# Patient Record
Sex: FEMALE | Race: ASIAN | NOT HISPANIC OR LATINO
[De-identification: names, ages, dates, MRNs, and addresses within clinical notes are randomized per-mention and may not be internally consistent; named-entity substitution may affect disease eponyms.]

---

## 2017-04-04 PROBLEM — Z00.00 ENCOUNTER FOR PREVENTIVE HEALTH EXAMINATION: Status: ACTIVE | Noted: 2017-04-04

## 2017-04-05 ENCOUNTER — APPOINTMENT (OUTPATIENT)
Dept: SURGICAL ONCOLOGY | Facility: CLINIC | Age: 62
End: 2017-04-05

## 2017-04-05 VITALS
OXYGEN SATURATION: 97 % | BODY MASS INDEX: 19.32 KG/M2 | WEIGHT: 105 LBS | HEIGHT: 62 IN | RESPIRATION RATE: 14 BRPM | HEART RATE: 74 BPM | SYSTOLIC BLOOD PRESSURE: 127 MMHG | DIASTOLIC BLOOD PRESSURE: 75 MMHG

## 2017-04-05 DIAGNOSIS — Z84.89 FAMILY HISTORY OF OTHER SPECIFIED CONDITIONS: ICD-10-CM

## 2017-04-05 DIAGNOSIS — Z78.9 OTHER SPECIFIED HEALTH STATUS: ICD-10-CM

## 2017-04-05 DIAGNOSIS — I10 ESSENTIAL (PRIMARY) HYPERTENSION: ICD-10-CM

## 2017-04-05 DIAGNOSIS — Z83.3 FAMILY HISTORY OF DIABETES MELLITUS: ICD-10-CM

## 2017-04-10 ENCOUNTER — RESULT REVIEW (OUTPATIENT)
Age: 62
End: 2017-04-10

## 2017-04-11 ENCOUNTER — RESULT REVIEW (OUTPATIENT)
Age: 62
End: 2017-04-11

## 2017-04-11 ENCOUNTER — OUTPATIENT (OUTPATIENT)
Dept: OUTPATIENT SERVICES | Facility: HOSPITAL | Age: 62
LOS: 1 days | Discharge: HOME | End: 2017-04-11

## 2017-04-11 ENCOUNTER — APPOINTMENT (OUTPATIENT)
Dept: HEMATOLOGY ONCOLOGY | Facility: CLINIC | Age: 62
End: 2017-04-11

## 2017-04-11 ENCOUNTER — APPOINTMENT (OUTPATIENT)
Dept: RADIATION ONCOLOGY | Facility: CLINIC | Age: 62
End: 2017-04-11

## 2017-04-11 ENCOUNTER — OUTPATIENT (OUTPATIENT)
Dept: OUTPATIENT SERVICES | Facility: HOSPITAL | Age: 62
LOS: 1 days | Discharge: HOME | End: 2017-04-11
Payer: MEDICAID

## 2017-04-11 VITALS
BODY MASS INDEX: 19.31 KG/M2 | WEIGHT: 103.62 LBS | OXYGEN SATURATION: 96 % | TEMPERATURE: 98 F | HEIGHT: 61.42 IN | HEART RATE: 71 BPM | SYSTOLIC BLOOD PRESSURE: 129 MMHG | DIASTOLIC BLOOD PRESSURE: 76 MMHG | RESPIRATION RATE: 16 BRPM

## 2017-04-11 VITALS
BODY MASS INDEX: 18.78 KG/M2 | DIASTOLIC BLOOD PRESSURE: 70 MMHG | TEMPERATURE: 96.9 F | WEIGHT: 102.06 LBS | OXYGEN SATURATION: 97 % | HEART RATE: 70 BPM | RESPIRATION RATE: 16 BRPM | HEIGHT: 62 IN | SYSTOLIC BLOOD PRESSURE: 109 MMHG

## 2017-04-11 DIAGNOSIS — Z78.9 OTHER SPECIFIED HEALTH STATUS: ICD-10-CM

## 2017-04-11 DIAGNOSIS — C20 MALIGNANT NEOPLASM OF RECTUM: ICD-10-CM

## 2017-04-11 LAB
BASOPHILS # BLD AUTO: 0.1 K/UL — SIGNIFICANT CHANGE UP (ref 0–0.2)
BASOPHILS NFR BLD AUTO: 0.9 % — SIGNIFICANT CHANGE UP (ref 0–2)
EOSINOPHIL # BLD AUTO: 0.1 K/UL — SIGNIFICANT CHANGE UP (ref 0–0.5)
EOSINOPHIL NFR BLD AUTO: 0.9 % — SIGNIFICANT CHANGE UP (ref 0–6)
HCT VFR BLD CALC: 38.3 % — SIGNIFICANT CHANGE UP (ref 34.5–45)
HGB BLD-MCNC: 13.2 G/DL — SIGNIFICANT CHANGE UP (ref 11.5–15.5)
LYMPHOCYTES # BLD AUTO: 1.8 K/UL — SIGNIFICANT CHANGE UP (ref 1–3.3)
LYMPHOCYTES # BLD AUTO: 31.2 % — SIGNIFICANT CHANGE UP (ref 13–44)
MCHC RBC-ENTMCNC: 32 PG — SIGNIFICANT CHANGE UP (ref 27–34)
MCHC RBC-ENTMCNC: 34.3 G/DL — SIGNIFICANT CHANGE UP (ref 32–36)
MCV RBC AUTO: 93.2 FL — SIGNIFICANT CHANGE UP (ref 80–100)
MONOCYTES # BLD AUTO: 0.4 K/UL — SIGNIFICANT CHANGE UP (ref 0–0.9)
MONOCYTES NFR BLD AUTO: 6.2 % — SIGNIFICANT CHANGE UP (ref 2–14)
NEUTROPHILS # BLD AUTO: 3.6 K/UL — SIGNIFICANT CHANGE UP (ref 1.8–7.4)
NEUTROPHILS NFR BLD AUTO: 60.8 % — SIGNIFICANT CHANGE UP (ref 43–77)
PLATELET # BLD AUTO: 210 K/UL — SIGNIFICANT CHANGE UP (ref 150–400)
RBC # BLD: 4.11 M/UL — SIGNIFICANT CHANGE UP (ref 3.8–5.2)
RBC # FLD: 11.7 % — SIGNIFICANT CHANGE UP (ref 10.3–14.5)
WBC # BLD: 5.9 K/UL — SIGNIFICANT CHANGE UP (ref 3.8–10.5)
WBC # FLD AUTO: 5.9 K/UL — SIGNIFICANT CHANGE UP (ref 3.8–10.5)

## 2017-04-11 PROCEDURE — 93010 ELECTROCARDIOGRAM REPORT: CPT

## 2017-04-12 ENCOUNTER — OUTPATIENT (OUTPATIENT)
Dept: OUTPATIENT SERVICES | Facility: HOSPITAL | Age: 62
LOS: 1 days | End: 2017-04-12

## 2017-04-12 ENCOUNTER — OUTPATIENT (OUTPATIENT)
Dept: OUTPATIENT SERVICES | Facility: HOSPITAL | Age: 62
LOS: 1 days | End: 2017-04-12
Payer: SELF-PAY

## 2017-04-12 DIAGNOSIS — K63.5 POLYP OF COLON: ICD-10-CM

## 2017-04-12 DIAGNOSIS — Z00.8 ENCOUNTER FOR OTHER GENERAL EXAMINATION: ICD-10-CM

## 2017-04-12 LAB
SURGICAL PATHOLOGY STUDY: SIGNIFICANT CHANGE UP
SURGICAL PATHOLOGY STUDY: SIGNIFICANT CHANGE UP

## 2017-04-12 PROCEDURE — 88321 CONSLTJ&REPRT SLD PREP ELSWR: CPT

## 2017-04-13 LAB
25(OH)D3 SERPL-MCNC: 23.1 NG/ML
ALBUMIN SERPL ELPH-MCNC: 4.7 G/DL
ALP BLD-CCNC: 39 U/L
ALT SERPL-CCNC: 16 U/L
ANION GAP SERPL CALC-SCNC: 22 MMOL/L
AST SERPL-CCNC: 24 U/L
BILIRUB SERPL-MCNC: 0.7 MG/DL
BUN SERPL-MCNC: 17 MG/DL
CALCIUM SERPL-MCNC: 9.6 MG/DL
CEA SERPL-MCNC: 3.8 NG/ML
CHLORIDE SERPL-SCNC: 94 MMOL/L
CO2 SERPL-SCNC: 26 MMOL/L
CREAT SERPL-MCNC: 0.69 MG/DL
GLUCOSE SERPL-MCNC: 99 MG/DL
HAV IGM SER QL: NONREACTIVE
HBV CORE IGM SER QL: NONREACTIVE
HBV SURFACE AG SER QL: NONREACTIVE
HCV AB SER QL: NONREACTIVE
HCV S/CO RATIO: 0.14 S/CO
POTASSIUM SERPL-SCNC: 3.4 MMOL/L
PROT SERPL-MCNC: 7.6 G/DL
SODIUM SERPL-SCNC: 142 MMOL/L

## 2017-04-20 ENCOUNTER — APPOINTMENT (OUTPATIENT)
Dept: INFUSION THERAPY | Facility: HOSPITAL | Age: 62
End: 2017-04-20

## 2017-04-23 ENCOUNTER — FORM ENCOUNTER (OUTPATIENT)
Age: 62
End: 2017-04-23

## 2017-04-24 ENCOUNTER — APPOINTMENT (OUTPATIENT)
Dept: NUCLEAR MEDICINE | Facility: IMAGING CENTER | Age: 62
End: 2017-04-24

## 2017-04-24 ENCOUNTER — OUTPATIENT (OUTPATIENT)
Dept: OUTPATIENT SERVICES | Facility: HOSPITAL | Age: 62
LOS: 1 days | End: 2017-04-24
Payer: MEDICAID

## 2017-04-24 DIAGNOSIS — C20 MALIGNANT NEOPLASM OF RECTUM: ICD-10-CM

## 2017-04-24 PROCEDURE — A9552: CPT

## 2017-04-24 PROCEDURE — 78815 PET IMAGE W/CT SKULL-THIGH: CPT

## 2017-04-27 ENCOUNTER — OTHER (OUTPATIENT)
Age: 62
End: 2017-04-27

## 2017-05-17 VITALS
WEIGHT: 103.16 LBS | RESPIRATION RATE: 16 BRPM | BODY MASS INDEX: 18.98 KG/M2 | TEMPERATURE: 97.5 F | DIASTOLIC BLOOD PRESSURE: 76 MMHG | SYSTOLIC BLOOD PRESSURE: 120 MMHG | OXYGEN SATURATION: 98 % | HEIGHT: 62 IN | HEART RATE: 67 BPM

## 2017-05-18 ENCOUNTER — OUTPATIENT (OUTPATIENT)
Dept: OUTPATIENT SERVICES | Facility: HOSPITAL | Age: 62
LOS: 1 days | Discharge: HOME | End: 2017-05-18

## 2017-05-18 ENCOUNTER — OTHER (OUTPATIENT)
Age: 62
End: 2017-05-18

## 2017-05-18 DIAGNOSIS — C20 MALIGNANT NEOPLASM OF RECTUM: ICD-10-CM

## 2017-05-19 ENCOUNTER — RESULT REVIEW (OUTPATIENT)
Age: 62
End: 2017-05-19

## 2017-05-19 ENCOUNTER — APPOINTMENT (OUTPATIENT)
Dept: HEMATOLOGY ONCOLOGY | Facility: CLINIC | Age: 62
End: 2017-05-19

## 2017-05-19 VITALS
HEART RATE: 72 BPM | BODY MASS INDEX: 19.03 KG/M2 | RESPIRATION RATE: 16 BRPM | TEMPERATURE: 98.3 F | DIASTOLIC BLOOD PRESSURE: 71 MMHG | WEIGHT: 104.06 LBS | SYSTOLIC BLOOD PRESSURE: 117 MMHG | OXYGEN SATURATION: 98 %

## 2017-05-19 LAB
BASOPHILS # BLD AUTO: 0 K/UL — SIGNIFICANT CHANGE UP (ref 0–0.2)
BASOPHILS NFR BLD AUTO: 0 % — SIGNIFICANT CHANGE UP (ref 0–2)
EOSINOPHIL # BLD AUTO: 0 K/UL — SIGNIFICANT CHANGE UP (ref 0–0.5)
EOSINOPHIL NFR BLD AUTO: 0.9 % — SIGNIFICANT CHANGE UP (ref 0–6)
HCT VFR BLD CALC: 36.8 % — SIGNIFICANT CHANGE UP (ref 34.5–45)
HGB BLD-MCNC: 12.6 G/DL — SIGNIFICANT CHANGE UP (ref 11.5–15.5)
LYMPHOCYTES # BLD AUTO: 0.8 K/UL — LOW (ref 1–3.3)
LYMPHOCYTES # BLD AUTO: 21 % — SIGNIFICANT CHANGE UP (ref 13–44)
MCHC RBC-ENTMCNC: 32.6 PG — SIGNIFICANT CHANGE UP (ref 27–34)
MCHC RBC-ENTMCNC: 34.2 G/DL — SIGNIFICANT CHANGE UP (ref 32–36)
MCV RBC AUTO: 95.5 FL — SIGNIFICANT CHANGE UP (ref 80–100)
MONOCYTES # BLD AUTO: 0.2 K/UL — SIGNIFICANT CHANGE UP (ref 0–0.9)
MONOCYTES NFR BLD AUTO: 6 % — SIGNIFICANT CHANGE UP (ref 2–14)
NEUTROPHILS # BLD AUTO: 2.8 K/UL — SIGNIFICANT CHANGE UP (ref 1.8–7.4)
NEUTROPHILS NFR BLD AUTO: 72.1 % — SIGNIFICANT CHANGE UP (ref 43–77)
PLATELET # BLD AUTO: 212 K/UL — SIGNIFICANT CHANGE UP (ref 150–400)
RBC # BLD: 3.85 M/UL — SIGNIFICANT CHANGE UP (ref 3.8–5.2)
RBC # FLD: 11.9 % — SIGNIFICANT CHANGE UP (ref 10.3–14.5)
WBC # BLD: 3.9 K/UL — SIGNIFICANT CHANGE UP (ref 3.8–10.5)
WBC # FLD AUTO: 3.9 K/UL — SIGNIFICANT CHANGE UP (ref 3.8–10.5)

## 2017-05-19 RX ORDER — CHLORTHALIDONE 25 MG/1
25 TABLET ORAL DAILY
Refills: 0 | Status: DISCONTINUED | COMMUNITY
Start: 2017-04-25 | End: 2017-05-19

## 2017-05-22 VITALS
DIASTOLIC BLOOD PRESSURE: 66 MMHG | RESPIRATION RATE: 16 BRPM | SYSTOLIC BLOOD PRESSURE: 110 MMHG | HEART RATE: 70 BPM | TEMPERATURE: 97.2 F

## 2017-05-22 LAB
ALBUMIN SERPL ELPH-MCNC: 4.2 G/DL
ALP BLD-CCNC: 33 U/L
ALT SERPL-CCNC: 30 U/L
ANION GAP SERPL CALC-SCNC: 12 MMOL/L
AST SERPL-CCNC: 26 U/L
BILIRUB SERPL-MCNC: 0.5 MG/DL
BUN SERPL-MCNC: 18 MG/DL
CALCIUM SERPL-MCNC: 9.5 MG/DL
CEA SERPL-MCNC: 3.5 NG/ML
CHLORIDE SERPL-SCNC: 99 MMOL/L
CO2 SERPL-SCNC: 27 MMOL/L
CREAT SERPL-MCNC: 0.74 MG/DL
GLUCOSE SERPL-MCNC: 95 MG/DL
POTASSIUM SERPL-SCNC: 4.7 MMOL/L
PROT SERPL-MCNC: 6.8 G/DL
SODIUM SERPL-SCNC: 138 MMOL/L

## 2017-05-25 ENCOUNTER — OTHER (OUTPATIENT)
Age: 62
End: 2017-05-25

## 2017-05-30 VITALS
DIASTOLIC BLOOD PRESSURE: 74 MMHG | SYSTOLIC BLOOD PRESSURE: 123 MMHG | HEART RATE: 65 BPM | RESPIRATION RATE: 18 BRPM | OXYGEN SATURATION: 99 % | WEIGHT: 105.27 LBS | BODY MASS INDEX: 19.25 KG/M2

## 2017-06-05 VITALS
SYSTOLIC BLOOD PRESSURE: 129 MMHG | WEIGHT: 104.28 LBS | HEART RATE: 62 BPM | BODY MASS INDEX: 19.07 KG/M2 | DIASTOLIC BLOOD PRESSURE: 76 MMHG | OXYGEN SATURATION: 100 % | RESPIRATION RATE: 16 BRPM

## 2017-06-06 ENCOUNTER — OTHER (OUTPATIENT)
Age: 62
End: 2017-06-06

## 2017-06-07 ENCOUNTER — RESULT REVIEW (OUTPATIENT)
Age: 62
End: 2017-06-07

## 2017-06-07 ENCOUNTER — APPOINTMENT (OUTPATIENT)
Dept: HEMATOLOGY ONCOLOGY | Facility: CLINIC | Age: 62
End: 2017-06-07

## 2017-06-07 ENCOUNTER — OTHER (OUTPATIENT)
Age: 62
End: 2017-06-07

## 2017-06-07 VITALS
SYSTOLIC BLOOD PRESSURE: 120 MMHG | BODY MASS INDEX: 18.95 KG/M2 | TEMPERATURE: 98 F | HEART RATE: 66 BPM | RESPIRATION RATE: 16 BRPM | DIASTOLIC BLOOD PRESSURE: 74 MMHG | WEIGHT: 103.62 LBS

## 2017-06-07 LAB
ALBUMIN SERPL ELPH-MCNC: 4.2 G/DL
ALP BLD-CCNC: 32 U/L
ALT SERPL-CCNC: 59 U/L
ANION GAP SERPL CALC-SCNC: 19 MMOL/L
AST SERPL-CCNC: 37 U/L
BASOPHILS # BLD AUTO: 0 K/UL — SIGNIFICANT CHANGE UP (ref 0–0.2)
BILIRUB SERPL-MCNC: 0.4 MG/DL
BUN SERPL-MCNC: 19 MG/DL
CALCIUM SERPL-MCNC: 9.6 MG/DL
CHLORIDE SERPL-SCNC: 99 MMOL/L
CO2 SERPL-SCNC: 24 MMOL/L
CREAT SERPL-MCNC: 0.63 MG/DL
EOSINOPHIL # BLD AUTO: 0.1 K/UL — SIGNIFICANT CHANGE UP (ref 0–0.5)
EOSINOPHIL NFR BLD AUTO: 3 % — SIGNIFICANT CHANGE UP (ref 0–6)
GLUCOSE SERPL-MCNC: 99 MG/DL
HCT VFR BLD CALC: 36.2 % — SIGNIFICANT CHANGE UP (ref 34.5–45)
HGB BLD-MCNC: 12.5 G/DL — SIGNIFICANT CHANGE UP (ref 11.5–15.5)
LYMPHOCYTES # BLD AUTO: 0.4 K/UL — LOW (ref 1–3.3)
LYMPHOCYTES # BLD AUTO: 14 % — SIGNIFICANT CHANGE UP (ref 13–44)
MCHC RBC-ENTMCNC: 33.4 PG — SIGNIFICANT CHANGE UP (ref 27–34)
MCHC RBC-ENTMCNC: 34.5 G/DL — SIGNIFICANT CHANGE UP (ref 32–36)
MCV RBC AUTO: 96.7 FL — SIGNIFICANT CHANGE UP (ref 80–100)
MONOCYTES # BLD AUTO: 0.4 K/UL — SIGNIFICANT CHANGE UP (ref 0–0.9)
MONOCYTES NFR BLD AUTO: 11 % — SIGNIFICANT CHANGE UP (ref 2–14)
NEUTROPHILS # BLD AUTO: 2 K/UL — SIGNIFICANT CHANGE UP (ref 1.8–7.4)
NEUTROPHILS NFR BLD AUTO: 72 % — SIGNIFICANT CHANGE UP (ref 43–77)
PLAT MORPH BLD: NORMAL — SIGNIFICANT CHANGE UP
PLATELET # BLD AUTO: 146 K/UL — LOW (ref 150–400)
POTASSIUM SERPL-SCNC: 4.6 MMOL/L
PROT SERPL-MCNC: 6.8 G/DL
RBC # BLD: 3.74 M/UL — LOW (ref 3.8–5.2)
RBC # FLD: 13.6 % — SIGNIFICANT CHANGE UP (ref 10.3–14.5)
RBC BLD AUTO: SIGNIFICANT CHANGE UP
SODIUM SERPL-SCNC: 142 MMOL/L
WBC # BLD: 2.9 K/UL — LOW (ref 3.8–10.5)
WBC # FLD AUTO: 2.9 K/UL — LOW (ref 3.8–10.5)

## 2017-06-08 LAB — 25(OH)D3 SERPL-MCNC: 21.8 NG/ML

## 2017-06-14 ENCOUNTER — OTHER (OUTPATIENT)
Age: 62
End: 2017-06-14

## 2017-06-14 VITALS
RESPIRATION RATE: 16 BRPM | WEIGHT: 104.72 LBS | SYSTOLIC BLOOD PRESSURE: 124 MMHG | DIASTOLIC BLOOD PRESSURE: 73 MMHG | HEART RATE: 60 BPM | TEMPERATURE: 36.5 F | BODY MASS INDEX: 19.15 KG/M2

## 2017-06-15 ENCOUNTER — OUTPATIENT (OUTPATIENT)
Dept: OUTPATIENT SERVICES | Facility: HOSPITAL | Age: 62
LOS: 1 days | Discharge: HOME | End: 2017-06-15

## 2017-06-15 DIAGNOSIS — C20 MALIGNANT NEOPLASM OF RECTUM: ICD-10-CM

## 2017-06-19 VITALS
SYSTOLIC BLOOD PRESSURE: 112 MMHG | OXYGEN SATURATION: 96 % | DIASTOLIC BLOOD PRESSURE: 69 MMHG | BODY MASS INDEX: 19.21 KG/M2 | HEART RATE: 66 BPM | RESPIRATION RATE: 16 BRPM | WEIGHT: 105.05 LBS

## 2017-06-20 ENCOUNTER — APPOINTMENT (OUTPATIENT)
Dept: HEMATOLOGY ONCOLOGY | Facility: CLINIC | Age: 62
End: 2017-06-20

## 2017-06-20 ENCOUNTER — RESULT REVIEW (OUTPATIENT)
Age: 62
End: 2017-06-20

## 2017-06-20 VITALS
SYSTOLIC BLOOD PRESSURE: 133 MMHG | DIASTOLIC BLOOD PRESSURE: 66 MMHG | HEART RATE: 68 BPM | BODY MASS INDEX: 19.23 KG/M2 | TEMPERATURE: 98.2 F | OXYGEN SATURATION: 96 % | RESPIRATION RATE: 16 BRPM | WEIGHT: 105.16 LBS

## 2017-06-20 LAB
ALBUMIN SERPL ELPH-MCNC: 4.3 G/DL
ALP BLD-CCNC: 34 U/L
ALT SERPL-CCNC: 54 U/L
ANION GAP SERPL CALC-SCNC: 15 MMOL/L
AST SERPL-CCNC: 33 U/L
BASOPHILS # BLD AUTO: 0 K/UL — SIGNIFICANT CHANGE UP (ref 0–0.2)
BASOPHILS NFR BLD AUTO: 0.3 % — SIGNIFICANT CHANGE UP (ref 0–2)
BILIRUB SERPL-MCNC: 0.8 MG/DL
BUN SERPL-MCNC: 12 MG/DL
CALCIUM SERPL-MCNC: 9.6 MG/DL
CHLORIDE SERPL-SCNC: 100 MMOL/L
CO2 SERPL-SCNC: 29 MMOL/L
CREAT SERPL-MCNC: 0.66 MG/DL
EOSINOPHIL # BLD AUTO: 0.1 K/UL — SIGNIFICANT CHANGE UP (ref 0–0.5)
EOSINOPHIL NFR BLD AUTO: 3.7 % — SIGNIFICANT CHANGE UP (ref 0–6)
GLUCOSE SERPL-MCNC: 91 MG/DL
HCT VFR BLD CALC: 35.9 % — SIGNIFICANT CHANGE UP (ref 34.5–45)
HGB BLD-MCNC: 12.4 G/DL — SIGNIFICANT CHANGE UP (ref 11.5–15.5)
LYMPHOCYTES # BLD AUTO: 0.3 K/UL — LOW (ref 1–3.3)
LYMPHOCYTES # BLD AUTO: 8.2 % — LOW (ref 13–44)
MCHC RBC-ENTMCNC: 33.7 PG — SIGNIFICANT CHANGE UP (ref 27–34)
MCHC RBC-ENTMCNC: 34.4 G/DL — SIGNIFICANT CHANGE UP (ref 32–36)
MCV RBC AUTO: 97.7 FL — SIGNIFICANT CHANGE UP (ref 80–100)
MONOCYTES # BLD AUTO: 0.4 K/UL — SIGNIFICANT CHANGE UP (ref 0–0.9)
MONOCYTES NFR BLD AUTO: 12.1 % — SIGNIFICANT CHANGE UP (ref 2–14)
NEUTROPHILS # BLD AUTO: 2.6 K/UL — SIGNIFICANT CHANGE UP (ref 1.8–7.4)
NEUTROPHILS NFR BLD AUTO: 75.8 % — SIGNIFICANT CHANGE UP (ref 43–77)
PLATELET # BLD AUTO: 184 K/UL — SIGNIFICANT CHANGE UP (ref 150–400)
POTASSIUM SERPL-SCNC: 4 MMOL/L
PROT SERPL-MCNC: 6.8 G/DL
RBC # BLD: 3.67 M/UL — LOW (ref 3.8–5.2)
RBC # FLD: 14.3 % — SIGNIFICANT CHANGE UP (ref 10.3–14.5)
SODIUM SERPL-SCNC: 144 MMOL/L
WBC # BLD: 3.5 K/UL — LOW (ref 3.8–10.5)
WBC # FLD AUTO: 3.5 K/UL — LOW (ref 3.8–10.5)

## 2017-07-03 ENCOUNTER — APPOINTMENT (OUTPATIENT)
Dept: SURGICAL ONCOLOGY | Facility: CLINIC | Age: 62
End: 2017-07-03

## 2017-07-03 VITALS
DIASTOLIC BLOOD PRESSURE: 70 MMHG | HEIGHT: 62 IN | BODY MASS INDEX: 19.14 KG/M2 | WEIGHT: 104 LBS | RESPIRATION RATE: 15 BRPM | HEART RATE: 80 BPM | SYSTOLIC BLOOD PRESSURE: 118 MMHG

## 2017-07-10 ENCOUNTER — APPOINTMENT (OUTPATIENT)
Dept: HEMATOLOGY ONCOLOGY | Facility: CLINIC | Age: 62
End: 2017-07-10

## 2017-07-11 ENCOUNTER — APPOINTMENT (OUTPATIENT)
Dept: CT IMAGING | Facility: IMAGING CENTER | Age: 62
End: 2017-07-11

## 2017-07-11 ENCOUNTER — OUTPATIENT (OUTPATIENT)
Dept: OUTPATIENT SERVICES | Facility: HOSPITAL | Age: 62
LOS: 1 days | End: 2017-07-11
Payer: MEDICAID

## 2017-07-11 DIAGNOSIS — C20 MALIGNANT NEOPLASM OF RECTUM: ICD-10-CM

## 2017-07-11 DIAGNOSIS — Z00.8 ENCOUNTER FOR OTHER GENERAL EXAMINATION: ICD-10-CM

## 2017-07-11 PROCEDURE — 82565 ASSAY OF CREATININE: CPT

## 2017-07-11 PROCEDURE — 74177 CT ABD & PELVIS W/CONTRAST: CPT

## 2017-07-20 ENCOUNTER — OUTPATIENT (OUTPATIENT)
Dept: OUTPATIENT SERVICES | Facility: HOSPITAL | Age: 62
LOS: 1 days | End: 2017-07-20
Payer: MEDICAID

## 2017-07-20 VITALS
TEMPERATURE: 98 F | RESPIRATION RATE: 14 BRPM | HEIGHT: 61.5 IN | DIASTOLIC BLOOD PRESSURE: 78 MMHG | SYSTOLIC BLOOD PRESSURE: 126 MMHG | OXYGEN SATURATION: 97 % | WEIGHT: 100.09 LBS | HEART RATE: 73 BPM

## 2017-07-20 DIAGNOSIS — C20 MALIGNANT NEOPLASM OF RECTUM: ICD-10-CM

## 2017-07-20 LAB
BLD GP AB SCN SERPL QL: NEGATIVE — SIGNIFICANT CHANGE UP
BUN SERPL-MCNC: 17 MG/DL — SIGNIFICANT CHANGE UP (ref 7–23)
CALCIUM SERPL-MCNC: 9.8 MG/DL — SIGNIFICANT CHANGE UP (ref 8.4–10.5)
CHLORIDE SERPL-SCNC: 99 MMOL/L — SIGNIFICANT CHANGE UP (ref 98–107)
CO2 SERPL-SCNC: 30 MMOL/L — SIGNIFICANT CHANGE UP (ref 22–31)
CREAT SERPL-MCNC: 0.62 MG/DL — SIGNIFICANT CHANGE UP (ref 0.5–1.3)
GLUCOSE SERPL-MCNC: 71 MG/DL — SIGNIFICANT CHANGE UP (ref 70–99)
HBA1C BLD-MCNC: 5.8 % — HIGH (ref 4–5.6)
HCT VFR BLD CALC: 40.7 % — SIGNIFICANT CHANGE UP (ref 34.5–45)
HGB BLD-MCNC: 13.2 G/DL — SIGNIFICANT CHANGE UP (ref 11.5–15.5)
MCHC RBC-ENTMCNC: 32.2 PG — SIGNIFICANT CHANGE UP (ref 27–34)
MCHC RBC-ENTMCNC: 32.4 % — SIGNIFICANT CHANGE UP (ref 32–36)
MCV RBC AUTO: 99.3 FL — SIGNIFICANT CHANGE UP (ref 80–100)
NRBC # FLD: 0 — SIGNIFICANT CHANGE UP
PLATELET # BLD AUTO: 172 K/UL — SIGNIFICANT CHANGE UP (ref 150–400)
PMV BLD: 9.1 FL — SIGNIFICANT CHANGE UP (ref 7–13)
POTASSIUM SERPL-MCNC: 4.5 MMOL/L — SIGNIFICANT CHANGE UP (ref 3.5–5.3)
POTASSIUM SERPL-SCNC: 4.5 MMOL/L — SIGNIFICANT CHANGE UP (ref 3.5–5.3)
RBC # BLD: 4.1 M/UL — SIGNIFICANT CHANGE UP (ref 3.8–5.2)
RBC # FLD: 14.7 % — HIGH (ref 10.3–14.5)
RH IG SCN BLD-IMP: POSITIVE — SIGNIFICANT CHANGE UP
SODIUM SERPL-SCNC: 143 MMOL/L — SIGNIFICANT CHANGE UP (ref 135–145)
TSH SERPL-MCNC: 2.83 UIU/ML — SIGNIFICANT CHANGE UP (ref 0.27–4.2)
WBC # BLD: 2.54 K/UL — LOW (ref 3.8–10.5)
WBC # FLD AUTO: 2.54 K/UL — LOW (ref 3.8–10.5)

## 2017-07-20 PROCEDURE — 93010 ELECTROCARDIOGRAM REPORT: CPT

## 2017-07-20 RX ORDER — MELOXICAM 15 MG/1
1 TABLET ORAL
Qty: 0 | Refills: 0 | COMMUNITY

## 2017-07-20 RX ORDER — SODIUM CHLORIDE 9 MG/ML
1000 INJECTION, SOLUTION INTRAVENOUS
Qty: 0 | Refills: 0 | Status: DISCONTINUED | OUTPATIENT
Start: 2017-08-04 | End: 2017-08-04

## 2017-07-20 NOTE — H&P PST ADULT - LYMPHATIC
supraclavicular R/posterior cervical L/anterior cervical L/posterior cervical R/anterior cervical R/supraclavicular L

## 2017-07-20 NOTE — H&P PST ADULT - ENDOCRINE COMMENTS
h/o thyroid disorder many years ago, stated "not taking medication for many years checking blood every year "

## 2017-07-20 NOTE — H&P PST ADULT - NSANTHOSAYNRD_GEN_A_CORE
No. BRISA screening performed.  STOP BANG Legend: 0-2 = LOW Risk; 3-4 = INTERMEDIATE Risk; 5-8 = HIGH Risk

## 2017-07-20 NOTE — H&P PST ADULT - NEGATIVE BREAST SYMPTOMS
no breast tenderness R/no breast lump R/no breast lump L/no nipple discharge L/no breast tenderness L

## 2017-07-25 ENCOUNTER — APPOINTMENT (OUTPATIENT)
Dept: RADIATION ONCOLOGY | Facility: CLINIC | Age: 62
End: 2017-07-25

## 2017-07-25 VITALS
BODY MASS INDEX: 19.03 KG/M2 | RESPIRATION RATE: 15 BRPM | HEART RATE: 63 BPM | OXYGEN SATURATION: 93 % | WEIGHT: 104.06 LBS | DIASTOLIC BLOOD PRESSURE: 77 MMHG | SYSTOLIC BLOOD PRESSURE: 136 MMHG

## 2017-08-04 ENCOUNTER — INPATIENT (INPATIENT)
Facility: HOSPITAL | Age: 62
LOS: 6 days | Discharge: HOME HEALTH | End: 2017-08-11
Attending: SPECIALIST | Admitting: SPECIALIST
Payer: MEDICAID

## 2017-08-04 ENCOUNTER — TRANSCRIPTION ENCOUNTER (OUTPATIENT)
Age: 62
End: 2017-08-04

## 2017-08-04 ENCOUNTER — RESULT REVIEW (OUTPATIENT)
Age: 62
End: 2017-08-04

## 2017-08-04 ENCOUNTER — APPOINTMENT (OUTPATIENT)
Dept: SURGICAL ONCOLOGY | Facility: HOSPITAL | Age: 62
End: 2017-08-04
Payer: MEDICAID

## 2017-08-04 VITALS
DIASTOLIC BLOOD PRESSURE: 84 MMHG | RESPIRATION RATE: 16 BRPM | TEMPERATURE: 98 F | HEART RATE: 68 BPM | WEIGHT: 100.09 LBS | OXYGEN SATURATION: 98 % | HEIGHT: 61.5 IN | SYSTOLIC BLOOD PRESSURE: 137 MMHG

## 2017-08-04 DIAGNOSIS — C20 MALIGNANT NEOPLASM OF RECTUM: ICD-10-CM

## 2017-08-04 LAB — RH IG SCN BLD-IMP: POSITIVE — SIGNIFICANT CHANGE UP

## 2017-08-04 PROCEDURE — 50715 RELEASE OF URETER: CPT | Mod: 59

## 2017-08-04 PROCEDURE — 88341 IMHCHEM/IMCYTCHM EA ADD ANTB: CPT | Mod: 26

## 2017-08-04 PROCEDURE — 48146 PANCREATECTOMY: CPT

## 2017-08-04 PROCEDURE — 88342 IMHCHEM/IMCYTCHM 1ST ANTB: CPT | Mod: 26

## 2017-08-04 PROCEDURE — 88305 TISSUE EXAM BY PATHOLOGIST: CPT | Mod: 26

## 2017-08-04 PROCEDURE — 88309 TISSUE EXAM BY PATHOLOGIST: CPT | Mod: 26

## 2017-08-04 PROCEDURE — 38747 REMOVE ABDOMINAL LYMPH NODES: CPT | Mod: 59

## 2017-08-04 RX ORDER — SODIUM CHLORIDE 9 MG/ML
1000 INJECTION, SOLUTION INTRAVENOUS
Qty: 0 | Refills: 0 | Status: DISCONTINUED | OUTPATIENT
Start: 2017-08-04 | End: 2017-08-05

## 2017-08-04 RX ORDER — CEFOTETAN DISODIUM 1 G
2 VIAL (EA) INJECTION EVERY 12 HOURS
Qty: 0 | Refills: 0 | Status: DISCONTINUED | OUTPATIENT
Start: 2017-08-04 | End: 2017-08-04

## 2017-08-04 RX ORDER — ONDANSETRON 8 MG/1
4 TABLET, FILM COATED ORAL ONCE
Qty: 0 | Refills: 0 | Status: DISCONTINUED | OUTPATIENT
Start: 2017-08-04 | End: 2017-08-04

## 2017-08-04 RX ORDER — ENOXAPARIN SODIUM 100 MG/ML
30 INJECTION SUBCUTANEOUS DAILY
Qty: 0 | Refills: 0 | Status: DISCONTINUED | OUTPATIENT
Start: 2017-08-05 | End: 2017-08-11

## 2017-08-04 RX ORDER — CEFOTETAN DISODIUM 1 G
2 VIAL (EA) INJECTION EVERY 12 HOURS
Qty: 0 | Refills: 0 | Status: COMPLETED | OUTPATIENT
Start: 2017-08-04 | End: 2017-08-05

## 2017-08-04 RX ORDER — FENTANYL CITRATE 50 UG/ML
25 INJECTION INTRAVENOUS
Qty: 0 | Refills: 0 | Status: DISCONTINUED | OUTPATIENT
Start: 2017-08-04 | End: 2017-08-04

## 2017-08-04 RX ORDER — HYDROMORPHONE HYDROCHLORIDE 2 MG/ML
30 INJECTION INTRAMUSCULAR; INTRAVENOUS; SUBCUTANEOUS
Qty: 0 | Refills: 0 | Status: DISCONTINUED | OUTPATIENT
Start: 2017-08-04 | End: 2017-08-10

## 2017-08-04 RX ORDER — HYDROMORPHONE HYDROCHLORIDE 2 MG/ML
0.25 INJECTION INTRAMUSCULAR; INTRAVENOUS; SUBCUTANEOUS ONCE
Qty: 0 | Refills: 0 | Status: DISCONTINUED | OUTPATIENT
Start: 2017-08-04 | End: 2017-08-04

## 2017-08-04 RX ORDER — ONDANSETRON 8 MG/1
4 TABLET, FILM COATED ORAL EVERY 6 HOURS
Qty: 0 | Refills: 0 | Status: DISCONTINUED | OUTPATIENT
Start: 2017-08-04 | End: 2017-08-10

## 2017-08-04 RX ORDER — NALOXONE HYDROCHLORIDE 4 MG/.1ML
0.1 SPRAY NASAL
Qty: 0 | Refills: 0 | Status: DISCONTINUED | OUTPATIENT
Start: 2017-08-04 | End: 2017-08-10

## 2017-08-04 RX ORDER — HEPARIN SODIUM 5000 [USP'U]/ML
5000 INJECTION INTRAVENOUS; SUBCUTANEOUS
Qty: 0 | Refills: 0 | Status: DISCONTINUED | OUTPATIENT
Start: 2017-08-04 | End: 2017-08-04

## 2017-08-04 RX ORDER — HYDROMORPHONE HYDROCHLORIDE 2 MG/ML
0.25 INJECTION INTRAMUSCULAR; INTRAVENOUS; SUBCUTANEOUS
Qty: 0 | Refills: 0 | Status: DISCONTINUED | OUTPATIENT
Start: 2017-08-04 | End: 2017-08-10

## 2017-08-04 RX ORDER — HEPARIN SODIUM 5000 [USP'U]/ML
5000 INJECTION INTRAVENOUS; SUBCUTANEOUS
Qty: 0 | Refills: 0 | Status: COMPLETED | OUTPATIENT
Start: 2017-08-04 | End: 2017-08-04

## 2017-08-04 RX ADMIN — SODIUM CHLORIDE 100 MILLILITER(S): 9 INJECTION, SOLUTION INTRAVENOUS at 21:32

## 2017-08-04 RX ADMIN — HEPARIN SODIUM 5000 UNIT(S): 5000 INJECTION INTRAVENOUS; SUBCUTANEOUS at 22:17

## 2017-08-04 RX ADMIN — SODIUM CHLORIDE 100 MILLILITER(S): 9 INJECTION, SOLUTION INTRAVENOUS at 13:00

## 2017-08-04 RX ADMIN — Medication 100 GRAM(S): at 22:14

## 2017-08-04 RX ADMIN — FENTANYL CITRATE 25 MICROGRAM(S): 50 INJECTION INTRAVENOUS at 12:45

## 2017-08-04 RX ADMIN — HYDROMORPHONE HYDROCHLORIDE 30 MILLILITER(S): 2 INJECTION INTRAMUSCULAR; INTRAVENOUS; SUBCUTANEOUS at 20:10

## 2017-08-04 RX ADMIN — HYDROMORPHONE HYDROCHLORIDE 0.25 MILLIGRAM(S): 2 INJECTION INTRAMUSCULAR; INTRAVENOUS; SUBCUTANEOUS at 12:45

## 2017-08-04 RX ADMIN — HYDROMORPHONE HYDROCHLORIDE 30 MILLILITER(S): 2 INJECTION INTRAMUSCULAR; INTRAVENOUS; SUBCUTANEOUS at 13:22

## 2017-08-04 RX ADMIN — HYDROMORPHONE HYDROCHLORIDE 0.25 MILLIGRAM(S): 2 INJECTION INTRAMUSCULAR; INTRAVENOUS; SUBCUTANEOUS at 12:30

## 2017-08-04 RX ADMIN — FENTANYL CITRATE 25 MICROGRAM(S): 50 INJECTION INTRAVENOUS at 13:00

## 2017-08-04 NOTE — PROGRESS NOTE ADULT - SUBJECTIVE AND OBJECTIVE BOX
D Team Surgery     Subjective:   JOCE EMMANUEL is a 62y Female with history of rectal cancer who is POD0 from a LAR with a hand-sewn anastomosis and diverting loop ileostomy. In the post-operative period, the patient denied any nausea or SOB, although the RN staff noted the patient's O2 saturation in the low 90%'s while the patient was sleeping. This returned to normal on RA when the patient woke up. Pain is well controlled.     Objective:  Allergies:  - No Known Allergies    PMH:  - Malignant neoplasm of rectum    PSH:   - Low anterior resection  - Diverting Loop Ileostomy     Meds:   - heparin  Injectable 5000 Unit(s) SubCutaneous <User Schedule>  -cefoTEtan  IVPB 2 Gram(s) IV Intermittent every 12 hours    ICU Vital Signs Last 24 Hrs  - T(C): 36.9 (04 Aug 2017 17:00), Max: 36.9 (04 Aug 2017 06:27)  - HR: 73 (04 Aug 2017 18:00) (57 - 73)  - BP: 141/67 (04 Aug 2017 18:00) (131/71 - 182/93)  - ABP: 148/68 (04 Aug 2017 17:00) (132/62 - 183/92)  - RR: 14 (04 Aug 2017 18:00) (10 - 21)  - SpO2: 100% (04 Aug 2017 18:00) (97% - 111%)    I/O:   08-04-17 @ 07:01  -  08-04-17 @ 19:14  --------------------------------------------------------  IN: 650 mL / OUT: 845 mL / NET: -195 mL    Physical Exam:   - General: sleeping prior to exam, wakes up and is interactive  - Pulm: breathing comfortably   - Abd: soft, appropriately tender, LLQ drain serosanguinous     Assessment:   JOCE EMMANUEL is a 62y Female POD0 from LAR with hand-sewn anastomosis and loop ileostomy.     Plan:   - NPO  - Baker  - Monitor GI function  - Monitor O2 saturation   - 24 hours of antibiotics, cefotetan

## 2017-08-04 NOTE — BRIEF OPERATIVE NOTE - PROCEDURE
Low anterior resection  08/04/2017  Hand-sewn Baker's anastomosis, diverting loop ileostomy  Active  [id number]

## 2017-08-04 NOTE — BRIEF OPERATIVE NOTE - OPERATION/FINDINGS
Her anal sphincter was so tight that when the 28F EEA went past it, the staple line blew. Hand-sewn anastomosis created and also a diverting loop ileostomy to protect it.

## 2017-08-05 LAB
BUN SERPL-MCNC: 11 MG/DL — SIGNIFICANT CHANGE UP (ref 7–23)
CALCIUM SERPL-MCNC: 8.3 MG/DL — LOW (ref 8.4–10.5)
CHLORIDE SERPL-SCNC: 103 MMOL/L — SIGNIFICANT CHANGE UP (ref 98–107)
CO2 SERPL-SCNC: 27 MMOL/L — SIGNIFICANT CHANGE UP (ref 22–31)
CREAT SERPL-MCNC: 0.61 MG/DL — SIGNIFICANT CHANGE UP (ref 0.5–1.3)
GLUCOSE SERPL-MCNC: 112 MG/DL — HIGH (ref 70–99)
HCT VFR BLD CALC: 33.3 % — LOW (ref 34.5–45)
HGB BLD-MCNC: 11.2 G/DL — LOW (ref 11.5–15.5)
MAGNESIUM SERPL-MCNC: 1.5 MG/DL — LOW (ref 1.6–2.6)
MCHC RBC-ENTMCNC: 32.4 PG — SIGNIFICANT CHANGE UP (ref 27–34)
MCHC RBC-ENTMCNC: 33.6 % — SIGNIFICANT CHANGE UP (ref 32–36)
MCV RBC AUTO: 96.2 FL — SIGNIFICANT CHANGE UP (ref 80–100)
NRBC # FLD: 0 — SIGNIFICANT CHANGE UP
PHOSPHATE SERPL-MCNC: 3.6 MG/DL — SIGNIFICANT CHANGE UP (ref 2.5–4.5)
PLATELET # BLD AUTO: 158 K/UL — SIGNIFICANT CHANGE UP (ref 150–400)
PMV BLD: 9.4 FL — SIGNIFICANT CHANGE UP (ref 7–13)
POTASSIUM SERPL-MCNC: 3.9 MMOL/L — SIGNIFICANT CHANGE UP (ref 3.5–5.3)
POTASSIUM SERPL-SCNC: 3.9 MMOL/L — SIGNIFICANT CHANGE UP (ref 3.5–5.3)
RBC # BLD: 3.46 M/UL — LOW (ref 3.8–5.2)
RBC # FLD: 13.5 % — SIGNIFICANT CHANGE UP (ref 10.3–14.5)
SODIUM SERPL-SCNC: 140 MMOL/L — SIGNIFICANT CHANGE UP (ref 135–145)
WBC # BLD: 6.82 K/UL — SIGNIFICANT CHANGE UP (ref 3.8–10.5)
WBC # FLD AUTO: 6.82 K/UL — SIGNIFICANT CHANGE UP (ref 3.8–10.5)

## 2017-08-05 RX ORDER — MAGNESIUM SULFATE 500 MG/ML
2 VIAL (ML) INJECTION ONCE
Qty: 0 | Refills: 0 | Status: COMPLETED | OUTPATIENT
Start: 2017-08-05 | End: 2017-08-05

## 2017-08-05 RX ORDER — SODIUM CHLORIDE 9 MG/ML
1000 INJECTION, SOLUTION INTRAVENOUS
Qty: 0 | Refills: 0 | Status: DISCONTINUED | OUTPATIENT
Start: 2017-08-05 | End: 2017-08-10

## 2017-08-05 RX ORDER — SODIUM CHLORIDE 9 MG/ML
500 INJECTION INTRAMUSCULAR; INTRAVENOUS; SUBCUTANEOUS ONCE
Qty: 0 | Refills: 0 | Status: COMPLETED | OUTPATIENT
Start: 2017-08-05 | End: 2017-08-05

## 2017-08-05 RX ORDER — DEXTROSE MONOHYDRATE, SODIUM CHLORIDE, AND POTASSIUM CHLORIDE 50; .745; 4.5 G/1000ML; G/1000ML; G/1000ML
1000 INJECTION, SOLUTION INTRAVENOUS
Qty: 0 | Refills: 0 | Status: DISCONTINUED | OUTPATIENT
Start: 2017-08-05 | End: 2017-08-05

## 2017-08-05 RX ADMIN — HYDROMORPHONE HYDROCHLORIDE 30 MILLILITER(S): 2 INJECTION INTRAMUSCULAR; INTRAVENOUS; SUBCUTANEOUS at 07:32

## 2017-08-05 RX ADMIN — SODIUM CHLORIDE 1000 MILLILITER(S): 9 INJECTION INTRAMUSCULAR; INTRAVENOUS; SUBCUTANEOUS at 20:16

## 2017-08-05 RX ADMIN — Medication 100 GRAM(S): at 08:43

## 2017-08-05 RX ADMIN — ENOXAPARIN SODIUM 30 MILLIGRAM(S): 100 INJECTION SUBCUTANEOUS at 12:16

## 2017-08-05 RX ADMIN — SODIUM CHLORIDE 100 MILLILITER(S): 9 INJECTION, SOLUTION INTRAVENOUS at 20:16

## 2017-08-05 RX ADMIN — Medication 50 GRAM(S): at 12:15

## 2017-08-05 RX ADMIN — HYDROMORPHONE HYDROCHLORIDE 30 MILLILITER(S): 2 INJECTION INTRAMUSCULAR; INTRAVENOUS; SUBCUTANEOUS at 19:21

## 2017-08-05 NOTE — PHYSICAL THERAPY INITIAL EVALUATION ADULT - PERTINENT HX OF CURRENT PROBLEM, REHAB EVAL
62 y.o. female w/ h/o rectal cancer, now s/p LAR with a hand-sewn anastomosis and diverting loop ileostomy.

## 2017-08-05 NOTE — PROGRESS NOTE ADULT - SUBJECTIVE AND OBJECTIVE BOX
Day __1_ of Anesthesia Pain Management Service    SUBJECTIVE:  Pain is ok    Pain Scale Score	At rest: ___ 	With Activity: ___ 	[x ] Refer to charted pain scores    THERAPY:    [ ] IV PCA Morphine		[ ] 5 mg/mL	[ ] 1 mg/mL  [ x] IV PCA Hydromorphone	[ ] 5 mg/mL	[ x] 1 mg/mL  [ ] IV PCA Fentanyl		[ ] 50 micrograms/mL    Demand dose __0.2_ lockout _6__ (minutes) Continuous Rate _0__ Total: __2_  Daily      MEDICATIONS  (STANDING):  HYDROmorphone PCA (1 mG/mL) 30 milliLiter(s) PCA Continuous PCA Continuous  enoxaparin Injectable 30 milliGRAM(s) SubCutaneous daily  dextrose 5% + sodium chloride 0.45% 1000 milliLiter(s) (100 mL/Hr) IV Continuous <Continuous>  magnesium sulfate  IVPB 2 Gram(s) IV Intermittent once    MEDICATIONS  (PRN):  HYDROmorphone PCA (1 mG/mL) Rescue Clinician Bolus 0.25 milliGRAM(s) IV Push every 15 minutes PRN for Pain Scale GREATER THAN 6  naloxone Injectable 0.1 milliGRAM(s) IV Push every 3 minutes PRN For ANY of the following changes in patient status:  A. RR LESS THAN 10 breaths per minute, B. Oxygen saturation LESS THAN 90%, C. Sedation score of 6  ondansetron Injectable 4 milliGRAM(s) IV Push every 6 hours PRN Nausea      OBJECTIVE:    Sedation Score:	[x ] Alert	[ ] Drowsy 	[ ] Arousable	[ ] Asleep	[ ] Unresponsive    Side Effects:	[x ] None	[ ] Nausea	[ ] Vomiting	[ ] Pruritus  		[ ] Other:    Vital Signs Last 24 Hrs  T(C): 37.1 (05 Aug 2017 08:30), Max: 37.1 (05 Aug 2017 08:30)  T(F): 98.8 (05 Aug 2017 08:30), Max: 98.8 (05 Aug 2017 08:30)  HR: 60 (05 Aug 2017 08:30) (57 - 73)  BP: 124/52 (05 Aug 2017 08:30) (124/52 - 182/93)  BP(mean): --  RR: 16 (05 Aug 2017 08:30) (10 - 21)  SpO2: 100% (05 Aug 2017 08:30) (97% - 111%)    ASSESSMENT/ PLAN    Therapy to  be:	[ x] Continue   [ ] Discontinued   [ ] Change to prn Analgesics    Documentation and Verification of current medications:   [X] Done	[ ] Not done, not elligible    Comments: continue IVPCA

## 2017-08-05 NOTE — PROGRESS NOTE ADULT - SUBJECTIVE AND OBJECTIVE BOX
D Team Surgery     Subjective:   JOCE EMMANUEL is a 62y Female with history of rectal cancer POD 1 from laparotomy, low anterior resection with hand-sewn anastomosis, and loop ileostomy. Pain well controlled. No nausea or vomiting. No flatus or BM. Left-sided SARA drain with serosanguinous output.     Objective:  Allergies:  - No Known Allergies    PMH:  - Malignant neoplasm of rectum    PSH:   - Low anterior resection  - Laparotomy  - Loop Ileostomy     Meds:   - HYDROmorphone PCA (1 mG/mL) 30 milliLiter(s) PCA Continuous PCA Continuous  - HYDROmorphone PCA (1 mG/mL) Rescue Clinician Bolus 0.25 milliGRAM(s) IV Push every 15 minutes PRN  - enoxaparin Injectable 30 milliGRAM(s) SubCutaneous daily    I&O's Detail:  04 Aug 2017 07:01  -  05 Aug 2017 07:00  --------------------------------------------------------  IN:    IV PiggyBack: 100 mL    lactated ringers.: 1750 mL  Total IN: 1850 mL  OUT:    Drain: 240 mL    Indwelling Catheter - Urethral: 980 mL  Total OUT: 1220 mL  Total NET: 630 mL  05 Aug 2017 07:01  -  05 Aug 2017 14:10  --------------------------------------------------------  IN:  Total IN: 0 mL  OUT:    Drain: 25 mL    Indwelling Catheter - Urethral: 275 mL  Total OUT: 300 mL  Total NET: -300 mL          I/O:     08-04-17 @ 07:01 - 08-05-17 @ 07:00  --------------------------------------------------------  IN: 1850 mL / OUT: 1220 mL / NET: 630 mL    08-05-17 @ 07:01 - 08-05-17 @ 14:07  --------------------------------------------------------  IN: 0 mL / OUT: 300 mL / NET: -300 mL        Physical Exam:   - General:   - HEENT:  - Neck:  - Chest:  - CV:  - Pulm:  - Abd:  - Msk:   - Skin:  - Neuro:  - Lines:     Labs:         Rads:     Assessment:   JOCE EMMANUEL is a 62y Female with history of   Plan: D Team Surgery     Subjective:   JOCE EMMANUEL is a 62y Female with history of rectal cancer POD 1 from laparotomy, low anterior resection with hand-sewn anastomosis, and loop ileostomy. Pain well controlled. No nausea or vomiting. No flatus or BM. Left-sided SARA drain with serosanguinous output.     Objective:  Allergies:  - No Known Allergies    PMH:  - Malignant neoplasm of rectum    PSH:   - Low anterior resection  - Laparotomy  - Loop Ileostomy     Meds:   - HYDROmorphone PCA (1 mG/mL) 30 milliLiter(s) PCA Continuous PCA Continuous  - HYDROmorphone PCA (1 mG/mL) Rescue Clinician Bolus 0.25 milliGRAM(s) IV Push every 15 minutes PRN  - enoxaparin Injectable 30 milliGRAM(s) SubCutaneous daily    ICU Vital Signs Last 24 Hrs:  - T(C): 37.2 (05 Aug 2017 12:09), Max: 37.2 (05 Aug 2017 12:09)  - HR: 67 (05 Aug 2017 12:09) (59 - 73)  - BP: 120/58 (05 Aug 2017 12:09) (120/58 - 158/69)  - RR: 18 (05 Aug 2017 12:09) (10 - 21)  - SpO2: 700% (05 Aug 2017 12:09) (97% - 700%)    I&O's Detail:  04 Aug 2017 07:01  -  05 Aug 2017 07:00  --------------------------------------------------------  IN:    IV PiggyBack: 100 mL    lactated ringers.: 1750 mL  Total IN: 1850 mL  OUT:    Drain: 240 mL    Indwelling Catheter - Urethral: 980 mL  Total OUT: 1220 mL  Total NET: 630 mL  05 Aug 2017 07:01  -  05 Aug 2017 14:10  --------------------------------------------------------  IN:  Total IN: 0 mL  OUT:    Drain: 25 mL    Indwelling Catheter - Urethral: 275 mL  Total OUT: 300 mL  Total NET: -300 mL    Physical Exam:   - General: alert, interactive, NAD  - Pulm: breathing comfortably   - Abd: RLQ ostomy, LLQ SARA with serosanguinous output, soft, NTD     Labs:                         11.2   6.82  )-----------( 158      ( 05 Aug 2017 05:27 )             33.3     140  |  103  |  11  ----------------------------<  112<H>  3.9   |  27  |  0.61    Ca    8.3<L>      05 Aug 2017 05:27  Phos  3.6     08-05  Mg     1.5     08-05    Assessment:   JOCE EMMANUEL is a 62y Female  POD 1 from laparotomy, low anterior resection with hand-sewn anastomosis, and loop ileostomy. Pain well controlled. Still no GI function. Will keep barakat in while PCA is in place.     Plan:   - Await GI function  - PCA for pain control  - Monitor drain output  - Ambulate

## 2017-08-05 NOTE — PROGRESS NOTE ADULT - SUBJECTIVE AND OBJECTIVE BOX
ANESTHESIA POSTOP CHECK    62y Female POSTOP DAY 1 S/P exploratory laparotomy    Vital Signs Last 24 Hrs  T(C): 37.1 (05 Aug 2017 08:30), Max: 37.1 (05 Aug 2017 08:30)  T(F): 98.8 (05 Aug 2017 08:30), Max: 98.8 (05 Aug 2017 08:30)  HR: 60 (05 Aug 2017 08:30) (57 - 73)  BP: 124/52 (05 Aug 2017 08:30) (124/52 - 182/93)  BP(mean): --  RR: 16 (05 Aug 2017 08:30) (10 - 21)  SpO2: 100% (05 Aug 2017 08:30) (97% - 111%)  I&O's Summary    04 Aug 2017 07:01  -  05 Aug 2017 07:00  --------------------------------------------------------  IN: 1850 mL / OUT: 1220 mL / NET: 630 mL    05 Aug 2017 07:01  -  05 Aug 2017 11:30  --------------------------------------------------------  IN: 0 mL / OUT: 170 mL / NET: -170 mL        [x ] NO APPARENT ANESTHESIA COMPLICATIONS      Comments: on IVPCA

## 2017-08-06 LAB
BUN SERPL-MCNC: 8 MG/DL — SIGNIFICANT CHANGE UP (ref 7–23)
CALCIUM SERPL-MCNC: 8.1 MG/DL — LOW (ref 8.4–10.5)
CHLORIDE SERPL-SCNC: 105 MMOL/L — SIGNIFICANT CHANGE UP (ref 98–107)
CO2 SERPL-SCNC: 30 MMOL/L — SIGNIFICANT CHANGE UP (ref 22–31)
CREAT SERPL-MCNC: 0.52 MG/DL — SIGNIFICANT CHANGE UP (ref 0.5–1.3)
GLUCOSE SERPL-MCNC: 119 MG/DL — HIGH (ref 70–99)
HCT VFR BLD CALC: 30 % — LOW (ref 34.5–45)
HGB BLD-MCNC: 10.1 G/DL — LOW (ref 11.5–15.5)
MAGNESIUM SERPL-MCNC: 1.9 MG/DL — SIGNIFICANT CHANGE UP (ref 1.6–2.6)
MCHC RBC-ENTMCNC: 32.9 PG — SIGNIFICANT CHANGE UP (ref 27–34)
MCHC RBC-ENTMCNC: 33.7 % — SIGNIFICANT CHANGE UP (ref 32–36)
MCV RBC AUTO: 97.7 FL — SIGNIFICANT CHANGE UP (ref 80–100)
NRBC # FLD: 0 — SIGNIFICANT CHANGE UP
PHOSPHATE SERPL-MCNC: 1.9 MG/DL — LOW (ref 2.5–4.5)
PLATELET # BLD AUTO: 139 K/UL — LOW (ref 150–400)
PMV BLD: 9.6 FL — SIGNIFICANT CHANGE UP (ref 7–13)
POTASSIUM SERPL-MCNC: 3.9 MMOL/L — SIGNIFICANT CHANGE UP (ref 3.5–5.3)
POTASSIUM SERPL-SCNC: 3.9 MMOL/L — SIGNIFICANT CHANGE UP (ref 3.5–5.3)
RBC # BLD: 3.07 M/UL — LOW (ref 3.8–5.2)
RBC # FLD: 13.7 % — SIGNIFICANT CHANGE UP (ref 10.3–14.5)
SODIUM SERPL-SCNC: 142 MMOL/L — SIGNIFICANT CHANGE UP (ref 135–145)
WBC # BLD: 4.96 K/UL — SIGNIFICANT CHANGE UP (ref 3.8–10.5)
WBC # FLD AUTO: 4.96 K/UL — SIGNIFICANT CHANGE UP (ref 3.8–10.5)

## 2017-08-06 RX ORDER — MAGNESIUM SULFATE 500 MG/ML
2 VIAL (ML) INJECTION ONCE
Qty: 0 | Refills: 0 | Status: COMPLETED | OUTPATIENT
Start: 2017-08-06 | End: 2017-08-06

## 2017-08-06 RX ORDER — POTASSIUM PHOSPHATE, MONOBASIC POTASSIUM PHOSPHATE, DIBASIC 236; 224 MG/ML; MG/ML
15 INJECTION, SOLUTION INTRAVENOUS ONCE
Qty: 0 | Refills: 0 | Status: COMPLETED | OUTPATIENT
Start: 2017-08-06 | End: 2017-08-06

## 2017-08-06 RX ADMIN — POTASSIUM PHOSPHATE, MONOBASIC POTASSIUM PHOSPHATE, DIBASIC 62.5 MILLIMOLE(S): 236; 224 INJECTION, SOLUTION INTRAVENOUS at 11:54

## 2017-08-06 RX ADMIN — Medication 50 GRAM(S): at 10:42

## 2017-08-06 RX ADMIN — SODIUM CHLORIDE 100 MILLILITER(S): 9 INJECTION, SOLUTION INTRAVENOUS at 07:56

## 2017-08-06 RX ADMIN — HYDROMORPHONE HYDROCHLORIDE 30 MILLILITER(S): 2 INJECTION INTRAMUSCULAR; INTRAVENOUS; SUBCUTANEOUS at 19:30

## 2017-08-06 RX ADMIN — ENOXAPARIN SODIUM 30 MILLIGRAM(S): 100 INJECTION SUBCUTANEOUS at 12:00

## 2017-08-06 RX ADMIN — HYDROMORPHONE HYDROCHLORIDE 30 MILLILITER(S): 2 INJECTION INTRAMUSCULAR; INTRAVENOUS; SUBCUTANEOUS at 07:55

## 2017-08-06 NOTE — PROGRESS NOTE ADULT - SUBJECTIVE AND OBJECTIVE BOX
D TEAM SURGERY DAILY PROGRESS NOTE:       Subjective: Patient examined at bedside. No issues overnight. Pain well controlled. Voiding and ambulating without issue. No ostomy function noted by patient. Denied n/v, fever, chills or uncontrolled pain.       Objective:  Gen: NAD, AAOx3  Abd: soft, appropriately tender near ostomy. RLQ ostomy with no gas or output noted. LLQ SARA in place suctioing  serosanguinous output,      MEDICATIONS  (STANDING):  HYDROmorphone PCA (1 mG/mL) 30 milliLiter(s) PCA Continuous PCA Continuous  enoxaparin Injectable 30 milliGRAM(s) SubCutaneous daily  dextrose 5% + sodium chloride 0.45% 1000 milliLiter(s) (100 mL/Hr) IV Continuous <Continuous>    MEDICATIONS  (PRN):  HYDROmorphone PCA (1 mG/mL) Rescue Clinician Bolus 0.25 milliGRAM(s) IV Push every 15 minutes PRN for Pain Scale GREATER THAN 6  naloxone Injectable 0.1 milliGRAM(s) IV Push every 3 minutes PRN For ANY of the following changes in patient status:  A. RR LESS THAN 10 breaths per minute, B. Oxygen saturation LESS THAN 90%, C. Sedation score of 6  ondansetron Injectable 4 milliGRAM(s) IV Push every 6 hours PRN Nausea      Vital Signs Last 24 Hrs  T(C): 36.5 (06 Aug 2017 12:00), Max: 37.2 (06 Aug 2017 05:20)  T(F): 97.7 (06 Aug 2017 12:00), Max: 98.9 (06 Aug 2017 05:20)  HR: 61 (06 Aug 2017 12:00) (53 - 66)  BP: 133/61 (06 Aug 2017 12:00) (110/60 - 133/61)  BP(mean): --  RR: 17 (06 Aug 2017 12:00) (16 - 18)  SpO2: 100% (06 Aug 2017 12:00) (99% - 100%)    I&O's Detail    05 Aug 2017 07:01  -  06 Aug 2017 07:00  --------------------------------------------------------  IN:    0.9% NaCl: 500 mL    dextrose 5% + sodium chloride 0.45%: 900 mL  Total IN: 1400 mL    OUT:    Drain: 193 mL    Indwelling Catheter - Urethral: 1375 mL  Total OUT: 1568 mL    Total NET: -168 mL      06 Aug 2017 07:01  -  06 Aug 2017 13:41  --------------------------------------------------------  IN:  Total IN: 0 mL    OUT:    Drain: 65 mL    Indwelling Catheter - Urethral: 575 mL  Total OUT: 640 mL    Total NET: -640 mL          Daily     Daily     LABS:                        10.1   4.96  )-----------( 139      ( 06 Aug 2017 05:30 )             30.0     08-06    142  |  105  |  8   ----------------------------<  119<H>  3.9   |  30  |  0.52    Ca    8.1<L>      06 Aug 2017 05:30  Phos  1.9     08-06  Mg     1.9     08-06            RADIOLOGY & ADDITIONAL STUDIES:

## 2017-08-06 NOTE — PROGRESS NOTE ADULT - SUBJECTIVE AND OBJECTIVE BOX
Day ___2 of Anesthesia Pain Management Service    SUBJECTIVE: i'm a little nauseated    Pain Scale Score	At rest: ___ 	With Activity: ___ 	[x ] Refer to charted pain scores    THERAPY:    [ ] IV PCA Morphine		[ ] 5 mg/mL	[ ] 1 mg/mL  [x ] IV PCA Hydromorphone	[ ] 5 mg/mL	[x ] 1 mg/mL  [ ] IV PCA Fentanyl		[ ] 50 micrograms/mL    Demand dose __0.2_ lockout __6_ (minutes) Continuous Rate _0__ Total: ___3  Daily      MEDICATIONS  (STANDING):  HYDROmorphone PCA (1 mG/mL) 30 milliLiter(s) PCA Continuous PCA Continuous  enoxaparin Injectable 30 milliGRAM(s) SubCutaneous daily  dextrose 5% + sodium chloride 0.45% 1000 milliLiter(s) (100 mL/Hr) IV Continuous <Continuous>  potassium phosphate IVPB 15 milliMole(s) IV Intermittent once    MEDICATIONS  (PRN):  HYDROmorphone PCA (1 mG/mL) Rescue Clinician Bolus 0.25 milliGRAM(s) IV Push every 15 minutes PRN for Pain Scale GREATER THAN 6  naloxone Injectable 0.1 milliGRAM(s) IV Push every 3 minutes PRN For ANY of the following changes in patient status:  A. RR LESS THAN 10 breaths per minute, B. Oxygen saturation LESS THAN 90%, C. Sedation score of 6  ondansetron Injectable 4 milliGRAM(s) IV Push every 6 hours PRN Nausea      OBJECTIVE: sitting in chair comfortably    Sedation Score:	[x ] Alert	[ ] Drowsy 	[ ] Arousable	[ ] Asleep	[ ] Unresponsive    Side Effects:	[ ] None	[x ] Nausea	[ ] Vomiting	[ ] Pruritus  		[ ] Other:    Vital Signs Last 24 Hrs  T(C): 37.2 (06 Aug 2017 08:20), Max: 37.2 (05 Aug 2017 12:09)  T(F): 98.9 (06 Aug 2017 08:20), Max: 98.9 (05 Aug 2017 12:09)  HR: 57 (06 Aug 2017 08:20) (53 - 67)  BP: 128/61 (06 Aug 2017 08:20) (110/60 - 128/61)  BP(mean): --  RR: 16 (06 Aug 2017 08:20) (16 - 18)  SpO2: 100% (06 Aug 2017 08:20) (99% - 700%)    ASSESSMENT/ PLAN    Therapy to  be:	[x ] Continue   [ ] Discontinued   [ ] Change to prn Analgesics    Documentation and Verification of current medications:   [X] Done	[ ] Not done, not elligible    Comments: Patient instructed to request for antiemetics for nausea.  Consider decreasing demand dose if nausea continues. Remains NPO

## 2017-08-06 NOTE — PROGRESS NOTE ADULT - ASSESSMENT
62y Female  POD 2 from laparotomy, low anterior resection with hand-sewn anastomosis, and loop ileostom    Plan:   - f/u ostomy function  - PCA for pain control  - Monitor drain output  - Ambulate  - f/u am labs, replete as necessary

## 2017-08-07 LAB
BUN SERPL-MCNC: 3 MG/DL — LOW (ref 7–23)
CALCIUM SERPL-MCNC: 8.4 MG/DL — SIGNIFICANT CHANGE UP (ref 8.4–10.5)
CHLORIDE SERPL-SCNC: 105 MMOL/L — SIGNIFICANT CHANGE UP (ref 98–107)
CO2 SERPL-SCNC: 28 MMOL/L — SIGNIFICANT CHANGE UP (ref 22–31)
CREAT SERPL-MCNC: 0.45 MG/DL — LOW (ref 0.5–1.3)
GLUCOSE SERPL-MCNC: 122 MG/DL — HIGH (ref 70–99)
HCT VFR BLD CALC: 30.7 % — LOW (ref 34.5–45)
HGB BLD-MCNC: 10.3 G/DL — LOW (ref 11.5–15.5)
MAGNESIUM SERPL-MCNC: 1.9 MG/DL — SIGNIFICANT CHANGE UP (ref 1.6–2.6)
MCHC RBC-ENTMCNC: 32.9 PG — SIGNIFICANT CHANGE UP (ref 27–34)
MCHC RBC-ENTMCNC: 33.6 % — SIGNIFICANT CHANGE UP (ref 32–36)
MCV RBC AUTO: 98.1 FL — SIGNIFICANT CHANGE UP (ref 80–100)
NRBC # FLD: 0 — SIGNIFICANT CHANGE UP
PHOSPHATE SERPL-MCNC: 2.2 MG/DL — LOW (ref 2.5–4.5)
PLATELET # BLD AUTO: 143 K/UL — LOW (ref 150–400)
PMV BLD: 9.4 FL — SIGNIFICANT CHANGE UP (ref 7–13)
POTASSIUM SERPL-MCNC: 3.9 MMOL/L — SIGNIFICANT CHANGE UP (ref 3.5–5.3)
POTASSIUM SERPL-SCNC: 3.9 MMOL/L — SIGNIFICANT CHANGE UP (ref 3.5–5.3)
RBC # BLD: 3.13 M/UL — LOW (ref 3.8–5.2)
RBC # FLD: 13.3 % — SIGNIFICANT CHANGE UP (ref 10.3–14.5)
SODIUM SERPL-SCNC: 140 MMOL/L — SIGNIFICANT CHANGE UP (ref 135–145)
WBC # BLD: 3.95 K/UL — SIGNIFICANT CHANGE UP (ref 3.8–10.5)
WBC # FLD AUTO: 3.95 K/UL — SIGNIFICANT CHANGE UP (ref 3.8–10.5)

## 2017-08-07 RX ORDER — POTASSIUM PHOSPHATE, MONOBASIC POTASSIUM PHOSPHATE, DIBASIC 236; 224 MG/ML; MG/ML
15 INJECTION, SOLUTION INTRAVENOUS ONCE
Qty: 0 | Refills: 0 | Status: DISCONTINUED | OUTPATIENT
Start: 2017-08-07 | End: 2017-08-07

## 2017-08-07 RX ORDER — POTASSIUM PHOSPHATE, MONOBASIC POTASSIUM PHOSPHATE, DIBASIC 236; 224 MG/ML; MG/ML
15 INJECTION, SOLUTION INTRAVENOUS ONCE
Qty: 0 | Refills: 0 | Status: COMPLETED | OUTPATIENT
Start: 2017-08-07 | End: 2017-08-07

## 2017-08-07 RX ORDER — MAGNESIUM SULFATE 500 MG/ML
1 VIAL (ML) INJECTION ONCE
Qty: 0 | Refills: 0 | Status: COMPLETED | OUTPATIENT
Start: 2017-08-07 | End: 2017-08-07

## 2017-08-07 RX ADMIN — Medication 100 GRAM(S): at 09:58

## 2017-08-07 RX ADMIN — HYDROMORPHONE HYDROCHLORIDE 30 MILLILITER(S): 2 INJECTION INTRAMUSCULAR; INTRAVENOUS; SUBCUTANEOUS at 07:21

## 2017-08-07 RX ADMIN — SODIUM CHLORIDE 100 MILLILITER(S): 9 INJECTION, SOLUTION INTRAVENOUS at 23:11

## 2017-08-07 RX ADMIN — ENOXAPARIN SODIUM 30 MILLIGRAM(S): 100 INJECTION SUBCUTANEOUS at 12:06

## 2017-08-07 RX ADMIN — POTASSIUM PHOSPHATE, MONOBASIC POTASSIUM PHOSPHATE, DIBASIC 62.5 MILLIMOLE(S): 236; 224 INJECTION, SOLUTION INTRAVENOUS at 09:58

## 2017-08-07 RX ADMIN — SODIUM CHLORIDE 100 MILLILITER(S): 9 INJECTION, SOLUTION INTRAVENOUS at 07:52

## 2017-08-07 NOTE — PROGRESS NOTE ADULT - SUBJECTIVE AND OBJECTIVE BOX
Day _4_ of Anesthesia Pain Management Service    Allergies    No Known Allergies    SUBJECTIVE: Per daughter at bedside, patient achieves adequate analgesia with use of IV PCA    Pain Scale Score	At rest: ___ 	With Activity: ___ 	[x] Refer to charted pain scores    THERAPY:    [ ] IV PCA Morphine		[ ] 5 mg/mL	[ ] 1 mg/mL  [X] IV PCA Hydromorphone	[ ] 5 mg/mL	[X] 1 mg/mL  [ ] IV PCA Fentanyl		[ ] 50 micrograms/mL    Demand dose _0.2mg_ lockout _6_ (minutes) Continuous Rate _0_ Total: _4mg_  Daily      MEDICATIONS  (STANDING):  HYDROmorphone PCA (1 mG/mL) 30 milliLiter(s) PCA Continuous PCA Continuous  enoxaparin Injectable 30 milliGRAM(s) SubCutaneous daily  dextrose 5% + sodium chloride 0.45% 1000 milliLiter(s) (100 mL/Hr) IV Continuous <Continuous>    MEDICATIONS  (PRN):  HYDROmorphone PCA (1 mG/mL) Rescue Clinician Bolus 0.25 milliGRAM(s) IV Push every 15 minutes PRN for Pain Scale GREATER THAN 6  naloxone Injectable 0.1 milliGRAM(s) IV Push every 3 minutes PRN For ANY of the following changes in patient status:  A. RR LESS THAN 10 breaths per minute, B. Oxygen saturation LESS THAN 90%, C. Sedation score of 6  ondansetron Injectable 4 milliGRAM(s) IV Push every 6 hours PRN Nausea      OBJECTIVE: Asleep, NAD, supine in bed    Sedation Score:	[ ] Alert	[ ] Drowsy	[x] Arousable	[x] Asleep	[ ] Unresponsive    Side Effects:	[X] None	[ ] Nausea	[ ] Vomiting	[ ] Pruritus  		  [ ] Weakness		[ ] Numbness	[ ] Other:                          10.3   3.95  )-----------( 143      ( 07 Aug 2017 05:34 )             30.7       08-07    140  |  105  |  3<L>  ----------------------------<  122<H>  3.9   |  28  |  0.45<L>    Ca    8.4      07 Aug 2017 05:34  Phos  2.2     08-07  Mg     1.9     08-07        ASSESSMENT/ PLAN    Therapy to  be:	[X] Continue   [ ] Discontinued   [ ] Change to prn Analgesics    Documentation and Verification of current medications:  [X] Done	[ ] Not done, not eligible  [ ] Not done, reason not given    Comments:  remains NPO

## 2017-08-07 NOTE — PROGRESS NOTE ADULT - SUBJECTIVE AND OBJECTIVE BOX
Vital Signs Last 24 Hrs  T(C): 37.2 (07 Aug 2017 05:26), Max: 37.2 (06 Aug 2017 08:20)  T(F): 99 (07 Aug 2017 05:26), Max: 99 (07 Aug 2017 05:26)  HR: 61 (07 Aug 2017 05:26) (57 - 64)  BP: 132/60 (07 Aug 2017 05:26) (126/57 - 135/63)  BP(mean): --  RR: 18 (07 Aug 2017 05:26) (16 - 18)  SpO2: 100% (07 Aug 2017 05:26) (98% - 100%)    I&O's Detail    06 Aug 2017 07:01  -  07 Aug 2017 07:00  --------------------------------------------------------  IN:    dextrose 5% + sodium chloride 0.45%: 1800 mL  Total IN: 1800 mL    OUT:    Drain: 155 mL    Indwelling Catheter - Urethral: 3175 mL  Total OUT: 3330 mL    Total NET: -1530 mL                                10.3   3.95  )-----------( 143      ( 07 Aug 2017 05:34 )             30.7       08-06    142  |  105  |  8   ----------------------------<  119<H>  3.9   |  30  |  0.52    Ca    8.1<L>      06 Aug 2017 05:30  Phos  1.9     08-06  Mg     1.9     08-06    No ostomy function yet  Incision clear  Pt. is burping            PLAN:    Continue NPO  Ambulate

## 2017-08-07 NOTE — PROGRESS NOTE ADULT - SUBJECTIVE AND OBJECTIVE BOX
D Team Surgery     Subjective:   JOCE EMMANUEL is a 62y Female with history of rectal cancer POD3 from laparotomy, low anterior resection with hand-sewn anastomosis, and loop ileostomy. Pain well controlled. Some nausea on 8/6, but now resolved. No vomiting, chills, or diaphoresis. No flatus or ostomy function.      Objective:  Allergies:  - No Known Allergies    PMH:  - Malignant neoplasm of rectum    PSH:   - Low anterior resection  - Laparotomy  - Loop Ileostomy     Meds:   - HYDROmorphone PCA (1 mG/mL) 30 milliLiter(s) PCA Continuous PCA Continuous  - HYDROmorphone PCA (1 mG/mL) Rescue Clinician Bolus 0.25 milliGRAM(s) IV Push every 15 minutes PRN  - enoxaparin Injectable 30 milliGRAM(s) SubCutaneous daily    ICU Vital Signs Last 24 Hrs:  - T(C): 36.8 (07 Aug 2017 07:50), Max: 37.2 (07 Aug 2017 05:26)  - HR: 60 (07 Aug 2017 07:50) (60 - 64)  - BP: 133/58 (07 Aug 2017 07:50) (126/57 - 135/63)  - RR: 18 (07 Aug 2017 07:50) (17 - 18)  - SpO2: 98% (07 Aug 2017 07:50) (98% - 100%)    I&O's Detail:  06 Aug 2017 07:01  -  07 Aug 2017 07:00  --------------------------------------------------------  IN:    dextrose 5% + sodium chloride 0.45%: 1800 mL  Total IN: 1800 mL    OUT:    Drain: 155 mL    Indwelling Catheter - Urethral: 3175 mL  Total OUT: 3330 mL  Total NET: -1530 mL  07 Aug 2017 07:01  -  07 Aug 2017 10:46  --------------------------------------------------------  IN:    dextrose 5% + sodium chloride 0.45%: 400 mL    IV PiggyBack: 100 mL  Total IN: 500 mL  OUT:    Drain: 15 mL    Indwelling Catheter - Urethral: 175 mL  Total OUT: 190 mL  Total NET: 310 mL    Physical Exam:   - General: alert, interactive, NAD  - Pulm: breathing comfortably   - Abd: RLQ ostomy without contents in bag, LLQ SARA with serosanguinous output, soft, NTD     Labs:                         10.3   3.95  )-----------( 143      ( 07 Aug 2017 05:34 )             30.7   08-07    140  |  105  |  3<L>  ----------------------------<  122<H>  3.9   |  28  |  0.45<L>    Ca    8.4      07 Aug 2017 05:34  Phos  2.2     08-07  Mg     1.9     08-07    Assessment:   JOCE EMMANUEL is a 62y Female  POD 3 from laparotomy, low anterior resection with hand-sewn anastomosis, and loop ileostomy. Pain well controlled. Still no GI function from ostomy. Will keep barakat in while PCA is in place through 8/9. Will reassess at that time. Prolonged barakat and NPO are due to the relatively distal nature of anastomosis.     Plan:   - Await GI function  - PCA for pain control  - Monitor drain output  - Ambulate  - Keep barakat through 8/9  - Keep NPO through 8/9

## 2017-08-07 NOTE — PROGRESS NOTE ADULT - SUBJECTIVE AND OBJECTIVE BOX
Anesthesia Pain Management Service- Attending Addendum    SUBJECTIVE: Pt doing well with IV PCA without problems reported.    Therapy:	  [ X] IV PCA	   [ ] Epidural           [ ] s/p Spinal Opoid              [ ] Postpartum infusion	  [ ] Patient controlled regional anesthesia (PCRA)    [ ] prn Analgesics    Allergies    No Known Allergies    Intolerances      MEDICATIONS  (STANDING):  HYDROmorphone PCA (1 mG/mL) 30 milliLiter(s) PCA Continuous PCA Continuous  enoxaparin Injectable 30 milliGRAM(s) SubCutaneous daily  dextrose 5% + sodium chloride 0.45% 1000 milliLiter(s) (100 mL/Hr) IV Continuous <Continuous>    MEDICATIONS  (PRN):  HYDROmorphone PCA (1 mG/mL) Rescue Clinician Bolus 0.25 milliGRAM(s) IV Push every 15 minutes PRN for Pain Scale GREATER THAN 6  naloxone Injectable 0.1 milliGRAM(s) IV Push every 3 minutes PRN For ANY of the following changes in patient status:  A. RR LESS THAN 10 breaths per minute, B. Oxygen saturation LESS THAN 90%, C. Sedation score of 6  ondansetron Injectable 4 milliGRAM(s) IV Push every 6 hours PRN Nausea      OBJECTIVE:   [X] No new signs     [ ] Other:    Side Effects:  [X ] None			[ ] Other:    Assessment of Catheter Site:		[ ] Intact		[ ] Other:    ASSESSMENT/PLAN  [ X] Continue current therapy    [ ] Therapy changed to:    [ ] IV PCA       [ ] Epidural     [ ] prn Analgesics     Comments:

## 2017-08-08 LAB
BUN SERPL-MCNC: 3 MG/DL — LOW (ref 7–23)
CALCIUM SERPL-MCNC: 8.5 MG/DL — SIGNIFICANT CHANGE UP (ref 8.4–10.5)
CHLORIDE SERPL-SCNC: 104 MMOL/L — SIGNIFICANT CHANGE UP (ref 98–107)
CO2 SERPL-SCNC: 29 MMOL/L — SIGNIFICANT CHANGE UP (ref 22–31)
CREAT SERPL-MCNC: 0.42 MG/DL — LOW (ref 0.5–1.3)
GLUCOSE SERPL-MCNC: 115 MG/DL — HIGH (ref 70–99)
HCT VFR BLD CALC: 32.7 % — LOW (ref 34.5–45)
HGB BLD-MCNC: 11 G/DL — LOW (ref 11.5–15.5)
MAGNESIUM SERPL-MCNC: 1.6 MG/DL — SIGNIFICANT CHANGE UP (ref 1.6–2.6)
MCHC RBC-ENTMCNC: 32.5 PG — SIGNIFICANT CHANGE UP (ref 27–34)
MCHC RBC-ENTMCNC: 33.6 % — SIGNIFICANT CHANGE UP (ref 32–36)
MCV RBC AUTO: 96.7 FL — SIGNIFICANT CHANGE UP (ref 80–100)
NRBC # FLD: 0 — SIGNIFICANT CHANGE UP
PHOSPHATE SERPL-MCNC: 2.7 MG/DL — SIGNIFICANT CHANGE UP (ref 2.5–4.5)
PLATELET # BLD AUTO: 172 K/UL — SIGNIFICANT CHANGE UP (ref 150–400)
PMV BLD: 9.3 FL — SIGNIFICANT CHANGE UP (ref 7–13)
POTASSIUM SERPL-MCNC: 3.8 MMOL/L — SIGNIFICANT CHANGE UP (ref 3.5–5.3)
POTASSIUM SERPL-SCNC: 3.8 MMOL/L — SIGNIFICANT CHANGE UP (ref 3.5–5.3)
RBC # BLD: 3.38 M/UL — LOW (ref 3.8–5.2)
RBC # FLD: 13 % — SIGNIFICANT CHANGE UP (ref 10.3–14.5)
SODIUM SERPL-SCNC: 140 MMOL/L — SIGNIFICANT CHANGE UP (ref 135–145)
WBC # BLD: 3.19 K/UL — LOW (ref 3.8–10.5)
WBC # FLD AUTO: 3.19 K/UL — LOW (ref 3.8–10.5)

## 2017-08-08 RX ORDER — MAGNESIUM SULFATE 500 MG/ML
2 VIAL (ML) INJECTION ONCE
Qty: 0 | Refills: 0 | Status: COMPLETED | OUTPATIENT
Start: 2017-08-08 | End: 2017-08-08

## 2017-08-08 RX ORDER — POTASSIUM PHOSPHATE, MONOBASIC POTASSIUM PHOSPHATE, DIBASIC 236; 224 MG/ML; MG/ML
15 INJECTION, SOLUTION INTRAVENOUS ONCE
Qty: 0 | Refills: 0 | Status: COMPLETED | OUTPATIENT
Start: 2017-08-08 | End: 2017-08-08

## 2017-08-08 RX ADMIN — Medication 50 GRAM(S): at 11:37

## 2017-08-08 RX ADMIN — HYDROMORPHONE HYDROCHLORIDE 30 MILLILITER(S): 2 INJECTION INTRAMUSCULAR; INTRAVENOUS; SUBCUTANEOUS at 07:29

## 2017-08-08 RX ADMIN — ENOXAPARIN SODIUM 30 MILLIGRAM(S): 100 INJECTION SUBCUTANEOUS at 11:38

## 2017-08-08 RX ADMIN — SODIUM CHLORIDE 100 MILLILITER(S): 9 INJECTION, SOLUTION INTRAVENOUS at 07:47

## 2017-08-08 RX ADMIN — HYDROMORPHONE HYDROCHLORIDE 30 MILLILITER(S): 2 INJECTION INTRAMUSCULAR; INTRAVENOUS; SUBCUTANEOUS at 19:24

## 2017-08-08 RX ADMIN — POTASSIUM PHOSPHATE, MONOBASIC POTASSIUM PHOSPHATE, DIBASIC 62.5 MILLIMOLE(S): 236; 224 INJECTION, SOLUTION INTRAVENOUS at 12:32

## 2017-08-08 NOTE — PROGRESS NOTE ADULT - SUBJECTIVE AND OBJECTIVE BOX
Anesthesia Pain Management Service    SUBJECTIVE: Patient is doing well with IV PCA and no significant problems reported.    Pain Scale Score	At rest: ___ 	With Activity: ___ 	[X ] Refer to charted pain scores    THERAPY:    [ ] IV PCA Morphine		[ ] 5 mg/mL	[ ] 1 mg/mL  [X ] IV PCA Hydromorphone	[ ] 5 mg/mL	[X ] 1 mg/mL  [ ] IV PCA Fentanyl		[ ] 50 micrograms/mL    Demand dose __0.2_ lockout __6_ (minutes) Continuous Rate _0__ Total: ___  Daily      MEDICATIONS  (STANDING):  HYDROmorphone PCA (1 mG/mL) 30 milliLiter(s) PCA Continuous PCA Continuous  enoxaparin Injectable 30 milliGRAM(s) SubCutaneous daily  dextrose 5% + sodium chloride 0.45% 1000 milliLiter(s) (100 mL/Hr) IV Continuous <Continuous>    MEDICATIONS  (PRN):  HYDROmorphone PCA (1 mG/mL) Rescue Clinician Bolus 0.25 milliGRAM(s) IV Push every 15 minutes PRN for Pain Scale GREATER THAN 6  naloxone Injectable 0.1 milliGRAM(s) IV Push every 3 minutes PRN For ANY of the following changes in patient status:  A. RR LESS THAN 10 breaths per minute, B. Oxygen saturation LESS THAN 90%, C. Sedation score of 6  ondansetron Injectable 4 milliGRAM(s) IV Push every 6 hours PRN Nausea      OBJECTIVE:    Sedation Score:	[ X] Alert	[ ] Drowsy 	[ ] Arousable	[ ] Asleep	[ ] Unresponsive    Side Effects:	[X ] None	[ ] Nausea	[ ] Vomiting	[ ] Pruritus  		[ ] Other:    Vital Signs Last 24 Hrs  T(C): 36.7 (08 Aug 2017 12:10), Max: 37.2 (07 Aug 2017 20:15)  T(F): 98.1 (08 Aug 2017 12:10), Max: 98.9 (07 Aug 2017 20:15)  HR: 61 (08 Aug 2017 12:10) (57 - 63)  BP: 112/63 (08 Aug 2017 12:10) (112/63 - 137/59)  BP(mean): --  RR: 18 (08 Aug 2017 12:10) (18 - 18)  SpO2: 98% (08 Aug 2017 12:10) (98% - 100%)    ASSESSMENT/ PLAN    Therapy to  be:	[ ] Continue   [ X] Discontinued   [X ] Change to prn Analgesics    Documentation and Verification of current medications:   [X] Done	[ ] Not done, not elligible    Comments: PRN Oral/IV opioids and/or Adjuvant medication to be ordered at this point.

## 2017-08-08 NOTE — PROGRESS NOTE ADULT - SUBJECTIVE AND OBJECTIVE BOX
Day __1_ of Anesthesia Pain Management Service    SUBJECTIVE:    Pain Scale Score	At rest: __0_ 	With Activity: ___ 	[ ] Refer to charted pain scores    THERAPY:    [ ] IV PCA Morphine		[ ] 5 mg/mL	[ ] 1 mg/mL  [x ] IV PCA Hydromorphone	[ ] 5 mg/mL	[ ] 1 mg/mL  [ ] IV PCA Fentanyl		[ ] 50 micrograms/mL    Demand dose _0.2__ lockout __6_ (minutes) Continuous Rate ___ Total: ___  Daily      MEDICATIONS  (STANDING):  HYDROmorphone PCA (1 mG/mL) 30 milliLiter(s) PCA Continuous PCA Continuous  enoxaparin Injectable 30 milliGRAM(s) SubCutaneous daily  dextrose 5% + sodium chloride 0.45% 1000 milliLiter(s) (100 mL/Hr) IV Continuous <Continuous>    MEDICATIONS  (PRN):  HYDROmorphone PCA (1 mG/mL) Rescue Clinician Bolus 0.25 milliGRAM(s) IV Push every 15 minutes PRN for Pain Scale GREATER THAN 6  naloxone Injectable 0.1 milliGRAM(s) IV Push every 3 minutes PRN For ANY of the following changes in patient status:  A. RR LESS THAN 10 breaths per minute, B. Oxygen saturation LESS THAN 90%, C. Sedation score of 6  ondansetron Injectable 4 milliGRAM(s) IV Push every 6 hours PRN Nausea      OBJECTIVE:    Sedation Score:	[x ] Alert	[ ] Drowsy 	[ ] Arousable	[ ] Asleep	[ ] Unresponsive    Side Effects:	[x ] None	[ ] Nausea	[ ] Vomiting	[ ] Pruritus  		[ ] Other:    Vital Signs Last 24 Hrs  T(C): 36.7 (08 Aug 2017 12:10), Max: 37.2 (07 Aug 2017 20:15)  T(F): 98.1 (08 Aug 2017 12:10), Max: 98.9 (07 Aug 2017 20:15)  HR: 61 (08 Aug 2017 12:10) (57 - 63)  BP: 112/63 (08 Aug 2017 12:10) (112/63 - 137/59)  BP(mean): --  RR: 18 (08 Aug 2017 12:10) (18 - 18)  SpO2: 98% (08 Aug 2017 12:10) (98% - 100%)    ASSESSMENT/ PLAN    Therapy to  be:	[x Continue   [ ] Discontinued   [ ] Change to prn Analgesics    Documentation and Verification of current medications:   [X] Done	[ ] Not done, not elligible    Comments:

## 2017-08-08 NOTE — PROGRESS NOTE ADULT - SUBJECTIVE AND OBJECTIVE BOX
Vital Signs Last 24 Hrs  T(C): 36.6 (08 Aug 2017 07:42), Max: 37.2 (07 Aug 2017 20:15)  T(F): 97.9 (08 Aug 2017 07:42), Max: 98.9 (07 Aug 2017 20:15)  HR: 59 (08 Aug 2017 07:42) (57 - 63)  BP: 136/65 (08 Aug 2017 07:42) (121/60 - 137/59)  BP(mean): --  RR: 18 (08 Aug 2017 07:42) (18 - 18)  SpO2: 98% (08 Aug 2017 07:42) (95% - 100%)    I&O's Detail    07 Aug 2017 07:01  -  08 Aug 2017 07:00  --------------------------------------------------------  IN:    dextrose 5% + sodium chloride 0.45%: 2300 mL    IV PiggyBack: 100 mL  Total IN: 2400 mL    OUT:    Drain: 195 mL    Ileostomy: 5 mL    Indwelling Catheter - Urethral: 3250 mL  Total OUT: 3450 mL    Total NET: -1050 mL      08 Aug 2017 07:01  -  08 Aug 2017 08:06  --------------------------------------------------------  IN:  Total IN: 0 mL    OUT:    Voided: 180 mL  Total OUT: 180 mL    Total NET: -180 mL                                11.0   3.19  )-----------( 172      ( 08 Aug 2017 05:07 )             32.7       08-08    140  |  104  |  3<L>  ----------------------------<  115<H>  3.8   |  29  |  0.42<L>    Ca    8.5      08 Aug 2017 05:07  Phos  2.7     08-08  Mg     1.6     08-08    ostomy functioning  incision clear            PLAN:  Clear liquid diet  Ambulate

## 2017-08-08 NOTE — PROGRESS NOTE ADULT - SUBJECTIVE AND OBJECTIVE BOX
Day _4_ of Anesthesia Pain Management Service    Allergies    No Known Allergies    SUBJECTIVE: "It helps when I use it. I do have nausea sometimes."    Pain Scale Score	At rest: _3-4/10_ 	With Activity: ___ 	[ ] Refer to charted pain scores    THERAPY:    [ ] IV PCA Morphine		[ ] 5 mg/mL	[ ] 1 mg/mL  [X] IV PCA Hydromorphone	[ ] 5 mg/mL	[X] 1 mg/mL  [ ] IV PCA Fentanyl		[ ] 50 micrograms/mL    Demand dose _0.2mg_ lockout _6_ (minutes) Continuous Rate _0_ Total: _1.8mg_  Daily      MEDICATIONS  (STANDING):  HYDROmorphone PCA (1 mG/mL) 30 milliLiter(s) PCA Continuous PCA Continuous  enoxaparin Injectable 30 milliGRAM(s) SubCutaneous daily  dextrose 5% + sodium chloride 0.45% 1000 milliLiter(s) (100 mL/Hr) IV Continuous <Continuous>    MEDICATIONS  (PRN):  HYDROmorphone PCA (1 mG/mL) Rescue Clinician Bolus 0.25 milliGRAM(s) IV Push every 15 minutes PRN for Pain Scale GREATER THAN 6  naloxone Injectable 0.1 milliGRAM(s) IV Push every 3 minutes PRN For ANY of the following changes in patient status:  A. RR LESS THAN 10 breaths per minute, B. Oxygen saturation LESS THAN 90%, C. Sedation score of 6  ondansetron Injectable 4 milliGRAM(s) IV Push every 6 hours PRN Nausea      OBJECTIVE: Asleep, NAD, supine in bed    Sedation Score:	[ ] Alert	[ ] Drowsy	[x] Arousable	[x] Asleep	[ ] Unresponsive    Side Effects:	[X] None	[ ] Nausea	[ ] Vomiting	[ ] Pruritus  		  [ ] Weakness		[ ] Numbness	[ ] Other:                          11.0   3.19  )-----------( 172      ( 08 Aug 2017 05:07 )             32.7       08-08    140  |  104  |  3<L>  ----------------------------<  115<H>  3.8   |  29  |  0.42<L>    Ca    8.5      08 Aug 2017 05:07  Phos  2.7     08-08  Mg     1.6     08-08        ASSESSMENT/ PLAN    Therapy to  be:	[X] Continue   [ ] Discontinued   [ ] Change to prn Analgesics    Documentation and Verification of current medications:  [X] Done	[ ] Not done, not eligible  [ ] Not done, reason not given    Comments:  Oral analgesics 08/09/17 if clear liquids tolerated overnight

## 2017-08-08 NOTE — PROGRESS NOTE ADULT - ASSESSMENT
62y Female  POD 4 from laparotomy, low anterior resection with hand-sewn anastomosis, and loop ileostomy    -Adv to CLD as having ostomy function  -Continued Ostomy teaching  -f/u AM labs, replete as needed   -DVT ppx: Lovenox  - C/W Baker until 8/9/17

## 2017-08-08 NOTE — PROGRESS NOTE ADULT - SUBJECTIVE AND OBJECTIVE BOX
D TEAM SURGERY DAILY PROGRESS NOTE:       Subjective: Patient examined at bedside. No issues overnight. Pain well controlled. Voiding without issue with Baker in place. Denied n/v, fever chills or uncontrolled pain.      Objective:  Gen: NAD, AAOx3  Abd: soft, appropriately tender, nondistended. Ostomy with  stool in bag. SARA in place suctioning serosanguinous fluid.       MEDICATIONS  (STANDING):  HYDROmorphone PCA (1 mG/mL) 30 milliLiter(s) PCA Continuous PCA Continuous  enoxaparin Injectable 30 milliGRAM(s) SubCutaneous daily  dextrose 5% + sodium chloride 0.45% 1000 milliLiter(s) (100 mL/Hr) IV Continuous <Continuous>  potassium phosphate IVPB 15 milliMole(s) IV Intermittent once  magnesium sulfate  IVPB 2 Gram(s) IV Intermittent once    MEDICATIONS  (PRN):  HYDROmorphone PCA (1 mG/mL) Rescue Clinician Bolus 0.25 milliGRAM(s) IV Push every 15 minutes PRN for Pain Scale GREATER THAN 6  naloxone Injectable 0.1 milliGRAM(s) IV Push every 3 minutes PRN For ANY of the following changes in patient status:  A. RR LESS THAN 10 breaths per minute, B. Oxygen saturation LESS THAN 90%, C. Sedation score of 6  ondansetron Injectable 4 milliGRAM(s) IV Push every 6 hours PRN Nausea      Vital Signs Last 24 Hrs  T(C): 36.6 (08 Aug 2017 07:42), Max: 37.2 (07 Aug 2017 20:15)  T(F): 97.9 (08 Aug 2017 07:42), Max: 98.9 (07 Aug 2017 20:15)  HR: 59 (08 Aug 2017 07:42) (57 - 63)  BP: 136/65 (08 Aug 2017 07:42) (121/60 - 137/59)  BP(mean): --  RR: 18 (08 Aug 2017 07:42) (18 - 18)  SpO2: 98% (08 Aug 2017 07:42) (95% - 100%)    I&O's Detail    07 Aug 2017 07:01  -  08 Aug 2017 07:00  --------------------------------------------------------  IN:    dextrose 5% + sodium chloride 0.45%: 2300 mL    IV PiggyBack: 100 mL  Total IN: 2400 mL    OUT:    Drain: 195 mL    Ileostomy: 5 mL    Indwelling Catheter - Urethral: 3250 mL  Total OUT: 3450 mL    Total NET: -1050 mL      08 Aug 2017 07:01  -  08 Aug 2017 10:41  --------------------------------------------------------  IN:  Total IN: 0 mL    OUT:    Indwelling Catheter - Urethral: 400 mL    Voided: 180 mL  Total OUT: 580 mL    Total NET: -580 mL          Daily     Daily     LABS:                        11.0   3.19  )-----------( 172      ( 08 Aug 2017 05:07 )             32.7     08-08    140  |  104  |  3<L>  ----------------------------<  115<H>  3.8   |  29  |  0.42<L>    Ca    8.5      08 Aug 2017 05:07  Phos  2.7     08-08  Mg     1.6     08-08            RADIOLOGY & ADDITIONAL STUDIES:

## 2017-08-09 LAB
BUN SERPL-MCNC: 4 MG/DL — LOW (ref 7–23)
CALCIUM SERPL-MCNC: 9 MG/DL — SIGNIFICANT CHANGE UP (ref 8.4–10.5)
CHLORIDE SERPL-SCNC: 101 MMOL/L — SIGNIFICANT CHANGE UP (ref 98–107)
CO2 SERPL-SCNC: 30 MMOL/L — SIGNIFICANT CHANGE UP (ref 22–31)
CREAT SERPL-MCNC: 0.43 MG/DL — LOW (ref 0.5–1.3)
GLUCOSE SERPL-MCNC: 115 MG/DL — HIGH (ref 70–99)
HCT VFR BLD CALC: 33.2 % — LOW (ref 34.5–45)
HGB BLD-MCNC: 11.4 G/DL — LOW (ref 11.5–15.5)
MAGNESIUM SERPL-MCNC: 2 MG/DL — SIGNIFICANT CHANGE UP (ref 1.6–2.6)
MCHC RBC-ENTMCNC: 32.9 PG — SIGNIFICANT CHANGE UP (ref 27–34)
MCHC RBC-ENTMCNC: 34.3 % — SIGNIFICANT CHANGE UP (ref 32–36)
MCV RBC AUTO: 96 FL — SIGNIFICANT CHANGE UP (ref 80–100)
NRBC # FLD: 0 — SIGNIFICANT CHANGE UP
PHOSPHATE SERPL-MCNC: 3.3 MG/DL — SIGNIFICANT CHANGE UP (ref 2.5–4.5)
PLATELET # BLD AUTO: 200 K/UL — SIGNIFICANT CHANGE UP (ref 150–400)
PMV BLD: 9.1 FL — SIGNIFICANT CHANGE UP (ref 7–13)
POTASSIUM SERPL-MCNC: 4 MMOL/L — SIGNIFICANT CHANGE UP (ref 3.5–5.3)
POTASSIUM SERPL-SCNC: 4 MMOL/L — SIGNIFICANT CHANGE UP (ref 3.5–5.3)
RBC # BLD: 3.46 M/UL — LOW (ref 3.8–5.2)
RBC # FLD: 12.9 % — SIGNIFICANT CHANGE UP (ref 10.3–14.5)
SODIUM SERPL-SCNC: 139 MMOL/L — SIGNIFICANT CHANGE UP (ref 135–145)
WBC # BLD: 3.05 K/UL — LOW (ref 3.8–10.5)
WBC # FLD AUTO: 3.05 K/UL — LOW (ref 3.8–10.5)

## 2017-08-09 PROCEDURE — 93010 ELECTROCARDIOGRAM REPORT: CPT

## 2017-08-09 RX ADMIN — ENOXAPARIN SODIUM 30 MILLIGRAM(S): 100 INJECTION SUBCUTANEOUS at 11:16

## 2017-08-09 RX ADMIN — SODIUM CHLORIDE 100 MILLILITER(S): 9 INJECTION, SOLUTION INTRAVENOUS at 07:20

## 2017-08-09 RX ADMIN — HYDROMORPHONE HYDROCHLORIDE 30 MILLILITER(S): 2 INJECTION INTRAMUSCULAR; INTRAVENOUS; SUBCUTANEOUS at 19:20

## 2017-08-09 RX ADMIN — HYDROMORPHONE HYDROCHLORIDE 30 MILLILITER(S): 2 INJECTION INTRAMUSCULAR; INTRAVENOUS; SUBCUTANEOUS at 07:12

## 2017-08-09 NOTE — PROGRESS NOTE ADULT - SUBJECTIVE AND OBJECTIVE BOX
Vital Signs Last 24 Hrs  T(C): 36.8 (09 Aug 2017 07:47), Max: 36.8 (08 Aug 2017 20:13)  T(F): 98.2 (09 Aug 2017 07:47), Max: 98.3 (08 Aug 2017 20:13)  HR: 65 (09 Aug 2017 07:47) (60 - 73)  BP: 121/62 (09 Aug 2017 07:47) (106/56 - 136/60)  BP(mean): --  RR: 18 (09 Aug 2017 07:47) (16 - 18)  SpO2: 97% (09 Aug 2017 07:47) (97% - 100%)    I&O's Detail    08 Aug 2017 07:01  -  09 Aug 2017 07:00  --------------------------------------------------------  IN:    dextrose 5% + sodium chloride 0.45%: 100 mL  Total IN: 100 mL    OUT:    Drain: 165 mL    Ileostomy: 220 mL    Indwelling Catheter - Urethral: 1650 mL  Total OUT: 2035 mL    Total NET: -1935 mL      09 Aug 2017 07:01  -  09 Aug 2017 13:59  --------------------------------------------------------  IN:    dextrose 5% + sodium chloride 0.45%: 600 mL    Oral Fluid: 100 mL  Total IN: 700 mL    OUT:    Drain: 60 mL    Ileostomy: 200 mL    Indwelling Catheter - Urethral: 350 mL    Voided: 300 mL  Total OUT: 910 mL    Total NET: -210 mL                                11.4   3.05  )-----------( 200      ( 09 Aug 2017 05:11 )             33.2       08-09    139  |  101  |  4<L>  ----------------------------<  115<H>  4.0   |  30  |  0.43<L>    Ca    9.0      09 Aug 2017 05:11  Phos  3.3     08-09  Mg     2.0     08-09      Tolerating liquids  Incision clear            PLAN:    Regular diet tomorrow  Ambulate  Ostomy teaching

## 2017-08-09 NOTE — PROGRESS NOTE ADULT - SUBJECTIVE AND OBJECTIVE BOX
Day _6_ of Anesthesia Pain Management Service    Allergies    No Known Allergies      SUBJECTIVE: " I still get nauseous from time to time."    Pain Scale Score	At rest: _3/10_ 	With Activity: ___ 	[ ] Refer to charted pain scores    THERAPY:    [ ] IV PCA Morphine		[ ] 5 mg/mL	[ ] 1 mg/mL  [X] IV PCA Hydromorphone	[ ] 5 mg/mL	[X] 1 mg/mL  [ ] IV PCA Fentanyl		[ ] 50 micrograms/mL    Demand dose _0.2mg_ lockout _6_ (minutes) Continuous Rate _0_ Total: _1.8mg_  Daily      MEDICATIONS  (STANDING):  HYDROmorphone PCA (1 mG/mL) 30 milliLiter(s) PCA Continuous PCA Continuous  enoxaparin Injectable 30 milliGRAM(s) SubCutaneous daily  dextrose 5% + sodium chloride 0.45% 1000 milliLiter(s) (100 mL/Hr) IV Continuous <Continuous>    MEDICATIONS  (PRN):  HYDROmorphone PCA (1 mG/mL) Rescue Clinician Bolus 0.25 milliGRAM(s) IV Push every 15 minutes PRN for Pain Scale GREATER THAN 6  naloxone Injectable 0.1 milliGRAM(s) IV Push every 3 minutes PRN For ANY of the following changes in patient status:  A. RR LESS THAN 10 breaths per minute, B. Oxygen saturation LESS THAN 90%, C. Sedation score of 6  ondansetron Injectable 4 milliGRAM(s) IV Push every 6 hours PRN Nausea      OBJECTIVE: A&Ox3, NAD, sitting up in chair    Sedation Score:	[X] Alert	[ ] Drowsy	[ ] Arousable	[ ] Asleep	[ ] Unresponsive    Side Effects:	[X] None	[ ] Nausea	[ ] Vomiting	[ ] Pruritus  		  [ ] Weakness		[ ] Numbness	[ ] Other:                          11.4   3.05  )-----------( 200      ( 09 Aug 2017 05:11 )             33.2       08-09    139  |  101  |  4<L>  ----------------------------<  115<H>  4.0   |  30  |  0.43<L>    Ca    9.0      09 Aug 2017 05:11  Phos  3.3     08-09  Mg     2.0     08-09        ASSESSMENT/ PLAN    Therapy to  be:	[X] Continue   [ ] Discontinued   [ ] Change to prn Analgesics    Documentation and Verification of current medications:  [X] Done	[ ] Not done, not eligible  [ ] Not done, reason not given    Comments:  Encouraged to request antiemetics  Oral analgesics once nausea improves

## 2017-08-09 NOTE — PROGRESS NOTE ADULT - SUBJECTIVE AND OBJECTIVE BOX
Anesthesia Pain Management Service    SUBJECTIVE: Pt doing well with IV PCA without problems reported.    Therapy:	  [ X] IV PCA	   [ ] Epidural           [ ] s/p Spinal Opoid              [ ] Postpartum infusion	  [ ] Patient controlled regional anesthesia (PCRA)    [ ] prn Analgesics    Allergies    No Known Allergies    Intolerances      MEDICATIONS  (STANDING):  HYDROmorphone PCA (1 mG/mL) 30 milliLiter(s) PCA Continuous PCA Continuous  enoxaparin Injectable 30 milliGRAM(s) SubCutaneous daily  dextrose 5% + sodium chloride 0.45% 1000 milliLiter(s) (100 mL/Hr) IV Continuous <Continuous>    MEDICATIONS  (PRN):  HYDROmorphone PCA (1 mG/mL) Rescue Clinician Bolus 0.25 milliGRAM(s) IV Push every 15 minutes PRN for Pain Scale GREATER THAN 6  naloxone Injectable 0.1 milliGRAM(s) IV Push every 3 minutes PRN For ANY of the following changes in patient status:  A. RR LESS THAN 10 breaths per minute, B. Oxygen saturation LESS THAN 90%, C. Sedation score of 6  ondansetron Injectable 4 milliGRAM(s) IV Push every 6 hours PRN Nausea      OBJECTIVE:   [X] No new signs     [ ] Other:    Side Effects:  [X ] None			[ ] Other:    Assessment of Catheter Site:		[ ] Intact		[ ] Other:    ASSESSMENT/PLAN  [ X] Continue current therapy    [ ] Therapy changed to:    [ ] IV PCA       [ ] Epidural     [ ] prn Analgesics     Comments:    Patient was seen 08-09-17 @ 17:07

## 2017-08-10 ENCOUNTER — TRANSCRIPTION ENCOUNTER (OUTPATIENT)
Age: 62
End: 2017-08-10

## 2017-08-10 LAB
BUN SERPL-MCNC: 4 MG/DL — LOW (ref 7–23)
CALCIUM SERPL-MCNC: 9 MG/DL — SIGNIFICANT CHANGE UP (ref 8.4–10.5)
CHLORIDE SERPL-SCNC: 102 MMOL/L — SIGNIFICANT CHANGE UP (ref 98–107)
CO2 SERPL-SCNC: 28 MMOL/L — SIGNIFICANT CHANGE UP (ref 22–31)
CREAT SERPL-MCNC: 0.37 MG/DL — LOW (ref 0.5–1.3)
GLUCOSE SERPL-MCNC: 125 MG/DL — HIGH (ref 70–99)
HCT VFR BLD CALC: 33.8 % — LOW (ref 34.5–45)
HGB BLD-MCNC: 11.4 G/DL — LOW (ref 11.5–15.5)
MAGNESIUM SERPL-MCNC: 1.8 MG/DL — SIGNIFICANT CHANGE UP (ref 1.6–2.6)
MCHC RBC-ENTMCNC: 32.1 PG — SIGNIFICANT CHANGE UP (ref 27–34)
MCHC RBC-ENTMCNC: 33.7 % — SIGNIFICANT CHANGE UP (ref 32–36)
MCV RBC AUTO: 95.2 FL — SIGNIFICANT CHANGE UP (ref 80–100)
NRBC # FLD: 0 — SIGNIFICANT CHANGE UP
PHOSPHATE SERPL-MCNC: 3.7 MG/DL — SIGNIFICANT CHANGE UP (ref 2.5–4.5)
PLATELET # BLD AUTO: 220 K/UL — SIGNIFICANT CHANGE UP (ref 150–400)
PMV BLD: 8.7 FL — SIGNIFICANT CHANGE UP (ref 7–13)
POTASSIUM SERPL-MCNC: 4 MMOL/L — SIGNIFICANT CHANGE UP (ref 3.5–5.3)
POTASSIUM SERPL-SCNC: 4 MMOL/L — SIGNIFICANT CHANGE UP (ref 3.5–5.3)
RBC # BLD: 3.55 M/UL — LOW (ref 3.8–5.2)
RBC # FLD: 12.9 % — SIGNIFICANT CHANGE UP (ref 10.3–14.5)
SODIUM SERPL-SCNC: 140 MMOL/L — SIGNIFICANT CHANGE UP (ref 135–145)
WBC # BLD: 2.98 K/UL — LOW (ref 3.8–10.5)
WBC # FLD AUTO: 2.98 K/UL — LOW (ref 3.8–10.5)

## 2017-08-10 RX ORDER — OXYCODONE HYDROCHLORIDE 5 MG/1
5 TABLET ORAL
Qty: 0 | Refills: 0 | Status: DISCONTINUED | OUTPATIENT
Start: 2017-08-10 | End: 2017-08-11

## 2017-08-10 RX ORDER — SODIUM CHLORIDE 9 MG/ML
3 INJECTION INTRAMUSCULAR; INTRAVENOUS; SUBCUTANEOUS EVERY 8 HOURS
Qty: 0 | Refills: 0 | Status: DISCONTINUED | OUTPATIENT
Start: 2017-08-10 | End: 2017-08-11

## 2017-08-10 RX ORDER — OXYCODONE HYDROCHLORIDE 5 MG/1
2.5 TABLET ORAL
Qty: 0 | Refills: 0 | Status: DISCONTINUED | OUTPATIENT
Start: 2017-08-10 | End: 2017-08-11

## 2017-08-10 RX ORDER — MAGNESIUM SULFATE 500 MG/ML
2 VIAL (ML) INJECTION ONCE
Qty: 0 | Refills: 0 | Status: COMPLETED | OUTPATIENT
Start: 2017-08-10 | End: 2017-08-10

## 2017-08-10 RX ORDER — ACETAMINOPHEN 500 MG
650 TABLET ORAL EVERY 6 HOURS
Qty: 0 | Refills: 0 | Status: DISCONTINUED | OUTPATIENT
Start: 2017-08-10 | End: 2017-08-11

## 2017-08-10 RX ADMIN — Medication 50 GRAM(S): at 11:24

## 2017-08-10 RX ADMIN — Medication 650 MILLIGRAM(S): at 20:38

## 2017-08-10 RX ADMIN — SODIUM CHLORIDE 3 MILLILITER(S): 9 INJECTION INTRAMUSCULAR; INTRAVENOUS; SUBCUTANEOUS at 11:24

## 2017-08-10 RX ADMIN — HYDROMORPHONE HYDROCHLORIDE 30 MILLILITER(S): 2 INJECTION INTRAMUSCULAR; INTRAVENOUS; SUBCUTANEOUS at 07:13

## 2017-08-10 RX ADMIN — SODIUM CHLORIDE 3 MILLILITER(S): 9 INJECTION INTRAMUSCULAR; INTRAVENOUS; SUBCUTANEOUS at 22:23

## 2017-08-10 RX ADMIN — ENOXAPARIN SODIUM 30 MILLIGRAM(S): 100 INJECTION SUBCUTANEOUS at 11:24

## 2017-08-10 RX ADMIN — Medication 650 MILLIGRAM(S): at 20:08

## 2017-08-10 NOTE — PROGRESS NOTE ADULT - SUBJECTIVE AND OBJECTIVE BOX
Vital Signs Last 24 Hrs  T(C): 36.7 (10 Aug 2017 12:00), Max: 36.7 (09 Aug 2017 20:20)  T(F): 98 (10 Aug 2017 12:00), Max: 98.1 (10 Aug 2017 05:05)  HR: 71 (10 Aug 2017 12:00) (60 - 71)  BP: 105/53 (10 Aug 2017 12:00) (105/53 - 135/63)  BP(mean): --  RR: 18 (10 Aug 2017 12:00) (16 - 18)  SpO2: 97% (10 Aug 2017 12:00) (97% - 100%)    I&O's Detail    09 Aug 2017 07:01  -  10 Aug 2017 07:00  --------------------------------------------------------  IN:    dextrose 5% + sodium chloride 0.45%: 1400 mL    Oral Fluid: 400 mL  Total IN: 1800 mL    OUT:    Drain: 240 mL    Ileostomy: 325 mL    Indwelling Catheter - Urethral: 350 mL    Voided: 2000 mL  Total OUT: 2915 mL    Total NET: -1115 mL      10 Aug 2017 07:01  -  10 Aug 2017 14:23  --------------------------------------------------------  IN:  Total IN: 0 mL    OUT:    Drain: 15 mL    Ileostomy: 50 mL    Voided: 700 mL  Total OUT: 765 mL    Total NET: -765 mL                                11.4   2.98  )-----------( 220      ( 10 Aug 2017 06:36 )             33.8       08-10    140  |  102  |  4<L>  ----------------------------<  125<H>  4.0   |  28  |  0.37<L>    Ca    9.0      10 Aug 2017 06:36  Phos  3.7     08-10  Mg     1.8     08-10    tolerating diet            PLAN:  D/C J-P tomorrow  Remove red rubber catheter from ostomy tomorrow  Discharge home tomorrow

## 2017-08-10 NOTE — PROGRESS NOTE ADULT - SUBJECTIVE AND OBJECTIVE BOX
Anesthesia Pain Management Service    SUBJECTIVE: Pt now off IV PCA without problems reported.    Therapy:	  [ X] IV PCA	   [ ] Epidural           [ ] s/p Spinal Opoid              [ ] Postpartum infusion	  [ ] Patient controlled regional anesthesia (PCRA)    [ ] prn Analgesics    Allergies    No Known Allergies    Intolerances      MEDICATIONS  (STANDING):  enoxaparin Injectable 30 milliGRAM(s) SubCutaneous daily  sodium chloride 0.9% lock flush 3 milliLiter(s) IV Push every 8 hours    MEDICATIONS  (PRN):  oxyCODONE    IR 5 milliGRAM(s) Oral every 3 hours PRN Severe Pain (7 - 10)  oxyCODONE    IR 2.5 milliGRAM(s) Oral every 3 hours PRN Moderate Pain (4 - 6)  acetaminophen   Tablet. 650 milliGRAM(s) Oral every 6 hours PRN Mild Pain (1 - 3)      OBJECTIVE:   [X] No new signs     [ ] Other:    Side Effects:  [X ] None			[ ] Other:    Assessment of Catheter Site:		[ ] Intact		[ ] Other:    ASSESSMENT/PLAN  [ ] Continue current therapy    [X ] Therapy changed to:    [ ] IV PCA       [ ] Epidural     [ X] prn Analgesics     Comments: Opioids or Adjuvent analgesics to be used at this point.    Patient was seen 08-10-17 @ 15:39

## 2017-08-10 NOTE — DISCHARGE NOTE ADULT - INSTRUCTIONS
Follow a low residue/low fiber diet If you have a fever greater than 100.4, chest pain, SOB or persistent pain not controlled by your prescribed medications, call your surgeon or come back to the emergency room. Monitor drain output daily, any foul odor or presence of pus, call your surgeon.

## 2017-08-10 NOTE — DISCHARGE NOTE ADULT - MEDICATION SUMMARY - MEDICATIONS TO TAKE
I will START or STAY ON the medications listed below when I get home from the hospital:    MVI 1 tab orally daily  last dose 7/24  --     -- Indication: For Multivitamin    acetaminophen 325 mg oral tablet  -- 2 tab(s) by mouth every 6 hours, As needed, Mild Pain (1 - 3)  -- Indication: For pain control    oxyCODONE 5 mg oral tablet  -- 1 tab(s) by mouth every 4 hours, As Needed, Severe Pain MDD:6  -- Indication: For pain control    gabapentin 400 mg oral capsule  -- 1 cap(s) by mouth 2 times a day  -- Indication: For pain control    carvedilol 25 mg oral tablet  -- 1 tab(s) by mouth 2 times a day  -- Indication: For htn    chlorthalidone 25 mg oral tablet  -- 1 tab(s) by mouth once a day  -- Indication: For diuretic    tiZANidine 4 mg oral tablet  -- 2 tab(s) by mouth once a day  -- Indication: For Muscle relaxant    hydrALAZINE 25 mg oral tablet  -- 1 tab(s) by mouth 3 times a day  -- Indication: For htn

## 2017-08-10 NOTE — DISCHARGE NOTE ADULT - MEDICATION SUMMARY - MEDICATIONS TO STOP TAKING
I will STOP taking the medications listed below when I get home from the hospital:    Afrin 0.05% nasal spray  -- 2 spray(s) into nose 2 times a day    meloxicam 7.5 mg oral tablet  -- 1 tab(s) by mouth once a day, As Needed  last dose 7/24    oxycodone-acetaminophen 7.5 mg-325 mg oral tablet, extended release  -- 2 tab(s) by mouth every 12 hours, As Needed

## 2017-08-10 NOTE — DISCHARGE NOTE ADULT - ADDITIONAL INSTRUCTIONS
You will have visiting nurse services to assist you with your ostomy care You will have visiting nurse services to assist you with your ostomy care. You will be discharged with a SARA drain. You will need to empty it and record outputs accurately. This will be taught to you by the nursing staff. Please do not remove the SARA drain. It will be removed in the office. Please bring to the office accurate records of output.

## 2017-08-10 NOTE — DISCHARGE NOTE ADULT - PLAN OF CARE
You went ot the OR for a low anterior resection, diverting loop ileostomy Follow a low residue/low fiber diet. Follow up with Dr. Moore your surgeon, call for an appointment. Please follow up with your primary care physician regarding your hospitalization. Do not drive while taking pain medications

## 2017-08-10 NOTE — DISCHARGE NOTE ADULT - CONDITIONS AT DISCHARGE
Patient hemodynamically stable, vitals stable with no signs of distress. IV removed, pt discharged home per MD orders with home care nurse to visit for osotmy care. Patient and family instructed on proper technique of emptying SARA drain, with instructions to follow up with MD per orders. No complaints of SOB, chest pain, falls/injuries.

## 2017-08-10 NOTE — PROGRESS NOTE ADULT - SUBJECTIVE AND OBJECTIVE BOX
D Team Surgery     Subjective:   JOCE EMMANUEL is a 62y Female with history of rectal cancer POD9 from laparotomy, low anterior resection with hand-sewn anastomosis, and loop ileostomy. Pain well controlled. Baker removed on 8/9. Patient voiding without issue. Tolerating CLD without nausea or pain. Ostomy functioning. OOB to chair yesterday. Pain controlled with PO medications.    Objective:  Allergies:  - No Known Allergies    PMH:  - Malignant neoplasm of rectum    PSH:   - Low anterior resection  - Laparotomy  - Loop Ileostomy     Meds:   - enoxaparin Injectable 30 milliGRAM(s) SubCutaneous daily  - PO pain medications     Vital Signs:  - T(C): 36.6 (10 Aug 2017 07:43), Max: 36.8 (09 Aug 2017 13:00)  - HR: 63 (10 Aug 2017 07:43) (60 - 83)  - BP: 127/67 (10 Aug 2017 07:43) (125/70 - 135/63)  - RR: 18 (10 Aug 2017 07:43) (16 - 18)  - SpO2: 97% (10 Aug 2017 07:43) (97% - 100%)    I&O's Detail:  09 Aug 2017 07:01  -  10 Aug 2017 07:00  --------------------------------------------------------  IN:    dextrose 5% + sodium chloride 0.45%: 1400 mL    Oral Fluid: 400 mL  Total IN: 1800 mL  OUT:    Drain: 240 mL    Ileostomy: 325 mL    Indwelling Catheter - Urethral: 350 mL    Voided: 2000 mL  Total OUT: 2915 mL  Total NET: -1115 mL  10 Aug 2017 07:01  -  10 Aug 2017 10:02  --------------------------------------------------------  IN:  Total IN: 0 mL  OUT:    Drain: 15 mL    Ileostomy: 50 mL    Voided: 400 mL  Total OUT: 465 mL  Total NET: -465 mL    Physical Exam:   - General: alert, interactive, NAD  - Pulm: breathing comfortably   - Abd: RLQ ostomy liquid stool in bag, LLQ SARA with serosanguinous output, soft, NTD, minimally distended     Labs:                         11.4   2.98  )-----------( 220      ( 10 Aug 2017 06:36 )             33.8   08-10    140  |  102  |  4<L>  ----------------------------<  125<H>  4.0   |  28  |  0.37<L>    Ca    9.0      10 Aug 2017 06:36  Phos  3.7     08-10  Mg     1.8     08-10    Assessment:   JOCE EMMANUEL is a 62y Female  POD 9 from laparotomy, low anterior resection with hand-sewn anastomosis, and loop ileostomy. Pain well controlled on PO medications. Ostomy functioning well and patient tolerating CLD. Baker out and voiding without issue.     Plan:   - Low-residual diet   - Monitor drain output for increase in volume or change in quality to sanguinous   - Ambulate   - Monitor abdominal exam for increasing distention with LRD

## 2017-08-10 NOTE — DISCHARGE NOTE ADULT - CARE PROVIDER_API CALL
Kory Moore (MD), Surgery  [address] [place] [id number]  Phone: [phone]  Fax: [phone]

## 2017-08-10 NOTE — DISCHARGE NOTE ADULT - CARE PLAN
Principal Discharge DX:	S/P ileostomy  Goal:	You went ot the OR for a low anterior resection, diverting loop ileostomy  Instructions for follow-up, activity and diet:	Follow a low residue/low fiber diet. Follow up with Dr. Moore your surgeon, call for an appointment. Please follow up with your primary care physician regarding your hospitalization. Do not drive while taking pain medications

## 2017-08-11 VITALS
RESPIRATION RATE: 18 BRPM | SYSTOLIC BLOOD PRESSURE: 103 MMHG | HEART RATE: 94 BPM | DIASTOLIC BLOOD PRESSURE: 71 MMHG | TEMPERATURE: 98 F | OXYGEN SATURATION: 98 %

## 2017-08-11 LAB — SURGICAL PATHOLOGY STUDY: SIGNIFICANT CHANGE UP

## 2017-08-11 PROCEDURE — 99238 HOSP IP/OBS DSCHRG MGMT 30/<: CPT | Mod: 24

## 2017-08-11 RX ORDER — ACETAMINOPHEN 500 MG
2 TABLET ORAL
Qty: 0 | Refills: 0 | COMMUNITY
Start: 2017-08-11

## 2017-08-11 RX ORDER — OXYCODONE HYDROCHLORIDE 5 MG/1
1 TABLET ORAL
Qty: 18 | Refills: 0 | OUTPATIENT
Start: 2017-08-11 | End: 2017-08-14

## 2017-08-11 RX ORDER — MELOXICAM 15 MG/1
1 TABLET ORAL
Qty: 0 | Refills: 0 | COMMUNITY

## 2017-08-11 RX ORDER — OXYMETAZOLINE HYDROCHLORIDE 0.5 MG/ML
2 SPRAY NASAL
Qty: 0 | Refills: 0 | COMMUNITY

## 2017-08-11 RX ADMIN — Medication 650 MILLIGRAM(S): at 18:00

## 2017-08-11 RX ADMIN — Medication 650 MILLIGRAM(S): at 11:15

## 2017-08-11 RX ADMIN — Medication 650 MILLIGRAM(S): at 18:52

## 2017-08-11 RX ADMIN — SODIUM CHLORIDE 3 MILLILITER(S): 9 INJECTION INTRAMUSCULAR; INTRAVENOUS; SUBCUTANEOUS at 05:03

## 2017-08-11 RX ADMIN — ENOXAPARIN SODIUM 30 MILLIGRAM(S): 100 INJECTION SUBCUTANEOUS at 11:15

## 2017-08-11 RX ADMIN — SODIUM CHLORIDE 3 MILLILITER(S): 9 INJECTION INTRAMUSCULAR; INTRAVENOUS; SUBCUTANEOUS at 11:13

## 2017-08-11 RX ADMIN — Medication 650 MILLIGRAM(S): at 12:00

## 2017-08-11 NOTE — PROGRESS NOTE ADULT - SUBJECTIVE AND OBJECTIVE BOX
D Team Surgery     Subjective:   JOCE EMMANUEL is a 62y Female with history of rectal cancer POD10 from laparotomy, low anterior resection with hand-sewn anastomosis, and loop ileostomy. Baker removed on 8/9. Patient voiding without issue. Tolerating CLD without nausea or pain. Ostomy functioning. OOB to chair and walking in hallway yesterday. Pain controlled with PO medications.    Objective:  Allergies:  - No Known Allergies    PMH:  - Malignant neoplasm of rectum    PSH:   - Low anterior resection  - Laparotomy  - Loop Ileostomy     Meds:   - enoxaparin Injectable 30 milliGRAM(s) SubCutaneous daily  - PO pain medications     Vital Signs:  - T(C): 36.8 (11 Aug 2017 05:13), Max: 37.6 (10 Aug 2017 19:57)  - HR: 69 (11 Aug 2017 05:13) (63 - 87)  - BP: 142/70 (11 Aug 2017 05:13) (105/53 - 147/70)  - RR: 18 (11 Aug 2017 05:13) (16 - 18)  - SpO2: 100% (11 Aug 2017 05:13) (97% - 100%)    I&O's:  10 Aug 2017 07:01  -  11 Aug 2017 07:00  --------------------------------------------------------  IN:  Total IN: 0 mL  OUT:    Drain: 85 mL    Ileostomy: 50 mL    Voided: 1100 mL  Total OUT: 1235 mL  Total NET: -1235 mL    Physical Exam:   - General: alert, interactive, NAD  - Pulm: breathing comfortably   - Abd: RLQ ostomy liquid stool in bag, LLQ SARA with serosanguinous output, soft, NTD, minimally distended     Assessment:   JOCE EMMANUEL is a 62y Female  POD 10 from laparotomy, low anterior resection with hand-sewn anastomosis, and loop ileostomy. Pain well controlled on PO medications. Ostomy functioning well and patient tolerating LRD. Baker out and voiding without issue. The patient had ostomy teaching and feels comfortably changing the bag. Now appropriate for discharge home.     Plan:   - Discharge home   - Arrange follow up for outpatient care  - SARA drain teaching.

## 2017-08-15 PROBLEM — Z00.00 ENCOUNTER FOR PREVENTIVE HEALTH EXAMINATION: Noted: 2017-08-15

## 2017-08-16 ENCOUNTER — APPOINTMENT (OUTPATIENT)
Dept: SURGICAL ONCOLOGY | Facility: CLINIC | Age: 62
End: 2017-08-16
Payer: MEDICAID

## 2017-08-16 VITALS
OXYGEN SATURATION: 99 % | BODY MASS INDEX: 17.3 KG/M2 | TEMPERATURE: 97.5 F | DIASTOLIC BLOOD PRESSURE: 71 MMHG | WEIGHT: 94 LBS | RESPIRATION RATE: 14 BRPM | SYSTOLIC BLOOD PRESSURE: 118 MMHG | HEIGHT: 62 IN

## 2017-08-16 PROCEDURE — 99024 POSTOP FOLLOW-UP VISIT: CPT

## 2017-08-30 ENCOUNTER — INPATIENT (INPATIENT)
Facility: HOSPITAL | Age: 62
LOS: 3 days | Discharge: HOME | End: 2017-09-03
Attending: SPECIALIST | Admitting: SPECIALIST
Payer: MEDICAID

## 2017-08-30 VITALS
HEIGHT: 62 IN | OXYGEN SATURATION: 100 % | TEMPERATURE: 98 F | SYSTOLIC BLOOD PRESSURE: 157 MMHG | RESPIRATION RATE: 18 BRPM | DIASTOLIC BLOOD PRESSURE: 94 MMHG | WEIGHT: 95.02 LBS | HEART RATE: 86 BPM

## 2017-08-30 DIAGNOSIS — Z90.49 ACQUIRED ABSENCE OF OTHER SPECIFIED PARTS OF DIGESTIVE TRACT: Chronic | ICD-10-CM

## 2017-08-30 LAB
ALBUMIN SERPL ELPH-MCNC: 4.1 G/DL — SIGNIFICANT CHANGE UP (ref 3.3–5)
ALP SERPL-CCNC: 41 U/L — SIGNIFICANT CHANGE UP (ref 40–120)
ALT FLD-CCNC: 24 U/L — SIGNIFICANT CHANGE UP (ref 4–33)
AST SERPL-CCNC: 31 U/L — SIGNIFICANT CHANGE UP (ref 4–32)
BASE EXCESS BLDV CALC-SCNC: 4.4 MMOL/L — SIGNIFICANT CHANGE UP
BASOPHILS # BLD AUTO: 0.02 K/UL — SIGNIFICANT CHANGE UP (ref 0–0.2)
BASOPHILS NFR BLD AUTO: 0.4 % — SIGNIFICANT CHANGE UP (ref 0–2)
BILIRUB SERPL-MCNC: 0.9 MG/DL — SIGNIFICANT CHANGE UP (ref 0.2–1.2)
BLOOD GAS VENOUS - CREATININE: 0.59 MG/DL — SIGNIFICANT CHANGE UP (ref 0.5–1.3)
BUN SERPL-MCNC: 17 MG/DL — SIGNIFICANT CHANGE UP (ref 7–23)
CALCIUM SERPL-MCNC: 9.7 MG/DL — SIGNIFICANT CHANGE UP (ref 8.4–10.5)
CHLORIDE BLDV-SCNC: 100 MMOL/L — SIGNIFICANT CHANGE UP (ref 96–108)
CHLORIDE SERPL-SCNC: 97 MMOL/L — LOW (ref 98–107)
CO2 SERPL-SCNC: 22 MMOL/L — SIGNIFICANT CHANGE UP (ref 22–31)
CREAT SERPL-MCNC: 0.58 MG/DL — SIGNIFICANT CHANGE UP (ref 0.5–1.3)
EOSINOPHIL # BLD AUTO: 0.02 K/UL — SIGNIFICANT CHANGE UP (ref 0–0.5)
EOSINOPHIL NFR BLD AUTO: 0.4 % — SIGNIFICANT CHANGE UP (ref 0–6)
GAS PNL BLDV: 128 MMOL/L — LOW (ref 136–146)
GLUCOSE BLDV-MCNC: 109 — HIGH (ref 70–99)
GLUCOSE SERPL-MCNC: 101 MG/DL — HIGH (ref 70–99)
HCO3 BLDV-SCNC: 27 MMOL/L — SIGNIFICANT CHANGE UP (ref 20–27)
HCT VFR BLD CALC: 36.4 % — SIGNIFICANT CHANGE UP (ref 34.5–45)
HCT VFR BLDV CALC: 41 % — SIGNIFICANT CHANGE UP (ref 34.5–45)
HGB BLD-MCNC: 12.7 G/DL — SIGNIFICANT CHANGE UP (ref 11.5–15.5)
HGB BLDV-MCNC: 13.3 G/DL — SIGNIFICANT CHANGE UP (ref 11.5–15.5)
IMM GRANULOCYTES # BLD AUTO: 0.01 # — SIGNIFICANT CHANGE UP
IMM GRANULOCYTES NFR BLD AUTO: 0.2 % — SIGNIFICANT CHANGE UP (ref 0–1.5)
LACTATE BLDV-MCNC: 1.1 MMOL/L — SIGNIFICANT CHANGE UP (ref 0.5–2)
LIDOCAIN IGE QN: 102.4 U/L — HIGH (ref 7–60)
LYMPHOCYTES # BLD AUTO: 0.77 K/UL — LOW (ref 1–3.3)
LYMPHOCYTES # BLD AUTO: 14.1 % — SIGNIFICANT CHANGE UP (ref 13–44)
MCHC RBC-ENTMCNC: 32.6 PG — SIGNIFICANT CHANGE UP (ref 27–34)
MCHC RBC-ENTMCNC: 34.9 % — SIGNIFICANT CHANGE UP (ref 32–36)
MCV RBC AUTO: 93.3 FL — SIGNIFICANT CHANGE UP (ref 80–100)
MONOCYTES # BLD AUTO: 0.43 K/UL — SIGNIFICANT CHANGE UP (ref 0–0.9)
MONOCYTES NFR BLD AUTO: 7.8 % — SIGNIFICANT CHANGE UP (ref 2–14)
NEUTROPHILS # BLD AUTO: 4.23 K/UL — SIGNIFICANT CHANGE UP (ref 1.8–7.4)
NEUTROPHILS NFR BLD AUTO: 77.1 % — HIGH (ref 43–77)
NRBC # FLD: 0 — SIGNIFICANT CHANGE UP
PCO2 BLDV: 46 MMHG — SIGNIFICANT CHANGE UP (ref 41–51)
PH BLDV: 7.41 PH — SIGNIFICANT CHANGE UP (ref 7.32–7.43)
PLATELET # BLD AUTO: 264 K/UL — SIGNIFICANT CHANGE UP (ref 150–400)
PMV BLD: 8.2 FL — SIGNIFICANT CHANGE UP (ref 7–13)
PO2 BLDV: 45 MMHG — HIGH (ref 35–40)
POTASSIUM BLDV-SCNC: 3.8 MMOL/L — SIGNIFICANT CHANGE UP (ref 3.4–4.5)
POTASSIUM SERPL-MCNC: 4.1 MMOL/L — SIGNIFICANT CHANGE UP (ref 3.5–5.3)
POTASSIUM SERPL-SCNC: 4.1 MMOL/L — SIGNIFICANT CHANGE UP (ref 3.5–5.3)
PROT SERPL-MCNC: 7.6 G/DL — SIGNIFICANT CHANGE UP (ref 6–8.3)
RBC # BLD: 3.9 M/UL — SIGNIFICANT CHANGE UP (ref 3.8–5.2)
RBC # FLD: 12.2 % — SIGNIFICANT CHANGE UP (ref 10.3–14.5)
SAO2 % BLDV: 75.9 % — SIGNIFICANT CHANGE UP (ref 60–85)
SODIUM SERPL-SCNC: 137 MMOL/L — SIGNIFICANT CHANGE UP (ref 135–145)
WBC # BLD: 5.48 K/UL — SIGNIFICANT CHANGE UP (ref 3.8–10.5)
WBC # FLD AUTO: 5.48 K/UL — SIGNIFICANT CHANGE UP (ref 3.8–10.5)

## 2017-08-30 RX ORDER — ACETAMINOPHEN 500 MG
1000 TABLET ORAL ONCE
Qty: 0 | Refills: 0 | Status: DISCONTINUED | OUTPATIENT
Start: 2017-08-30 | End: 2017-08-30

## 2017-08-30 RX ORDER — ACETAMINOPHEN 500 MG
750 TABLET ORAL ONCE
Qty: 0 | Refills: 0 | Status: COMPLETED | OUTPATIENT
Start: 2017-08-30 | End: 2017-08-30

## 2017-08-30 RX ORDER — SODIUM CHLORIDE 9 MG/ML
1000 INJECTION INTRAMUSCULAR; INTRAVENOUS; SUBCUTANEOUS ONCE
Qty: 0 | Refills: 0 | Status: COMPLETED | OUTPATIENT
Start: 2017-08-30 | End: 2017-08-30

## 2017-08-30 RX ADMIN — Medication 300 MILLIGRAM(S): at 21:49

## 2017-08-30 RX ADMIN — SODIUM CHLORIDE 1000 MILLILITER(S): 9 INJECTION INTRAMUSCULAR; INTRAVENOUS; SUBCUTANEOUS at 21:49

## 2017-08-30 RX ADMIN — Medication 750 MILLIGRAM(S): at 22:20

## 2017-08-30 NOTE — ED PROVIDER NOTE - MEDICAL DECISION MAKING DETAILS
will need ct scan to eval for obstruction or surgical complication, will give fluids, check labs, surgical consult

## 2017-08-30 NOTE — ED PROVIDER NOTE - CONSTITUTIONAL, MLM
normal... cachectic, awake, alert, oriented to person, place, time/situation and in moderate distress with bouts of pain

## 2017-08-30 NOTE — ED PROVIDER NOTE - OBJECTIVE STATEMENT
63 yo F with severe intermittent twisting central and lower abdominal pain since lunchtime today. Emptied ileostomy bag this morning as usual, since then there has been minimal output. No nausea or vomiting, no fever or chills. Has been unable to eat, only a small amount of water and tea to drink.    Pt completed chemotherapy for colorectal cancer 6/22/17 and underwent LAR with Dr Moore 8/4/17, has recovered well post operatively, did not take pain medication since leaving hospital other than tylenol, and has been told that she does not need further treatment for her CRC at this stage.

## 2017-08-30 NOTE — ED PROVIDER NOTE - CHIEF COMPLAINT
The patient is a 62y Female complaining of abdominal pain and no ileostomy output 3 weeks post LAR for rectal carcinoma.

## 2017-08-30 NOTE — ED ADULT NURSE NOTE - CHIEF COMPLAINT
The patient is a 62y Female complaining of "twisting of intestines" in abdomen pain since today at 1-2pm, reports had colorectal surgery on aug 4 and colostomy placement, last chemo 6/22/17. Denies n/v/d, fever, chills.

## 2017-08-30 NOTE — ED PROVIDER NOTE - ATTENDING CONTRIBUTION TO CARE
Dr. Crowley: I have personally performed a face to face bedside history and physical examination of this patient. I have discussed the history, examination, review of systems, assessment and plan of management with the resident. I have reviewed the electronic medical record and amended it to reflect my history, review of systems, physical exam, assessment and plan.

## 2017-08-30 NOTE — ED ADULT NURSE NOTE - OBJECTIVE STATEMENT
Received pt in room 25, pt A&Ox4, respirations even and unlabored b/l. Diffused tenderness in abdomen. No redness or drainage noted on surgical site. IVL 20g Angiocath placed on right AC. Labs sent. Awaiting MD chavez. Family at bedside. Will continue to monitor.

## 2017-08-30 NOTE — ED PROVIDER NOTE - PHYSICAL EXAMINATION
Attending Exam - Dr. Crowley: GEN: NAD  HEENT: +PERRL, EOMI  RESP: CTAB  CV: s1s2 RRR ABD: soft/non distended, tender to lower abdomen, ostomy pink with minimal stool in bag  MSK: no deformities/muscle tenderness/decreased range of motion  NEURO: alert/non focal exam  SKIN: normal color/temperature/condition

## 2017-08-30 NOTE — ED ADULT TRIAGE NOTE - CHIEF COMPLAINT QUOTE
pt has colorectal cancer last chemo 6/22/17. Pt had colorectal surgery 3 weeks ago. Started with severe mid abd pain

## 2017-08-31 DIAGNOSIS — K56.69 OTHER INTESTINAL OBSTRUCTION: ICD-10-CM

## 2017-08-31 LAB
APPEARANCE UR: CLEAR — SIGNIFICANT CHANGE UP
APTT BLD: 29 SEC — SIGNIFICANT CHANGE UP (ref 27.5–37.4)
BILIRUB UR-MCNC: NEGATIVE — SIGNIFICANT CHANGE UP
BLD GP AB SCN SERPL QL: NEGATIVE — SIGNIFICANT CHANGE UP
BLOOD UR QL VISUAL: HIGH
BUN SERPL-MCNC: 13 MG/DL — SIGNIFICANT CHANGE UP (ref 7–23)
CALCIUM SERPL-MCNC: 9.3 MG/DL — SIGNIFICANT CHANGE UP (ref 8.4–10.5)
CHLORIDE SERPL-SCNC: 99 MMOL/L — SIGNIFICANT CHANGE UP (ref 98–107)
CO2 SERPL-SCNC: 25 MMOL/L — SIGNIFICANT CHANGE UP (ref 22–31)
COLOR SPEC: YELLOW — SIGNIFICANT CHANGE UP
CREAT SERPL-MCNC: 0.6 MG/DL — SIGNIFICANT CHANGE UP (ref 0.5–1.3)
GLUCOSE SERPL-MCNC: 79 MG/DL — SIGNIFICANT CHANGE UP (ref 70–99)
GLUCOSE UR-MCNC: NEGATIVE — SIGNIFICANT CHANGE UP
HCT VFR BLD CALC: 38.9 % — SIGNIFICANT CHANGE UP (ref 34.5–45)
HGB BLD-MCNC: 13.1 G/DL — SIGNIFICANT CHANGE UP (ref 11.5–15.5)
INR BLD: 0.98 — SIGNIFICANT CHANGE UP (ref 0.88–1.17)
KETONES UR-MCNC: SIGNIFICANT CHANGE UP
LEUKOCYTE ESTERASE UR-ACNC: NEGATIVE — SIGNIFICANT CHANGE UP
LIDOCAIN IGE QN: 38.2 U/L — SIGNIFICANT CHANGE UP (ref 7–60)
MAGNESIUM SERPL-MCNC: 1.9 MG/DL — SIGNIFICANT CHANGE UP (ref 1.6–2.6)
MCHC RBC-ENTMCNC: 32 PG — SIGNIFICANT CHANGE UP (ref 27–34)
MCHC RBC-ENTMCNC: 33.7 % — SIGNIFICANT CHANGE UP (ref 32–36)
MCV RBC AUTO: 94.9 FL — SIGNIFICANT CHANGE UP (ref 80–100)
MUCOUS THREADS # UR AUTO: SIGNIFICANT CHANGE UP
NITRITE UR-MCNC: NEGATIVE — SIGNIFICANT CHANGE UP
NON-SQ EPI CELLS # UR AUTO: <1 — SIGNIFICANT CHANGE UP
NRBC # FLD: 0 — SIGNIFICANT CHANGE UP
PH UR: 6.5 — SIGNIFICANT CHANGE UP (ref 4.6–8)
PHOSPHATE SERPL-MCNC: 3.8 MG/DL — SIGNIFICANT CHANGE UP (ref 2.5–4.5)
PLATELET # BLD AUTO: 252 K/UL — SIGNIFICANT CHANGE UP (ref 150–400)
PMV BLD: 8.1 FL — SIGNIFICANT CHANGE UP (ref 7–13)
POTASSIUM SERPL-MCNC: 3.9 MMOL/L — SIGNIFICANT CHANGE UP (ref 3.5–5.3)
POTASSIUM SERPL-SCNC: 3.9 MMOL/L — SIGNIFICANT CHANGE UP (ref 3.5–5.3)
PROT UR-MCNC: 30 — HIGH
PROTHROM AB SERPL-ACNC: 11 SEC — SIGNIFICANT CHANGE UP (ref 9.8–13.1)
RBC # BLD: 4.1 M/UL — SIGNIFICANT CHANGE UP (ref 3.8–5.2)
RBC # FLD: 12.3 % — SIGNIFICANT CHANGE UP (ref 10.3–14.5)
RBC CASTS # UR COMP ASSIST: SIGNIFICANT CHANGE UP (ref 0–?)
RH IG SCN BLD-IMP: POSITIVE — SIGNIFICANT CHANGE UP
SODIUM SERPL-SCNC: 137 MMOL/L — SIGNIFICANT CHANGE UP (ref 135–145)
SP GR SPEC: > 1.04 — HIGH (ref 1–1.03)
SQUAMOUS # UR AUTO: SIGNIFICANT CHANGE UP
UROBILINOGEN FLD QL: NORMAL E.U. — SIGNIFICANT CHANGE UP (ref 0.1–0.2)
WBC # BLD: 4.36 K/UL — SIGNIFICANT CHANGE UP (ref 3.8–10.5)
WBC # FLD AUTO: 4.36 K/UL — SIGNIFICANT CHANGE UP (ref 3.8–10.5)
WBC UR QL: SIGNIFICANT CHANGE UP (ref 0–?)

## 2017-08-31 PROCEDURE — 71010: CPT | Mod: 26

## 2017-08-31 PROCEDURE — 74177 CT ABD & PELVIS W/CONTRAST: CPT | Mod: 26

## 2017-08-31 RX ORDER — ACETAMINOPHEN 500 MG
1000 TABLET ORAL ONCE
Qty: 0 | Refills: 0 | Status: COMPLETED | OUTPATIENT
Start: 2017-08-31 | End: 2017-08-31

## 2017-08-31 RX ORDER — SODIUM CHLORIDE 9 MG/ML
1000 INJECTION, SOLUTION INTRAVENOUS
Qty: 0 | Refills: 0 | Status: DISCONTINUED | OUTPATIENT
Start: 2017-08-31 | End: 2017-09-03

## 2017-08-31 RX ORDER — ENOXAPARIN SODIUM 100 MG/ML
40 INJECTION SUBCUTANEOUS DAILY
Qty: 0 | Refills: 0 | Status: DISCONTINUED | OUTPATIENT
Start: 2017-08-31 | End: 2017-09-03

## 2017-08-31 RX ADMIN — SODIUM CHLORIDE 85 MILLILITER(S): 9 INJECTION, SOLUTION INTRAVENOUS at 05:24

## 2017-08-31 RX ADMIN — Medication 400 MILLIGRAM(S): at 10:49

## 2017-08-31 RX ADMIN — ENOXAPARIN SODIUM 40 MILLIGRAM(S): 100 INJECTION SUBCUTANEOUS at 11:35

## 2017-08-31 RX ADMIN — Medication 1000 MILLIGRAM(S): at 11:36

## 2017-08-31 RX ADMIN — SODIUM CHLORIDE 85 MILLILITER(S): 9 INJECTION, SOLUTION INTRAVENOUS at 09:37

## 2017-08-31 NOTE — H&P ADULT - PROBLEM SELECTOR PLAN 1
-IVF  -If new onset or worsening pain will assess and treat as necessary.   -NGT if new onset nausea or vomiting.

## 2017-08-31 NOTE — H&P ADULT - ASSESSMENT
62 year old female s/p LAR with diverting loop ileostomy now with SBO. Clinically, her pain is improved, there is gas within her ostomy appliance, and she has not had nausea or vomiting. This is most likely an adhesive SBO, will manage conservatively, nothing by mouth, without nasogastric tube for now. If she has nausea/vomiting will plan for NGT decompression. If pain persists or she clinically worsens, will plan for operative intervention.

## 2017-08-31 NOTE — H&P ADULT - NSHPLABSRESULTS_GEN_ALL_CORE
CBC Full  -  ( 30 Aug 2017 21:00 )  WBC Count : 5.48 K/uL  Hemoglobin : 12.7 g/dL  Hematocrit : 36.4 %  Platelet Count - Automated : 264 K/uL  Mean Cell Volume : 93.3 fL  Mean Cell Hemoglobin : 32.6 pg  Mean Cell Hemoglobin Concentration : 34.9 %  Auto Neutrophil # : 4.23 K/uL  Auto Lymphocyte # : 0.77 K/uL  Auto Monocyte # : 0.43 K/uL  Auto Eosinophil # : 0.02 K/uL  Auto Basophil # : 0.02 K/uL  Auto Neutrophil % : 77.1 %  Auto Lymphocyte % : 14.1 %  Auto Monocyte % : 7.8 %  Auto Eosinophil % : 0.4 %  Auto Basophil % : 0.4 %    08-30    137  |  97<L>  |  17  ----------------------------<  101<H>  4.1   |  22  |  0.58    Ca    9.7      30 Aug 2017 21:00    TPro  7.6  /  Alb  4.1  /  TBili  0.9  /  DBili  x   /  AST  31  /  ALT  24  /  AlkPhos  41  08-30          EXAM:  CT ABDOMEN AND PELVIS IC    PROCEDURE DATE:  Aug 31 2017   INTERPRETATION:  CLINICAL INFORMATION: Abdominal pain with no output from ileostomy. History of rectal adenocarcinoma documented on a prior radiology report.  COMPARISON: CT Abdomen/pelvis on 7/11/2017. Prior PET/CT examination from 4/24/2017.  PROCEDURE: CT of the Abdomen and Pelvis was performed with intravenous contrast.   Intravenous contrast: 90 ml Omnipaque 350. 10 ml discarded.  Oral contrast: None.  Sagittal and coronal reformats were performed.    FINDINGS:    LOWER CHEST: Within normal limits.    LIVER: Small calcified granulomas within the right hepatic lobe appear   unchanged.  BILE DUCTS: Normal caliber.  GALLBLADDER: Within normal limits.  SPLEEN: Within normal limits.  PANCREAS: Within normal limits.  ADRENALS: Within normal limits.  KIDNEYS/URETERS: Within normal limits.    BLADDER: Within normal limits.  REPRODUCTIVE ORGANS: The uterus and adnexa are within normal limits.    BOWEL: The patient is status post interval low anterior resection   procedure. Anastomotic sutures are notable within the rectum. The   previously noted enlarged left-sided perirectal lymph node is not seen on   the current examination. There is a new right lower quadrant ileostomy.   There are mildly dilated loops of small bowel with a gradual transition   point in the lower abdomen/pelvis (602:42-45). The ileostomy is distal to   the obstruction point. There is normal small bowel wall enhancement. No   pneumatosis or portal venous gas.  PERITONEUM: Small amount of ascites.  VESSELS:  Within normal limits.  RETROPERITONEUM: No lymphadenopathy.    ABDOMINAL WALL: Within normal limits.  BONES: Within normal limits    IMPRESSION: Small bowel obstruction with gradual transition point in the   lower abdomen proximal to the right lower quadrant ileostomy.    These findings were discussed with Dr. Martinez at 8/31/2017 12:59 AM by   Dr. Tr Archer with read back confirmation.      TR ARCHER M.D., RADIOLOGY RESIDENT  This document has been electronically signed.  TUNG EMMANUEL M.D., ATTENDING RADIOLOGIST  This document has been electronically signed. Aug 31 2017  1:20AM

## 2017-08-31 NOTE — PROGRESS NOTE ADULT - SUBJECTIVE AND OBJECTIVE BOX
Vital Signs Last 24 Hrs  T(C): 36.6 (31 Aug 2017 05:19), Max: 36.7 (30 Aug 2017 20:10)  T(F): 97.9 (31 Aug 2017 05:19), Max: 98.1 (31 Aug 2017 01:26)  HR: 96 (31 Aug 2017 05:19) (63 - 96)  BP: 156/82 (31 Aug 2017 05:19) (134/69 - 164/71)  BP(mean): --  RR: 18 (31 Aug 2017 05:19) (16 - 20)  SpO2: 100% (31 Aug 2017 05:19) (98% - 100%)    I&O's Detail    30 Aug 2017 07:01  -  31 Aug 2017 07:00  --------------------------------------------------------  IN:    lactated ringers.: 340 mL  Total IN: 340 mL    OUT:    Voided: 300 mL  Total OUT: 300 mL    Total NET: 40 mL                                12.7   5.48  )-----------( 264      ( 30 Aug 2017 21:00 )             36.4       08-30    137  |  97<L>  |  17  ----------------------------<  101<H>  4.1   |  22  |  0.58    Ca    9.7      30 Aug 2017 21:00    TPro  7.6  /  Alb  4.1  /  TBili  0.9  /  DBili  x   /  AST  31  /  ALT  24  /  AlkPhos  41  08-30      PT/INR - ( 31 Aug 2017 01:15 )   PT: 11.0 SEC;   INR: 0.98          PTT - ( 31 Aug 2017 01:15 )  PTT:29.0 SEC    Minimal Ostomy output    PLAN:    Continue N-G drainage

## 2017-08-31 NOTE — H&P ADULT - HISTORY OF PRESENT ILLNESS
This is a very pleasant 62 year old female who presents with abdominal pain which began at the morning of 8/30/17. She has noticed decrease ostomy output and crampy abdominal pain. She had been recovering well from her low anterior resection with  diverting loop ileostomy on 8/4/17. The pain was localized left lower quadrant/right lower quadrant. She describes the pain as dull non-radiating and constant. No alleviating factors could be elicited.  She is accompanied by her daughter and son. She denies any recent trauma. The pain was without fever, chills, nausea, or vomiting. She has not had any recent sick exposures and denied having had foreign travel. She has not had urinary symptoms of urgency, frequency or dysuria.     She has no significant medical history except colon cancer s/p radiation.  She denied having had cardiovascular, pulmonary, renal, hepatic, hematologic, CNS, endocrine. Prior to the current hospitalization she did not take any medications. She has no known medication allergies and she is not allergic to latex. She does not use ethanol or tobacco.

## 2017-08-31 NOTE — CHART NOTE - NSCHARTNOTEFT_GEN_A_CORE
Pt's abdomen is soft, non-tender, non-distended.  She has dark, non-blood stool in her ostomy bag, and she reports that it was changed twice today.  She states that she is not currently in pain, and that she feels like the NGT is helping her.

## 2017-09-01 LAB
BUN SERPL-MCNC: 19 MG/DL — SIGNIFICANT CHANGE UP (ref 7–23)
CALCIUM SERPL-MCNC: 9.3 MG/DL — SIGNIFICANT CHANGE UP (ref 8.4–10.5)
CHLORIDE SERPL-SCNC: 97 MMOL/L — LOW (ref 98–107)
CO2 SERPL-SCNC: 27 MMOL/L — SIGNIFICANT CHANGE UP (ref 22–31)
CREAT SERPL-MCNC: 0.61 MG/DL — SIGNIFICANT CHANGE UP (ref 0.5–1.3)
GLUCOSE SERPL-MCNC: 67 MG/DL — LOW (ref 70–99)
HCT VFR BLD CALC: 35.2 % — SIGNIFICANT CHANGE UP (ref 34.5–45)
HGB BLD-MCNC: 11.8 G/DL — SIGNIFICANT CHANGE UP (ref 11.5–15.5)
MAGNESIUM SERPL-MCNC: 2 MG/DL — SIGNIFICANT CHANGE UP (ref 1.6–2.6)
MCHC RBC-ENTMCNC: 32 PG — SIGNIFICANT CHANGE UP (ref 27–34)
MCHC RBC-ENTMCNC: 33.5 % — SIGNIFICANT CHANGE UP (ref 32–36)
MCV RBC AUTO: 95.4 FL — SIGNIFICANT CHANGE UP (ref 80–100)
NRBC # FLD: 0 — SIGNIFICANT CHANGE UP
PHOSPHATE SERPL-MCNC: 4 MG/DL — SIGNIFICANT CHANGE UP (ref 2.5–4.5)
PLATELET # BLD AUTO: 253 K/UL — SIGNIFICANT CHANGE UP (ref 150–400)
PMV BLD: 8.2 FL — SIGNIFICANT CHANGE UP (ref 7–13)
POTASSIUM SERPL-MCNC: 3.2 MMOL/L — LOW (ref 3.5–5.3)
POTASSIUM SERPL-SCNC: 3.2 MMOL/L — LOW (ref 3.5–5.3)
RBC # BLD: 3.69 M/UL — LOW (ref 3.8–5.2)
RBC # FLD: 12.4 % — SIGNIFICANT CHANGE UP (ref 10.3–14.5)
SODIUM SERPL-SCNC: 146 MMOL/L — HIGH (ref 135–145)
WBC # BLD: 6.36 K/UL — SIGNIFICANT CHANGE UP (ref 3.8–10.5)
WBC # FLD AUTO: 6.36 K/UL — SIGNIFICANT CHANGE UP (ref 3.8–10.5)

## 2017-09-01 RX ORDER — PANTOPRAZOLE SODIUM 20 MG/1
40 TABLET, DELAYED RELEASE ORAL ONCE
Qty: 0 | Refills: 0 | Status: COMPLETED | OUTPATIENT
Start: 2017-09-01 | End: 2017-09-01

## 2017-09-01 RX ORDER — PANTOPRAZOLE SODIUM 20 MG/1
40 TABLET, DELAYED RELEASE ORAL DAILY
Qty: 0 | Refills: 0 | Status: COMPLETED | OUTPATIENT
Start: 2017-09-01 | End: 2017-09-02

## 2017-09-01 RX ORDER — POTASSIUM CHLORIDE 20 MEQ
10 PACKET (EA) ORAL
Qty: 0 | Refills: 0 | Status: COMPLETED | OUTPATIENT
Start: 2017-09-01 | End: 2017-09-01

## 2017-09-01 RX ADMIN — Medication 100 MILLIEQUIVALENT(S): at 12:35

## 2017-09-01 RX ADMIN — SODIUM CHLORIDE 85 MILLILITER(S): 9 INJECTION, SOLUTION INTRAVENOUS at 12:35

## 2017-09-01 RX ADMIN — PANTOPRAZOLE SODIUM 40 MILLIGRAM(S): 20 TABLET, DELAYED RELEASE ORAL at 12:35

## 2017-09-01 RX ADMIN — ENOXAPARIN SODIUM 40 MILLIGRAM(S): 100 INJECTION SUBCUTANEOUS at 12:36

## 2017-09-01 RX ADMIN — Medication 100 MILLIEQUIVALENT(S): at 13:53

## 2017-09-01 RX ADMIN — SODIUM CHLORIDE 100 MILLILITER(S): 9 INJECTION, SOLUTION INTRAVENOUS at 15:20

## 2017-09-01 RX ADMIN — Medication 100 MILLIEQUIVALENT(S): at 15:20

## 2017-09-01 NOTE — PROGRESS NOTE ADULT - SUBJECTIVE AND OBJECTIVE BOX
Vital Signs Last 24 Hrs  T(C): 36.4 (01 Sep 2017 05:08), Max: 36.9 (31 Aug 2017 17:23)  T(F): 97.6 (01 Sep 2017 05:08), Max: 98.5 (31 Aug 2017 17:23)  HR: 81 (01 Sep 2017 05:08) (79 - 96)  BP: 125/64 (01 Sep 2017 05:08) (107/58 - 127/65)  BP(mean): --  RR: 18 (01 Sep 2017 05:08) (18 - 18)  SpO2: 97% (01 Sep 2017 05:08) (97% - 98%)    I&O's Detail    31 Aug 2017 07:01  -  01 Sep 2017 07:00  --------------------------------------------------------  IN:    lactated ringers.: 765 mL  Total IN: 765 mL    OUT:    Nasoenteral Tube: 1300 mL    Voided: 1130 mL  Total OUT: 2430 mL    Total NET: -1665 mL                                11.8   6.36  )-----------( 253      ( 01 Sep 2017 06:30 )             35.2       09-01    146<H>  |  97<L>  |  19  ----------------------------<  67<L>  3.2<L>   |  27  |  0.61    Ca    9.3      01 Sep 2017 06:30  Phos  4.0     09-01  Mg     2.0     09-01    TPro  7.6  /  Alb  4.1  /  TBili  0.9  /  DBili  x   /  AST  31  /  ALT  24  /  AlkPhos  41  08-30      PT/INR - ( 31 Aug 2017 01:15 )   PT: 11.0 SEC;   INR: 0.98          PTT - ( 31 Aug 2017 01:15 )  PTT:29.0 SEC    ostomy functioning    PLAN:    Clamp N-G   Remove N-G at noon if pt. tolerates clamping

## 2017-09-01 NOTE — CHART NOTE - NSCHARTNOTEFT_GEN_A_CORE
Pt's abdomen remains soft, non-tender, non-distended.  She has dark, non-blood stool in her ostomy bag. She appeared to be sleeping comfortably.  Her only discomfort is from the NGT itself.     Rashmi Das MD

## 2017-09-02 LAB
BUN SERPL-MCNC: 19 MG/DL — SIGNIFICANT CHANGE UP (ref 7–23)
CALCIUM SERPL-MCNC: 8.8 MG/DL — SIGNIFICANT CHANGE UP (ref 8.4–10.5)
CHLORIDE SERPL-SCNC: 101 MMOL/L — SIGNIFICANT CHANGE UP (ref 98–107)
CO2 SERPL-SCNC: 28 MMOL/L — SIGNIFICANT CHANGE UP (ref 22–31)
CREAT SERPL-MCNC: 0.52 MG/DL — SIGNIFICANT CHANGE UP (ref 0.5–1.3)
GLUCOSE SERPL-MCNC: 58 MG/DL — LOW (ref 70–99)
HCT VFR BLD CALC: 30.8 % — LOW (ref 34.5–45)
HGB BLD-MCNC: 9.9 G/DL — LOW (ref 11.5–15.5)
MAGNESIUM SERPL-MCNC: 1.9 MG/DL — SIGNIFICANT CHANGE UP (ref 1.6–2.6)
MCHC RBC-ENTMCNC: 31.3 PG — SIGNIFICANT CHANGE UP (ref 27–34)
MCHC RBC-ENTMCNC: 32.1 % — SIGNIFICANT CHANGE UP (ref 32–36)
MCV RBC AUTO: 97.5 FL — SIGNIFICANT CHANGE UP (ref 80–100)
NRBC # FLD: 0 — SIGNIFICANT CHANGE UP
PHOSPHATE SERPL-MCNC: 2.7 MG/DL — SIGNIFICANT CHANGE UP (ref 2.5–4.5)
PLATELET # BLD AUTO: 195 K/UL — SIGNIFICANT CHANGE UP (ref 150–400)
PMV BLD: 8.4 FL — SIGNIFICANT CHANGE UP (ref 7–13)
POTASSIUM SERPL-MCNC: 3.5 MMOL/L — SIGNIFICANT CHANGE UP (ref 3.5–5.3)
POTASSIUM SERPL-SCNC: 3.5 MMOL/L — SIGNIFICANT CHANGE UP (ref 3.5–5.3)
RBC # BLD: 3.16 M/UL — LOW (ref 3.8–5.2)
RBC # FLD: 12.6 % — SIGNIFICANT CHANGE UP (ref 10.3–14.5)
SODIUM SERPL-SCNC: 145 MMOL/L — SIGNIFICANT CHANGE UP (ref 135–145)
WBC # BLD: 3.47 K/UL — LOW (ref 3.8–10.5)
WBC # FLD AUTO: 3.47 K/UL — LOW (ref 3.8–10.5)

## 2017-09-02 RX ADMIN — PANTOPRAZOLE SODIUM 40 MILLIGRAM(S): 20 TABLET, DELAYED RELEASE ORAL at 14:51

## 2017-09-02 RX ADMIN — SODIUM CHLORIDE 100 MILLILITER(S): 9 INJECTION, SOLUTION INTRAVENOUS at 21:13

## 2017-09-02 RX ADMIN — SODIUM CHLORIDE 100 MILLILITER(S): 9 INJECTION, SOLUTION INTRAVENOUS at 02:05

## 2017-09-02 RX ADMIN — ENOXAPARIN SODIUM 40 MILLIGRAM(S): 100 INJECTION SUBCUTANEOUS at 14:50

## 2017-09-02 NOTE — PHYSICAL THERAPY INITIAL EVALUATION ADULT - PERTINENT HX OF CURRENT PROBLEM, REHAB EVAL
Pt is a 62 year old female who presents with abdominal pain which began at the morning of 8/30/17. She has noticed decrease ostomy output and crampy abdominal pain. She had been recovering well from her low anterior resection with diverting loop ileostomy on 8/4/17. The pain was localized left lower quadrant/right lower quadrant. She describes the pain as dull non-radiating and constant. CT abd/pelvis +SBO.

## 2017-09-02 NOTE — PROGRESS NOTE ADULT - ASSESSMENT
62 F PMH rectal cancer s/p radiation, low anterior resection with diverting loop ileostomy who presented with SBO, currently hemodynamically stable.    - Monitor GI function  - DVT ppx  - Encourage OOB/IS

## 2017-09-02 NOTE — PROGRESS NOTE ADULT - SUBJECTIVE AND OBJECTIVE BOX
DX:  SBO          SUBJECTIVE    Pt had NGT pulled yesterday after clamp trial. This am pt denies N/V. Pain well controlled. Has been OOB.    10-point review of systems completed and negative except as noted above.    lactated ringers. 1000 milliLiter(s) IV Continuous <Continuous>  enoxaparin Injectable 40 milliGRAM(s) SubCutaneous daily  pantoprazole  Injectable 40 milliGRAM(s) IV Push daily            OBJECTIVE    T(C): 36.7 (09-02-17 @ 05:26), Max: 36.9 (09-01-17 @ 21:35)  HR: 62 (09-02-17 @ 08:38) (62 - 76)  BP: 103/46 (09-02-17 @ 08:38) (96/40 - 110/49)  RR: 20 (09-02-17 @ 08:38) (16 - 20)  SpO2: 96% (09-02-17 @ 08:38) (95% - 98%)    09-01-17 @ 07:01  -  09-02-17 @ 07:00  --------------------------------------------------------  IN: 1395 mL / OUT: 625 mL / NET: 770 mL        EXAM  General: NAD  Pulm: CTAB  CV: Regular rate and rhythm  Abdomen: Soft, NT/ND. No palpable masses. Ostomy in place, with some air and enteric content in bag.  Extremities: WWPx4, Grossly symmetric    LABS                        9.9    3.47  )-----------( 195      ( 02 Sep 2017 05:36 )             30.8     09-02    145  |  101  |  19  ----------------------------<  58<L>  3.5   |  28  |  0.52    Ca    8.8      02 Sep 2017 05:36  Phos  2.7     09-02  Mg     1.9     09-02

## 2017-09-02 NOTE — PHYSICAL THERAPY INITIAL EVALUATION ADULT - DISCHARGE DISPOSITION, PT EVAL
home/Patient at baseline functional status which is independent in transfers and ambulation./no skilled PT needs

## 2017-09-03 ENCOUNTER — TRANSCRIPTION ENCOUNTER (OUTPATIENT)
Age: 62
End: 2017-09-03

## 2017-09-03 VITALS
HEART RATE: 61 BPM | OXYGEN SATURATION: 99 % | SYSTOLIC BLOOD PRESSURE: 123 MMHG | TEMPERATURE: 98 F | RESPIRATION RATE: 18 BRPM | DIASTOLIC BLOOD PRESSURE: 65 MMHG

## 2017-09-03 LAB
BUN SERPL-MCNC: 11 MG/DL — SIGNIFICANT CHANGE UP (ref 7–23)
CALCIUM SERPL-MCNC: 8.7 MG/DL — SIGNIFICANT CHANGE UP (ref 8.4–10.5)
CHLORIDE SERPL-SCNC: 100 MMOL/L — SIGNIFICANT CHANGE UP (ref 98–107)
CO2 SERPL-SCNC: 32 MMOL/L — HIGH (ref 22–31)
CREAT SERPL-MCNC: 0.48 MG/DL — LOW (ref 0.5–1.3)
GLUCOSE SERPL-MCNC: 91 MG/DL — SIGNIFICANT CHANGE UP (ref 70–99)
HCT VFR BLD CALC: 30.6 % — LOW (ref 34.5–45)
HGB BLD-MCNC: 10.2 G/DL — LOW (ref 11.5–15.5)
MAGNESIUM SERPL-MCNC: 1.7 MG/DL — SIGNIFICANT CHANGE UP (ref 1.6–2.6)
MCHC RBC-ENTMCNC: 32.6 PG — SIGNIFICANT CHANGE UP (ref 27–34)
MCHC RBC-ENTMCNC: 33.3 % — SIGNIFICANT CHANGE UP (ref 32–36)
MCV RBC AUTO: 97.8 FL — SIGNIFICANT CHANGE UP (ref 80–100)
NRBC # FLD: 0 — SIGNIFICANT CHANGE UP
PHOSPHATE SERPL-MCNC: 2.8 MG/DL — SIGNIFICANT CHANGE UP (ref 2.5–4.5)
PLATELET # BLD AUTO: 177 K/UL — SIGNIFICANT CHANGE UP (ref 150–400)
PMV BLD: 8.5 FL — SIGNIFICANT CHANGE UP (ref 7–13)
POTASSIUM SERPL-MCNC: 3.9 MMOL/L — SIGNIFICANT CHANGE UP (ref 3.5–5.3)
POTASSIUM SERPL-SCNC: 3.9 MMOL/L — SIGNIFICANT CHANGE UP (ref 3.5–5.3)
RBC # BLD: 3.13 M/UL — LOW (ref 3.8–5.2)
RBC # FLD: 12.3 % — SIGNIFICANT CHANGE UP (ref 10.3–14.5)
SODIUM SERPL-SCNC: 142 MMOL/L — SIGNIFICANT CHANGE UP (ref 135–145)
WBC # BLD: 3.14 K/UL — LOW (ref 3.8–10.5)
WBC # FLD AUTO: 3.14 K/UL — LOW (ref 3.8–10.5)

## 2017-09-03 RX ADMIN — SODIUM CHLORIDE 100 MILLILITER(S): 9 INJECTION, SOLUTION INTRAVENOUS at 07:46

## 2017-09-03 RX ADMIN — ENOXAPARIN SODIUM 40 MILLIGRAM(S): 100 INJECTION SUBCUTANEOUS at 11:26

## 2017-09-03 NOTE — DISCHARGE NOTE ADULT - HOSPITAL COURSE
Pt was admitted on 8/31/17 with abdominal pain and was found to have a SBO.  NGT was placed, and the patient decompressed with no other issues.  Her abdomen was soft, non-tender, and non-distended, and she passed he NGT clamp trial.  She was advanced to CLD and then o a regular diet.  Her ostomy has had good output her entire admission,

## 2017-09-03 NOTE — DISCHARGE NOTE ADULT - CARE PLAN
Principal Discharge DX:	SBO (small bowel obstruction)  Goal:	Tolerating regular diet, no abdominal pain, having good output from ostomy  Instructions for follow-up, activity and diet:	Please follow up with Dr. Moore in 1-2 weeks after discharge

## 2017-09-03 NOTE — DISCHARGE NOTE ADULT - CARE PROVIDER_API CALL
Kory Moore (MD), Surgery  [address] [place] [id number]  Phone: [phone]  Fax: [phone]

## 2017-09-03 NOTE — DISCHARGE NOTE ADULT - MEDICATION SUMMARY - MEDICATIONS TO TAKE
I will START or STAY ON the medications listed below when I get home from the hospital:    MVI 1 tab orally daily  last dose 7/24  --     -- Indication: For Overall health    acetaminophen 325 mg oral tablet  -- 2 tab(s) by mouth every 6 hours, As needed, Mild Pain (1 - 3)  -- Indication: For Pain as needed    oxyCODONE 5 mg oral tablet  -- 1 tab(s) by mouth every 4 hours, As Needed, Severe Pain MDD:6  -- Indication: For Severe pain as needed    gabapentin 400 mg oral capsule  -- 1 cap(s) by mouth 2 times a day  -- Indication: For neuropathy/pain    carvedilol 25 mg oral tablet  -- 1 tab(s) by mouth 2 times a day  -- Indication: For HTN    chlorthalidone 25 mg oral tablet  -- 1 tab(s) by mouth once a day  -- Indication: For HTN    tiZANidine 4 mg oral tablet  -- 2 tab(s) by mouth once a day  -- Indication: For Muscle relaxant    hydrALAZINE 25 mg oral tablet  -- 1 tab(s) by mouth 3 times a day  -- Indication: For HTN

## 2017-09-03 NOTE — PROGRESS NOTE ADULT - SUBJECTIVE AND OBJECTIVE BOX
D-Team (Surgical Oncology) Daily Progress Note    SUBJECTIVE:  Pt seen and examined, and is resting comfortably in bed. No acute events overnight. She reports no pain at this time.  She is tolerating clears, and feels like she is ready for a regular diet. She states that she is having gas and stool in her ostomy bag, and emptied the bag multiple times yesterday.  Pt has no complaints at this time.     OBJECTIVE:  Vital Signs Last 24 Hrs  T(C): 36.7 (03 Sep 2017 07:44), Max: 36.9 (02 Sep 2017 12:13)  T(F): 98 (03 Sep 2017 07:44), Max: 98.5 (02 Sep 2017 16:43)  HR: 53 (03 Sep 2017 07:44) (51 - 68)  BP: 116/57 (03 Sep 2017 07:44) (100/51 - 116/57)  BP(mean): --  RR: 18 (03 Sep 2017 07:44) (17 - 20)  SpO2: 99% (03 Sep 2017 07:44) (96% - 100%)    I&O's Detail    02 Sep 2017 07:01  -  03 Sep 2017 07:00  --------------------------------------------------------  IN:    lactated ringers.: 2300 mL  Total IN: 2300 mL    OUT:    Voided: 600 mL  Total OUT: 600 mL    Total NET: 1700 mL          Exam:  GEN: A&Ox3, NAD  HEENT: atraumatic, normocephalic  CV: no JVD  RESP: no increased work of breathing, no use of accessory muscles  GI/ABD: S/NT/ND, ostomy is pink w/ some dark stool and gas in bag, well healed prior midline incision  : deferred  EXTREMITIES: warm, pink, well-perfused                          10.2   3.14  )-----------( 177      ( 03 Sep 2017 05:37 )             30.6       09-03    142  |  100  |  11  ----------------------------<  91  3.9   |  32<H>  |  0.48<L>    Ca    8.7      03 Sep 2017 05:30  Phos  2.8     09-03  Mg     1.7     09-03            ASSESSMENT:  62 F PMH rectal cancer s/p radiation, low anterior resection with diverting loop ileostomy who presented with SBO, currently hemodynamically stable    PLAN:  - Monitor ostomy output  - DVT ppx  - Encourage OOB/IS  - Consider advancing to regular diet  - Dispo planning        Rashmi Das MD PGY-1  pager: [phone]

## 2017-09-03 NOTE — DISCHARGE NOTE ADULT - PLAN OF CARE
Tolerating regular diet, no abdominal pain, having good output from ostomy Please follow up with Dr. Moore in 1-2 weeks after discharge

## 2017-09-03 NOTE — DISCHARGE NOTE ADULT - PATIENT PORTAL LINK FT
“You can access the Follow[employer] Patient Portal, offered by [facility], by registering with the following website: [email or web address]”

## 2017-09-03 NOTE — DISCHARGE NOTE ADULT - CONDITIONS AT DISCHARGE
Pt A&O x4. Pt in stable condition at time of discharge. No complaints of shortness of breath, pain, chest pain. Discharge paperwork given and understood.

## 2017-09-05 PROBLEM — C19 MALIGNANT NEOPLASM OF RECTOSIGMOID JUNCTION: Chronic | Status: ACTIVE | Noted: 2017-08-30

## 2017-09-14 ENCOUNTER — APPOINTMENT (OUTPATIENT)
Dept: SURGICAL ONCOLOGY | Facility: CLINIC | Age: 62
End: 2017-09-14
Payer: MEDICAID

## 2017-09-14 VITALS
OXYGEN SATURATION: 98 % | TEMPERATURE: 97.8 F | DIASTOLIC BLOOD PRESSURE: 72 MMHG | BODY MASS INDEX: 17.48 KG/M2 | HEART RATE: 70 BPM | HEIGHT: 62 IN | SYSTOLIC BLOOD PRESSURE: 127 MMHG | WEIGHT: 95 LBS | RESPIRATION RATE: 16 BRPM

## 2017-09-14 PROCEDURE — 99024 POSTOP FOLLOW-UP VISIT: CPT

## 2017-09-14 RX ORDER — MULTIVIT-MIN/FOLIC/VIT K/LYCOP 400-300MCG
50 MCG TABLET ORAL
Refills: 0 | Status: ACTIVE | COMMUNITY

## 2017-09-20 ENCOUNTER — APPOINTMENT (OUTPATIENT)
Dept: SURGICAL ONCOLOGY | Facility: CLINIC | Age: 62
End: 2017-09-20

## 2017-10-26 ENCOUNTER — APPOINTMENT (OUTPATIENT)
Dept: SURGICAL ONCOLOGY | Facility: CLINIC | Age: 62
End: 2017-10-26
Payer: MEDICAID

## 2017-10-26 VITALS
BODY MASS INDEX: 17.66 KG/M2 | HEART RATE: 64 BPM | WEIGHT: 96 LBS | RESPIRATION RATE: 15 BRPM | SYSTOLIC BLOOD PRESSURE: 124 MMHG | TEMPERATURE: 98 F | DIASTOLIC BLOOD PRESSURE: 80 MMHG | OXYGEN SATURATION: 99 % | HEIGHT: 62 IN

## 2017-10-26 PROCEDURE — 99024 POSTOP FOLLOW-UP VISIT: CPT

## 2017-11-04 ENCOUNTER — APPOINTMENT (OUTPATIENT)
Dept: CT IMAGING | Facility: IMAGING CENTER | Age: 62
End: 2017-11-04
Payer: MEDICAID

## 2017-11-04 ENCOUNTER — OUTPATIENT (OUTPATIENT)
Dept: OUTPATIENT SERVICES | Facility: HOSPITAL | Age: 62
LOS: 1 days | End: 2017-11-04
Payer: MEDICAID

## 2017-11-04 DIAGNOSIS — Z90.49 ACQUIRED ABSENCE OF OTHER SPECIFIED PARTS OF DIGESTIVE TRACT: Chronic | ICD-10-CM

## 2017-11-04 DIAGNOSIS — C20 MALIGNANT NEOPLASM OF RECTUM: ICD-10-CM

## 2017-11-04 PROCEDURE — 74177 CT ABD & PELVIS W/CONTRAST: CPT | Mod: 26

## 2017-11-04 PROCEDURE — 82565 ASSAY OF CREATININE: CPT

## 2017-11-04 PROCEDURE — 74177 CT ABD & PELVIS W/CONTRAST: CPT

## 2017-11-17 ENCOUNTER — OUTPATIENT (OUTPATIENT)
Dept: OUTPATIENT SERVICES | Facility: HOSPITAL | Age: 62
LOS: 1 days | End: 2017-11-17
Payer: MEDICAID

## 2017-11-17 VITALS
RESPIRATION RATE: 16 BRPM | TEMPERATURE: 97 F | HEART RATE: 56 BPM | WEIGHT: 95.02 LBS | DIASTOLIC BLOOD PRESSURE: 70 MMHG | SYSTOLIC BLOOD PRESSURE: 130 MMHG | OXYGEN SATURATION: 98 % | HEIGHT: 61.5 IN

## 2017-11-17 DIAGNOSIS — C20 MALIGNANT NEOPLASM OF RECTUM: ICD-10-CM

## 2017-11-17 DIAGNOSIS — Z98.890 OTHER SPECIFIED POSTPROCEDURAL STATES: Chronic | ICD-10-CM

## 2017-11-17 DIAGNOSIS — Z90.49 ACQUIRED ABSENCE OF OTHER SPECIFIED PARTS OF DIGESTIVE TRACT: Chronic | ICD-10-CM

## 2017-11-17 LAB
ALBUMIN SERPL ELPH-MCNC: 4.4 G/DL — SIGNIFICANT CHANGE UP (ref 3.3–5)
ALP SERPL-CCNC: 38 U/L — LOW (ref 40–120)
ALT FLD-CCNC: 31 U/L — SIGNIFICANT CHANGE UP (ref 4–33)
AST SERPL-CCNC: 28 U/L — SIGNIFICANT CHANGE UP (ref 4–32)
BILIRUB SERPL-MCNC: 0.5 MG/DL — SIGNIFICANT CHANGE UP (ref 0.2–1.2)
BLD GP AB SCN SERPL QL: NEGATIVE — SIGNIFICANT CHANGE UP
BUN SERPL-MCNC: 14 MG/DL — SIGNIFICANT CHANGE UP (ref 7–23)
CALCIUM SERPL-MCNC: 9.3 MG/DL — SIGNIFICANT CHANGE UP (ref 8.4–10.5)
CHLORIDE SERPL-SCNC: 104 MMOL/L — SIGNIFICANT CHANGE UP (ref 98–107)
CO2 SERPL-SCNC: 28 MMOL/L — SIGNIFICANT CHANGE UP (ref 22–31)
CREAT SERPL-MCNC: 0.67 MG/DL — SIGNIFICANT CHANGE UP (ref 0.5–1.3)
GLUCOSE SERPL-MCNC: 77 MG/DL — SIGNIFICANT CHANGE UP (ref 70–99)
HBA1C BLD-MCNC: 5.8 % — HIGH (ref 4–5.6)
HCT VFR BLD CALC: 40.7 % — SIGNIFICANT CHANGE UP (ref 34.5–45)
HGB BLD-MCNC: 13.2 G/DL — SIGNIFICANT CHANGE UP (ref 11.5–15.5)
MCHC RBC-ENTMCNC: 31.3 PG — SIGNIFICANT CHANGE UP (ref 27–34)
MCHC RBC-ENTMCNC: 32.4 % — SIGNIFICANT CHANGE UP (ref 32–36)
MCV RBC AUTO: 96.4 FL — SIGNIFICANT CHANGE UP (ref 80–100)
NRBC # FLD: 0 — SIGNIFICANT CHANGE UP
PLATELET # BLD AUTO: 195 K/UL — SIGNIFICANT CHANGE UP (ref 150–400)
PMV BLD: 8.8 FL — SIGNIFICANT CHANGE UP (ref 7–13)
POTASSIUM SERPL-MCNC: 4.5 MMOL/L — SIGNIFICANT CHANGE UP (ref 3.5–5.3)
POTASSIUM SERPL-SCNC: 4.5 MMOL/L — SIGNIFICANT CHANGE UP (ref 3.5–5.3)
PROT SERPL-MCNC: 7.5 G/DL — SIGNIFICANT CHANGE UP (ref 6–8.3)
RBC # BLD: 4.22 M/UL — SIGNIFICANT CHANGE UP (ref 3.8–5.2)
RBC # FLD: 13.1 % — SIGNIFICANT CHANGE UP (ref 10.3–14.5)
RH IG SCN BLD-IMP: POSITIVE — SIGNIFICANT CHANGE UP
SODIUM SERPL-SCNC: 141 MMOL/L — SIGNIFICANT CHANGE UP (ref 135–145)
WBC # BLD: 2.59 K/UL — LOW (ref 3.8–10.5)
WBC # FLD AUTO: 2.59 K/UL — LOW (ref 3.8–10.5)

## 2017-11-17 PROCEDURE — 93010 ELECTROCARDIOGRAM REPORT: CPT

## 2017-11-17 RX ORDER — HYDRALAZINE HCL 50 MG
1 TABLET ORAL
Qty: 0 | Refills: 0 | COMMUNITY

## 2017-11-17 RX ORDER — SODIUM CHLORIDE 9 MG/ML
1000 INJECTION, SOLUTION INTRAVENOUS
Qty: 0 | Refills: 0 | Status: DISCONTINUED | OUTPATIENT
Start: 2017-12-01 | End: 2017-12-03

## 2017-11-17 RX ORDER — CHLORTHALIDONE 50 MG
1 TABLET ORAL
Qty: 0 | Refills: 0 | COMMUNITY

## 2017-11-17 RX ORDER — TIZANIDINE 4 MG/1
2 TABLET ORAL
Qty: 0 | Refills: 0 | COMMUNITY

## 2017-11-17 RX ORDER — GABAPENTIN 400 MG/1
1 CAPSULE ORAL
Qty: 0 | Refills: 0 | COMMUNITY

## 2017-11-17 RX ORDER — CARVEDILOL PHOSPHATE 80 MG/1
1 CAPSULE, EXTENDED RELEASE ORAL
Qty: 0 | Refills: 0 | COMMUNITY

## 2017-11-17 NOTE — H&P PST ADULT - GASTROINTESTINAL DETAILS
bowel sounds normal/no masses palpable/no distention/nontender/no organomegaly/soft/no bruit/no guarding/no rebound tenderness/no rigidity

## 2017-11-17 NOTE — H&P PST ADULT - NEGATIVE SKIN SYMPTOMS
no rash/no change in size/color of mole/no dryness/no itching no itching/no dryness/no brittle nails/no rash/no tumor/no hair loss/no change in size/color of mole/no pitted nails

## 2017-11-17 NOTE — H&P PST ADULT - HISTORY OF PRESENT ILLNESS
63 y/o female with PMH of HTN (not on meds), thyroid disease (unsure of type) and colon ca diagnosed on 3/2017. 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 Pt states presented an episode of melena around 2/2017. Had a colonoscopy on 3/2017 and bx of polyps done revealing malignancy. Pt is S/P chemotherapy and radiation (last on 6/2017) and S/P ileostomy on 8/2017. Denies any complains at this time.

## 2017-11-17 NOTE — H&P PST ADULT - LYMPHATIC
posterior cervical L/posterior cervical R/anterior cervical R/anterior cervical L/supraclavicular L/supraclavicular R

## 2017-11-17 NOTE — H&P PST ADULT - NEGATIVE GASTROINTESTINAL SYMPTOMS
no abdominal pain/no constipation/no nausea/no vomiting/no diarrhea no flatulence/no constipation/no nausea/no change in bowel habits/no vomiting/no diarrhea/no abdominal pain/no melena

## 2017-11-17 NOTE — H&P PST ADULT - RS GEN PE MLT RESP DETAILS PC
clear to auscultation bilaterally/no wheezes/respirations non-labored/no rales/airway patent/breath sounds equal/good air movement/no intercostal retractions/no subcutaneous emphysema/no rhonchi

## 2017-11-17 NOTE — H&P PST ADULT - PSH
No significant past surgical history H/O ileostomy    History of low anterior resection of rectum  Surgeon Dr Moore performed 8/4/17

## 2017-11-17 NOTE — H&P PST ADULT - NEGATIVE RESPIRATORY AND THORAX SYMPTOMS
no wheezing/no cough/no dyspnea no wheezing/no dyspnea/no cough/no hemoptysis/no pleuritic chest pain

## 2017-11-17 NOTE — H&P PST ADULT - MUSCULOSKELETAL
negative detailed exam no joint swelling/no joint erythema/no joint warmth/no calf tenderness/normal strength/ROM intact

## 2017-11-17 NOTE — H&P PST ADULT - NEGATIVE BREAST SYMPTOMS
no breast lump L/no breast lump R/no breast tenderness R/no nipple discharge L/no breast tenderness L no breast tenderness L/no nipple discharge R/no breast lump L/no breast lump R/no nipple discharge L/no breast tenderness R

## 2017-11-17 NOTE — H&P PST ADULT - ANESTHESIA, PREVIOUS REACTION, PROFILE
denies h/o general anesthesia, denies family h/o denies h/o general anesthesia, denies family h/o/none

## 2017-11-17 NOTE — H&P PST ADULT - PROBLEM SELECTOR PLAN 1
Pt is scheduled for closure of loop ileostomy on 12/1/17. Preop instructions provided, verbalized understanding. medical clearance scheduled on 11/20/17, form provided.

## 2017-11-17 NOTE — H&P PST ADULT - NSANTHOSAYNRD_GEN_A_CORE
No. BRISA screening performed.  STOP BANG Legend: 0-2 = LOW Risk; 3-4 = INTERMEDIATE Risk; 5-8 = HIGH Risk No. BRISA screening performed.  STOP BANG Legend: 0-2 = LOW Risk; 3-4 = INTERMEDIATE Risk; 5-8 = HIGH Risk/never tested

## 2017-11-17 NOTE — H&P PST ADULT - NEGATIVE CARDIOVASCULAR SYMPTOMS
no chest pain/no dyspnea on exertion/no palpitations no peripheral edema/no claudication/no chest pain/no orthopnea/no paroxysmal nocturnal dyspnea/no dyspnea on exertion/no palpitations

## 2017-11-18 LAB — SPECIMEN SOURCE: SIGNIFICANT CHANGE UP

## 2017-11-20 LAB — BACTERIA NPH CULT: SIGNIFICANT CHANGE UP

## 2017-12-01 ENCOUNTER — RESULT REVIEW (OUTPATIENT)
Age: 62
End: 2017-12-01

## 2017-12-01 ENCOUNTER — TRANSCRIPTION ENCOUNTER (OUTPATIENT)
Age: 62
End: 2017-12-01

## 2017-12-01 ENCOUNTER — APPOINTMENT (OUTPATIENT)
Dept: SURGICAL ONCOLOGY | Facility: HOSPITAL | Age: 62
End: 2017-12-01
Payer: MEDICAID

## 2017-12-01 ENCOUNTER — INPATIENT (INPATIENT)
Facility: HOSPITAL | Age: 62
LOS: 3 days | Discharge: HOME | End: 2017-12-05
Attending: SPECIALIST | Admitting: SPECIALIST
Payer: MEDICAID

## 2017-12-01 VITALS
HEART RATE: 56 BPM | HEIGHT: 61 IN | DIASTOLIC BLOOD PRESSURE: 73 MMHG | OXYGEN SATURATION: 100 % | RESPIRATION RATE: 16 BRPM | TEMPERATURE: 98 F | WEIGHT: 95.02 LBS | SYSTOLIC BLOOD PRESSURE: 141 MMHG

## 2017-12-01 DIAGNOSIS — Z98.890 OTHER SPECIFIED POSTPROCEDURAL STATES: Chronic | ICD-10-CM

## 2017-12-01 DIAGNOSIS — C20 MALIGNANT NEOPLASM OF RECTUM: ICD-10-CM

## 2017-12-01 DIAGNOSIS — Z90.49 ACQUIRED ABSENCE OF OTHER SPECIFIED PARTS OF DIGESTIVE TRACT: Chronic | ICD-10-CM

## 2017-12-01 LAB — GLUCOSE BLDC GLUCOMTR-MCNC: 74 MG/DL — SIGNIFICANT CHANGE UP (ref 70–99)

## 2017-12-01 PROCEDURE — 44050 REDUCE BOWEL OBSTRUCTION: CPT

## 2017-12-01 PROCEDURE — 44120 REMOVAL OF SMALL INTESTINE: CPT

## 2017-12-01 PROCEDURE — 88307 TISSUE EXAM BY PATHOLOGIST: CPT | Mod: 26

## 2017-12-01 RX ORDER — MORPHINE SULFATE 50 MG/1
2 CAPSULE, EXTENDED RELEASE ORAL EVERY 4 HOURS
Qty: 0 | Refills: 0 | Status: DISCONTINUED | OUTPATIENT
Start: 2017-12-01 | End: 2017-12-03

## 2017-12-01 RX ORDER — ONDANSETRON 8 MG/1
4 TABLET, FILM COATED ORAL ONCE
Qty: 0 | Refills: 0 | Status: DISCONTINUED | OUTPATIENT
Start: 2017-12-01 | End: 2017-12-01

## 2017-12-01 RX ORDER — HYDROMORPHONE HYDROCHLORIDE 2 MG/ML
0.5 INJECTION INTRAMUSCULAR; INTRAVENOUS; SUBCUTANEOUS
Qty: 0 | Refills: 0 | Status: DISCONTINUED | OUTPATIENT
Start: 2017-12-01 | End: 2017-12-01

## 2017-12-01 RX ORDER — HYDROMORPHONE HYDROCHLORIDE 2 MG/ML
0.25 INJECTION INTRAMUSCULAR; INTRAVENOUS; SUBCUTANEOUS
Qty: 0 | Refills: 0 | Status: DISCONTINUED | OUTPATIENT
Start: 2017-12-01 | End: 2017-12-03

## 2017-12-01 RX ORDER — KETOROLAC TROMETHAMINE 30 MG/ML
15 SYRINGE (ML) INJECTION EVERY 6 HOURS
Qty: 0 | Refills: 0 | Status: DISCONTINUED | OUTPATIENT
Start: 2017-12-01 | End: 2017-12-03

## 2017-12-01 RX ORDER — CEFOTETAN DISODIUM 1 G
1 VIAL (EA) INJECTION ONCE
Qty: 0 | Refills: 0 | Status: COMPLETED | OUTPATIENT
Start: 2017-12-01 | End: 2017-12-01

## 2017-12-01 RX ORDER — ACETAMINOPHEN 500 MG
650 TABLET ORAL EVERY 6 HOURS
Qty: 0 | Refills: 0 | Status: DISCONTINUED | OUTPATIENT
Start: 2017-12-01 | End: 2017-12-05

## 2017-12-01 RX ORDER — MORPHINE SULFATE 50 MG/1
4 CAPSULE, EXTENDED RELEASE ORAL EVERY 4 HOURS
Qty: 0 | Refills: 0 | Status: DISCONTINUED | OUTPATIENT
Start: 2017-12-01 | End: 2017-12-03

## 2017-12-01 RX ORDER — ENOXAPARIN SODIUM 100 MG/ML
40 INJECTION SUBCUTANEOUS EVERY 24 HOURS
Qty: 0 | Refills: 0 | Status: DISCONTINUED | OUTPATIENT
Start: 2017-12-01 | End: 2017-12-05

## 2017-12-01 RX ORDER — FENTANYL CITRATE 50 UG/ML
50 INJECTION INTRAVENOUS
Qty: 0 | Refills: 0 | Status: DISCONTINUED | OUTPATIENT
Start: 2017-12-01 | End: 2017-12-01

## 2017-12-01 RX ORDER — HYDROMORPHONE HYDROCHLORIDE 2 MG/ML
0.5 INJECTION INTRAMUSCULAR; INTRAVENOUS; SUBCUTANEOUS EVERY 4 HOURS
Qty: 0 | Refills: 0 | Status: DISCONTINUED | OUTPATIENT
Start: 2017-12-01 | End: 2017-12-03

## 2017-12-01 RX ADMIN — Medication 650 MILLIGRAM(S): at 15:30

## 2017-12-01 RX ADMIN — Medication 120 GRAM(S): at 22:24

## 2017-12-01 RX ADMIN — HYDROMORPHONE HYDROCHLORIDE 0.25 MILLIGRAM(S): 2 INJECTION INTRAMUSCULAR; INTRAVENOUS; SUBCUTANEOUS at 10:23

## 2017-12-01 RX ADMIN — ENOXAPARIN SODIUM 40 MILLIGRAM(S): 100 INJECTION SUBCUTANEOUS at 22:23

## 2017-12-01 RX ADMIN — SODIUM CHLORIDE 75 MILLILITER(S): 9 INJECTION, SOLUTION INTRAVENOUS at 10:53

## 2017-12-01 RX ADMIN — Medication 15 MILLIGRAM(S): at 15:04

## 2017-12-01 RX ADMIN — Medication 650 MILLIGRAM(S): at 15:10

## 2017-12-01 RX ADMIN — HYDROMORPHONE HYDROCHLORIDE 0.25 MILLIGRAM(S): 2 INJECTION INTRAMUSCULAR; INTRAVENOUS; SUBCUTANEOUS at 11:05

## 2017-12-01 RX ADMIN — HYDROMORPHONE HYDROCHLORIDE 0.25 MILLIGRAM(S): 2 INJECTION INTRAMUSCULAR; INTRAVENOUS; SUBCUTANEOUS at 16:50

## 2017-12-01 RX ADMIN — HYDROMORPHONE HYDROCHLORIDE 0.25 MILLIGRAM(S): 2 INJECTION INTRAMUSCULAR; INTRAVENOUS; SUBCUTANEOUS at 13:05

## 2017-12-01 RX ADMIN — Medication 15 MILLIGRAM(S): at 15:30

## 2017-12-01 RX ADMIN — SODIUM CHLORIDE 75 MILLILITER(S): 9 INJECTION, SOLUTION INTRAVENOUS at 18:32

## 2017-12-01 NOTE — BRIEF OPERATIVE NOTE - PROCEDURE
<<-----Click on this checkbox to enter Procedure Closure of loop ileostomy  12/01/2017    Active  [id number]

## 2017-12-01 NOTE — PROGRESS NOTE ADULT - SUBJECTIVE AND OBJECTIVE BOX
D Team Surgery Post Op Note     STATUS POST:  loop ileostomy reversal, POD #0    SUBJECTIVE: Pt seen and examined at beside s/p ileostomy reversal. Patient tolerated procedure well. Only voices complaints that she is "tired". Denies SOB, CP, nausea. Pain exists, but controlled with medication. Baker in place and draining adequately.    Vital Signs Last 24 Hrs  T(C): 36.9 (01 Dec 2017 09:50), Max: 36.9 (01 Dec 2017 09:50)  T(F): 98.4 (01 Dec 2017 09:50), Max: 98.4 (01 Dec 2017 09:50)  HR: 66 (01 Dec 2017 14:30) (53 - 66)  BP: 114/57 (01 Dec 2017 14:30) (114/57 - 176/80)  BP(mean): --  RR: 15 (01 Dec 2017 14:30) (8 - 18)  SpO2: 100% (01 Dec 2017 14:30) (99% - 100%)  Baker:  NGT:  I&O's Summary    01 Dec 2017 07:01  -  01 Dec 2017 15:01  --------------------------------------------------------  IN: 150 mL / OUT: 260 mL / NET: -110 mL      I&O's Detail    01 Dec 2017 07:01  -  01 Dec 2017 15:01  --------------------------------------------------------  IN:    lactated ringers.: 150 mL  Total IN: 150 mL    OUT:    Indwelling Catheter - Urethral: 260 mL  Total OUT: 260 mL    Total NET: -110 mL      PHYSICAL EXAM:  Neurological: AxAxOx3  Constitutional: NAD  Eyes:  PERRL, EOMI  Respiratory:  Lungs CTA, B/L, no rales , no wheezing, no rhonchi.  Cardiovascular:  S1, S2, RRR  Gastrointestinal: Abdomen soft, non distended, Wound: C/D/I  Extremities:  No edema, no calf tenderness

## 2017-12-01 NOTE — PROGRESS NOTE ADULT - ASSESSMENT
Assessment   62F s/p loop ileostomy reversal POD #0. Doing well.     Plan  - pain control  - NPO today, will advance to CLD tomorrow   - Daily dressing changes: chano in place until POD 3  - Baker in place--> monitor I/Os, will d/c tomorrow  - encourage OOB, ambulation  - VTE prophylaxis

## 2017-12-02 LAB
BASOPHILS # BLD AUTO: 0.02 K/UL — SIGNIFICANT CHANGE UP (ref 0–0.2)
BASOPHILS NFR BLD AUTO: 0.4 % — SIGNIFICANT CHANGE UP (ref 0–2)
BUN SERPL-MCNC: 14 MG/DL — SIGNIFICANT CHANGE UP (ref 7–23)
CALCIUM SERPL-MCNC: 8.3 MG/DL — LOW (ref 8.4–10.5)
CHLORIDE SERPL-SCNC: 102 MMOL/L — SIGNIFICANT CHANGE UP (ref 98–107)
CO2 SERPL-SCNC: 25 MMOL/L — SIGNIFICANT CHANGE UP (ref 22–31)
CREAT SERPL-MCNC: 0.62 MG/DL — SIGNIFICANT CHANGE UP (ref 0.5–1.3)
EOSINOPHIL # BLD AUTO: 0 K/UL — SIGNIFICANT CHANGE UP (ref 0–0.5)
EOSINOPHIL NFR BLD AUTO: 0 % — SIGNIFICANT CHANGE UP (ref 0–6)
GLUCOSE SERPL-MCNC: 78 MG/DL — SIGNIFICANT CHANGE UP (ref 70–99)
HCT VFR BLD CALC: 33.5 % — LOW (ref 34.5–45)
HGB BLD-MCNC: 11 G/DL — LOW (ref 11.5–15.5)
IMM GRANULOCYTES # BLD AUTO: 0.01 # — SIGNIFICANT CHANGE UP
IMM GRANULOCYTES NFR BLD AUTO: 0.2 % — SIGNIFICANT CHANGE UP (ref 0–1.5)
LYMPHOCYTES # BLD AUTO: 0.49 K/UL — LOW (ref 1–3.3)
LYMPHOCYTES # BLD AUTO: 10.3 % — LOW (ref 13–44)
MCHC RBC-ENTMCNC: 30.6 PG — SIGNIFICANT CHANGE UP (ref 27–34)
MCHC RBC-ENTMCNC: 32.8 % — SIGNIFICANT CHANGE UP (ref 32–36)
MCV RBC AUTO: 93.1 FL — SIGNIFICANT CHANGE UP (ref 80–100)
MONOCYTES # BLD AUTO: 0.49 K/UL — SIGNIFICANT CHANGE UP (ref 0–0.9)
MONOCYTES NFR BLD AUTO: 10.3 % — SIGNIFICANT CHANGE UP (ref 2–14)
NEUTROPHILS # BLD AUTO: 3.76 K/UL — SIGNIFICANT CHANGE UP (ref 1.8–7.4)
NEUTROPHILS NFR BLD AUTO: 78.8 % — HIGH (ref 43–77)
NRBC # FLD: 0 — SIGNIFICANT CHANGE UP
PLATELET # BLD AUTO: 173 K/UL — SIGNIFICANT CHANGE UP (ref 150–400)
PMV BLD: 8.7 FL — SIGNIFICANT CHANGE UP (ref 7–13)
POTASSIUM SERPL-MCNC: 4.1 MMOL/L — SIGNIFICANT CHANGE UP (ref 3.5–5.3)
POTASSIUM SERPL-SCNC: 4.1 MMOL/L — SIGNIFICANT CHANGE UP (ref 3.5–5.3)
RBC # BLD: 3.6 M/UL — LOW (ref 3.8–5.2)
RBC # FLD: 13.3 % — SIGNIFICANT CHANGE UP (ref 10.3–14.5)
SODIUM SERPL-SCNC: 139 MMOL/L — SIGNIFICANT CHANGE UP (ref 135–145)
WBC # BLD: 4.77 K/UL — SIGNIFICANT CHANGE UP (ref 3.8–10.5)
WBC # FLD AUTO: 4.77 K/UL — SIGNIFICANT CHANGE UP (ref 3.8–10.5)

## 2017-12-02 RX ADMIN — Medication 15 MILLIGRAM(S): at 22:28

## 2017-12-02 RX ADMIN — Medication 15 MILLIGRAM(S): at 15:09

## 2017-12-02 RX ADMIN — Medication 650 MILLIGRAM(S): at 15:09

## 2017-12-02 RX ADMIN — ENOXAPARIN SODIUM 40 MILLIGRAM(S): 100 INJECTION SUBCUTANEOUS at 21:29

## 2017-12-02 RX ADMIN — Medication 15 MILLIGRAM(S): at 21:28

## 2017-12-02 RX ADMIN — Medication 15 MILLIGRAM(S): at 09:09

## 2017-12-02 RX ADMIN — Medication 650 MILLIGRAM(S): at 15:39

## 2017-12-02 RX ADMIN — Medication 650 MILLIGRAM(S): at 09:09

## 2017-12-02 RX ADMIN — Medication 15 MILLIGRAM(S): at 15:39

## 2017-12-02 RX ADMIN — Medication 650 MILLIGRAM(S): at 22:28

## 2017-12-02 RX ADMIN — Medication 650 MILLIGRAM(S): at 21:28

## 2017-12-02 RX ADMIN — Medication 15 MILLIGRAM(S): at 08:20

## 2017-12-02 RX ADMIN — Medication 650 MILLIGRAM(S): at 08:20

## 2017-12-02 NOTE — PROGRESS NOTE ADULT - ATTENDING COMMENTS
Recuperating from closure of loop ileostomy yesterday.    Afebrile, stable vital signs.  Urine output at least 5-20 cc.    Postoperative last pending.    Increase activity and pulmonary toilet as tolerated, await return of GI function Recuperating from closure of loop ileostomy yesterday.    Comfortable    Afebrile, stable vital signs.  Urine output at least 520 cc.    Abd: soft and NT    Postoperative labs pending.    Increase activity and pulmonary toilet as tolerated, await return of GI function

## 2017-12-02 NOTE — PROGRESS NOTE ADULT - SUBJECTIVE AND OBJECTIVE BOX
Surgery Team D (Surgery Oncology) Progress Note:    Post-Operative Day: 1    Subjective: Pt seen this AM. No events overnight. Pain controlled. NPO. Voiding to barakat.          Objective:  T(C): 37.2 (12-02-17 @ 01:02), Max: 37.2 (12-02-17 @ 01:02)  HR: 61 (12-02-17 @ 01:02) (53 - 66)  BP: 103/43 (12-02-17 @ 01:02) (103/43 - 176/80)  RR: 18 (12-02-17 @ 01:02) (8 - 18)  SpO2: 100% (12-02-17 @ 01:02) (99% - 100%)    Labs:            I&O's Detail    01 Dec 2017 07:01  -  02 Dec 2017 03:28  --------------------------------------------------------  IN:    lactated ringers.: 975 mL  Total IN: 975 mL    OUT:    Indwelling Catheter - Urethral: 295 mL  Total OUT: 295 mL    Total NET: 680 mL          Focused Physical Exam:  General: NAD  Neurological: AxAxOx3  Respiratory:  Non labored breathing  Cardiovascular:  S1, S2, RRR  Gastrointestinal: Abdomen soft, non distended, Wound: C/D/I  Extremities:  No edema, no calf tenderness      Medications:  acetaminophen   Tablet. 650 milliGRAM(s) Oral every 6 hours  enoxaparin Injectable 40 milliGRAM(s) SubCutaneous every 24 hours  HYDROmorphone  Injectable 0.25 milliGRAM(s) IV Push every 10 minutes PRN  HYDROmorphone  Injectable 0.5 milliGRAM(s) IV Push every 4 hours PRN  ketorolac   Injectable 15 milliGRAM(s) IV Push every 6 hours  lactated ringers. 1000 milliLiter(s) IV Continuous <Continuous>  morphine  - Injectable 2 milliGRAM(s) IV Push every 4 hours PRN  morphine  - Injectable 4 milliGRAM(s) IV Push every 4 hours PRN

## 2017-12-02 NOTE — PROGRESS NOTE ADULT - ASSESSMENT
62F s/p loop ileostomy reversal POD #1. Doing well:  - Avance to CLD   - pain control  - Daily dressing changes: chano in place until POD 3  - D/C barakat today   - encourage OOB, ambulation  - VTE prophylaxis

## 2017-12-03 LAB
BUN SERPL-MCNC: 10 MG/DL — SIGNIFICANT CHANGE UP (ref 7–23)
CALCIUM SERPL-MCNC: 8.1 MG/DL — LOW (ref 8.4–10.5)
CHLORIDE SERPL-SCNC: 102 MMOL/L — SIGNIFICANT CHANGE UP (ref 98–107)
CO2 SERPL-SCNC: 27 MMOL/L — SIGNIFICANT CHANGE UP (ref 22–31)
CREAT SERPL-MCNC: 0.51 MG/DL — SIGNIFICANT CHANGE UP (ref 0.5–1.3)
GLUCOSE SERPL-MCNC: 83 MG/DL — SIGNIFICANT CHANGE UP (ref 70–99)
HCT VFR BLD CALC: 32.8 % — LOW (ref 34.5–45)
HGB BLD-MCNC: 10.9 G/DL — LOW (ref 11.5–15.5)
MAGNESIUM SERPL-MCNC: 1.8 MG/DL — SIGNIFICANT CHANGE UP (ref 1.6–2.6)
MCHC RBC-ENTMCNC: 31.3 PG — SIGNIFICANT CHANGE UP (ref 27–34)
MCHC RBC-ENTMCNC: 33.2 % — SIGNIFICANT CHANGE UP (ref 32–36)
MCV RBC AUTO: 94.3 FL — SIGNIFICANT CHANGE UP (ref 80–100)
NRBC # FLD: 0 — SIGNIFICANT CHANGE UP
PHOSPHATE SERPL-MCNC: 2.6 MG/DL — SIGNIFICANT CHANGE UP (ref 2.5–4.5)
PLATELET # BLD AUTO: 158 K/UL — SIGNIFICANT CHANGE UP (ref 150–400)
PMV BLD: 8.8 FL — SIGNIFICANT CHANGE UP (ref 7–13)
POTASSIUM SERPL-MCNC: 3.6 MMOL/L — SIGNIFICANT CHANGE UP (ref 3.5–5.3)
POTASSIUM SERPL-SCNC: 3.6 MMOL/L — SIGNIFICANT CHANGE UP (ref 3.5–5.3)
RBC # BLD: 3.48 M/UL — LOW (ref 3.8–5.2)
RBC # FLD: 13.5 % — SIGNIFICANT CHANGE UP (ref 10.3–14.5)
SODIUM SERPL-SCNC: 141 MMOL/L — SIGNIFICANT CHANGE UP (ref 135–145)
WBC # BLD: 4.1 K/UL — SIGNIFICANT CHANGE UP (ref 3.8–10.5)
WBC # FLD AUTO: 4.1 K/UL — SIGNIFICANT CHANGE UP (ref 3.8–10.5)

## 2017-12-03 RX ORDER — OXYCODONE HYDROCHLORIDE 5 MG/1
5 TABLET ORAL EVERY 4 HOURS
Qty: 0 | Refills: 0 | Status: DISCONTINUED | OUTPATIENT
Start: 2017-12-03 | End: 2017-12-05

## 2017-12-03 RX ORDER — OXYCODONE HYDROCHLORIDE 5 MG/1
10 TABLET ORAL EVERY 4 HOURS
Qty: 0 | Refills: 0 | Status: DISCONTINUED | OUTPATIENT
Start: 2017-12-03 | End: 2017-12-05

## 2017-12-03 RX ORDER — HYDROMORPHONE HYDROCHLORIDE 2 MG/ML
0.5 INJECTION INTRAMUSCULAR; INTRAVENOUS; SUBCUTANEOUS EVERY 4 HOURS
Qty: 0 | Refills: 0 | Status: DISCONTINUED | OUTPATIENT
Start: 2017-12-03 | End: 2017-12-04

## 2017-12-03 RX ORDER — POTASSIUM PHOSPHATE, MONOBASIC POTASSIUM PHOSPHATE, DIBASIC 236; 224 MG/ML; MG/ML
15 INJECTION, SOLUTION INTRAVENOUS ONCE
Qty: 0 | Refills: 0 | Status: COMPLETED | OUTPATIENT
Start: 2017-12-03 | End: 2017-12-03

## 2017-12-03 RX ADMIN — Medication 650 MILLIGRAM(S): at 05:48

## 2017-12-03 RX ADMIN — ENOXAPARIN SODIUM 40 MILLIGRAM(S): 100 INJECTION SUBCUTANEOUS at 22:46

## 2017-12-03 RX ADMIN — Medication 15 MILLIGRAM(S): at 08:30

## 2017-12-03 RX ADMIN — Medication 650 MILLIGRAM(S): at 22:10

## 2017-12-03 RX ADMIN — Medication 650 MILLIGRAM(S): at 08:30

## 2017-12-03 RX ADMIN — SODIUM CHLORIDE 75 MILLILITER(S): 9 INJECTION, SOLUTION INTRAVENOUS at 04:54

## 2017-12-03 RX ADMIN — Medication 15 MILLIGRAM(S): at 04:48

## 2017-12-03 RX ADMIN — Medication 650 MILLIGRAM(S): at 04:48

## 2017-12-03 RX ADMIN — Medication 650 MILLIGRAM(S): at 21:15

## 2017-12-03 RX ADMIN — Medication 650 MILLIGRAM(S): at 08:47

## 2017-12-03 RX ADMIN — Medication 15 MILLIGRAM(S): at 05:10

## 2017-12-03 RX ADMIN — Medication 15 MILLIGRAM(S): at 08:47

## 2017-12-03 RX ADMIN — POTASSIUM PHOSPHATE, MONOBASIC POTASSIUM PHOSPHATE, DIBASIC 62.5 MILLIMOLE(S): 236; 224 INJECTION, SOLUTION INTRAVENOUS at 12:53

## 2017-12-03 NOTE — PROGRESS NOTE ADULT - ASSESSMENT
62F s/p loop ileostomy reversal POD #2. Doing well:  - Avance to Reg diet   - Pain control  - Daily dressing changes: chano in place until POD 3  - Encourage OOB, ambulation  - VTE prophylaxis

## 2017-12-03 NOTE — PROGRESS NOTE ADULT - ATTENDING COMMENTS
Recuperating from appendectomy and HIPEC    Afebrile with stable vital signs.  Voided 1000 cc yesterday.    This morning white blood cell count is 4.1.  Hematocrit stable at 33%.  Electrolytes normal.    Advance diet and activity as tolerated Recuperating from appendectomy and HIPEC    She is comfortable, ambulating and tolerating clears po  Had small BM (with a little blood clot) this am, with some flatus    Afebrile with stable vital signs.  Voided 1000 cc yesterday.    This morning white blood cell count is 4.1.  Hematocrit stable at 33%.  Electrolytes normal.    Advance diet and activity as tolerated

## 2017-12-03 NOTE — PROGRESS NOTE ADULT - SUBJECTIVE AND OBJECTIVE BOX
Surgery Team D (Surgery Oncology) Progress Note:    Post-Operative Day: 2    Subjective: Pt seen this AM. Pain controlled. Tolerating PO intake. + flatus/- BMs. Remains afebrile.          Objective:  T(C): 36.8 (12-03-17 @ 08:28), Max: 36.8 (12-03-17 @ 08:28)  HR: 62 (12-03-17 @ 08:28) (54 - 62)  BP: 114/56 (12-03-17 @ 08:28) (105/48 - 122/60)  RR: 18 (12-03-17 @ 08:28) (16 - 18)  SpO2: 97% (12-03-17 @ 08:28) (96% - 98%)    Labs:                      10.9   4.10  )-----------( 158      ( 03 Dec 2017 05:30 )             32.8       12-03    141  |  102  |  10  ----------------------------<  83  3.6   |  27  |  0.51    Ca    8.1<L>      03 Dec 2017 05:30  Phos  2.6     12-03  Mg     1.8     12-03        I&O's Detail    02 Dec 2017 07:01  -  03 Dec 2017 07:00  --------------------------------------------------------  IN:    lactated ringers.: 900 mL  Total IN: 900 mL    OUT:    Indwelling Catheter - Urethral: 300 mL    Voided: 1000 mL  Total OUT: 1300 mL    Total NET: -400 mL          Focused Physical Exam:  General: NAD  Respiratory:  Non labored breathing  Cardiovascular:  S1, S2, RRR  Gastrointestinal: Abdomen soft, non distended, Wound: C/D/I  Extremities:  No edema, no calf tenderness    Medications:  acetaminophen   Tablet. 650 milliGRAM(s) Oral every 6 hours  enoxaparin Injectable 40 milliGRAM(s) SubCutaneous every 24 hours  HYDROmorphone  Injectable 0.25 milliGRAM(s) IV Push every 10 minutes PRN  HYDROmorphone  Injectable 0.5 milliGRAM(s) IV Push every 4 hours PRN  lactated ringers. 1000 milliLiter(s) IV Continuous <Continuous>  morphine  - Injectable 2 milliGRAM(s) IV Push every 4 hours PRN  morphine  - Injectable 4 milliGRAM(s) IV Push every 4 hours PRN

## 2017-12-04 ENCOUNTER — TRANSCRIPTION ENCOUNTER (OUTPATIENT)
Age: 62
End: 2017-12-04

## 2017-12-04 LAB
BUN SERPL-MCNC: 9 MG/DL — SIGNIFICANT CHANGE UP (ref 7–23)
CALCIUM SERPL-MCNC: 8.6 MG/DL — SIGNIFICANT CHANGE UP (ref 8.4–10.5)
CHLORIDE SERPL-SCNC: 100 MMOL/L — SIGNIFICANT CHANGE UP (ref 98–107)
CO2 SERPL-SCNC: 30 MMOL/L — SIGNIFICANT CHANGE UP (ref 22–31)
CREAT SERPL-MCNC: 0.62 MG/DL — SIGNIFICANT CHANGE UP (ref 0.5–1.3)
GLUCOSE SERPL-MCNC: 123 MG/DL — HIGH (ref 70–99)
HCT VFR BLD CALC: 38.8 % — SIGNIFICANT CHANGE UP (ref 34.5–45)
HGB BLD-MCNC: 12.3 G/DL — SIGNIFICANT CHANGE UP (ref 11.5–15.5)
MAGNESIUM SERPL-MCNC: 1.8 MG/DL — SIGNIFICANT CHANGE UP (ref 1.6–2.6)
MCHC RBC-ENTMCNC: 30.4 PG — SIGNIFICANT CHANGE UP (ref 27–34)
MCHC RBC-ENTMCNC: 31.7 % — LOW (ref 32–36)
MCV RBC AUTO: 95.8 FL — SIGNIFICANT CHANGE UP (ref 80–100)
NRBC # FLD: 0 — SIGNIFICANT CHANGE UP
PHOSPHATE SERPL-MCNC: 3 MG/DL — SIGNIFICANT CHANGE UP (ref 2.5–4.5)
PLATELET # BLD AUTO: 194 K/UL — SIGNIFICANT CHANGE UP (ref 150–400)
PMV BLD: 9.1 FL — SIGNIFICANT CHANGE UP (ref 7–13)
POTASSIUM SERPL-MCNC: 3.6 MMOL/L — SIGNIFICANT CHANGE UP (ref 3.5–5.3)
POTASSIUM SERPL-SCNC: 3.6 MMOL/L — SIGNIFICANT CHANGE UP (ref 3.5–5.3)
RBC # BLD: 4.05 M/UL — SIGNIFICANT CHANGE UP (ref 3.8–5.2)
RBC # FLD: 13.5 % — SIGNIFICANT CHANGE UP (ref 10.3–14.5)
SODIUM SERPL-SCNC: 141 MMOL/L — SIGNIFICANT CHANGE UP (ref 135–145)
WBC # BLD: 3.65 K/UL — LOW (ref 3.8–10.5)
WBC # FLD AUTO: 3.65 K/UL — LOW (ref 3.8–10.5)

## 2017-12-04 RX ORDER — MAGNESIUM SULFATE 500 MG/ML
2 VIAL (ML) INJECTION ONCE
Qty: 0 | Refills: 0 | Status: COMPLETED | OUTPATIENT
Start: 2017-12-04 | End: 2017-12-04

## 2017-12-04 RX ORDER — POTASSIUM CHLORIDE 20 MEQ
40 PACKET (EA) ORAL ONCE
Qty: 0 | Refills: 0 | Status: COMPLETED | OUTPATIENT
Start: 2017-12-04 | End: 2017-12-04

## 2017-12-04 RX ORDER — ACETAMINOPHEN 500 MG
2 TABLET ORAL
Qty: 0 | Refills: 0 | COMMUNITY
Start: 2017-12-04

## 2017-12-04 RX ADMIN — Medication 40 MILLIEQUIVALENT(S): at 08:45

## 2017-12-04 RX ADMIN — Medication 650 MILLIGRAM(S): at 22:29

## 2017-12-04 RX ADMIN — Medication 650 MILLIGRAM(S): at 04:47

## 2017-12-04 RX ADMIN — Medication 650 MILLIGRAM(S): at 08:46

## 2017-12-04 RX ADMIN — Medication 650 MILLIGRAM(S): at 15:41

## 2017-12-04 RX ADMIN — ENOXAPARIN SODIUM 40 MILLIGRAM(S): 100 INJECTION SUBCUTANEOUS at 21:56

## 2017-12-04 RX ADMIN — Medication 650 MILLIGRAM(S): at 21:56

## 2017-12-04 RX ADMIN — Medication 50 GRAM(S): at 08:45

## 2017-12-04 RX ADMIN — Medication 650 MILLIGRAM(S): at 14:49

## 2017-12-04 RX ADMIN — Medication 650 MILLIGRAM(S): at 05:40

## 2017-12-04 RX ADMIN — Medication 650 MILLIGRAM(S): at 09:00

## 2017-12-04 NOTE — PROGRESS NOTE ADULT - SUBJECTIVE AND OBJECTIVE BOX
Vital Signs Last 24 Hrs  T(C): 36.7 (04 Dec 2017 07:27), Max: 37.1 (04 Dec 2017 00:00)  T(F): 98 (04 Dec 2017 07:27), Max: 98.7 (04 Dec 2017 00:00)  HR: 62 (04 Dec 2017 07:27) (61 - 70)  BP: 128/68 (04 Dec 2017 07:27) (102/46 - 130/69)  BP(mean): --  RR: 18 (04 Dec 2017 07:27) (16 - 18)  SpO2: 98% (04 Dec 2017 07:27) (97% - 99%)    I&O's Detail    03 Dec 2017 07:01  -  04 Dec 2017 07:00  --------------------------------------------------------  IN:    lactated ringers.: 600 mL  Total IN: 600 mL    OUT:  Total OUT: 0 mL    Total NET: 600 mL                                12.3   3.65  )-----------( 194      ( 04 Dec 2017 05:40 )             38.8       12-04    141  |  100  |  9   ----------------------------<  123<H>  3.6   |  30  |  0.62    Ca    8.6      04 Dec 2017 05:40  Phos  3.0     12-04  Mg     1.8     12-04    Pt. having BM's  Wound chano removed today            PLAN:    Continue regular diet  Possible discharge home tomorrow

## 2017-12-04 NOTE — DISCHARGE NOTE ADULT - CARE PROVIDER_API CALL
Kory Moore (MD), Surgery  [address] [place] [id number]  Phone: [phone]  Fax: [phone]

## 2017-12-04 NOTE — DISCHARGE NOTE ADULT - CARE PLAN
Principal Discharge DX:	H/O ileostomy  Goal:	Resolution of symptoms  Instructions for follow-up, activity and diet:	WOUND CARE:  Please keep incisions clean and dry. Please do not Scrub or rub incisions. Do not use lotion or powder on incisions. Cover area with dry, sterile 4 x 4 gauze and secure with tape. Change daily.   BATHING: Please do not submerge wound underwater. You may shower and/or sponge bathe.  ACTIVITY: No heavy lifting or straining. Otherwise, you may return to your usual level of physical activity. If you are taking narcotic pain medication (such as Percocet) DO NOT drive a car, operate machinery or make important decisions.  DIET: Return to your usual diet.  NOTIFY YOUR SURGEON IF: You have any bleeding that does not stop, any pus draining from your wound(s), any fever (over 100.4 F) or chills, persistent nausea/vomiting, persistent diarrhea, or if your pain is not controlled on your discharge pain medications.  FOLLOW-UP: Please follow up with your primary care physician in one week regarding your hospitalization. Please follow-up with your surgeon, Dr. Moore within 7 days following discharge- please call to schedule an appointment.

## 2017-12-04 NOTE — PROGRESS NOTE ADULT - ASSESSMENT
62F s/p loop ileostomy reversal POD #2. Doing well:  - Reg diet  - Pain control  - Tyree removed  - Encourage OOB, ambulation  - VTE prophylaxis  - Dispo planning

## 2017-12-04 NOTE — DISCHARGE NOTE ADULT - PATIENT PORTAL LINK FT
“You can access the [employer] Patient Portal, offered by [facility], by registering with the following website: [email or web address]”

## 2017-12-04 NOTE — PROGRESS NOTE ADULT - SUBJECTIVE AND OBJECTIVE BOX
Surgery Team D (Surgery Oncology) Progress Note:    Post-Operative Day: 3    Subjective: Pt seen this AM. Pain controlled. Tolerating PO intake. Denies N/V. Continues to have clotted blood pass in BMs; appear dark red on inspection and nothing acute. Remains afebrile.          Objective:  T(C): 36.7 (12-04-17 @ 07:27), Max: 37.1 (12-04-17 @ 00:00)  HR: 62 (12-04-17 @ 07:27) (61 - 70)  BP: 128/68 (12-04-17 @ 07:27) (102/46 - 130/69)  RR: 18 (12-04-17 @ 07:27) (16 - 18)  SpO2: 98% (12-04-17 @ 07:27) (97% - 99%)    Labs:                      12.3   3.65  )-----------( 194      ( 04 Dec 2017 05:40 )             38.8       12-04    141  |  100  |  9   ----------------------------<  123<H>  3.6   |  30  |  0.62    Ca    8.6      04 Dec 2017 05:40  Phos  3.0     12-04  Mg     1.8     12-04        I&O's Detail    03 Dec 2017 07:01  -  04 Dec 2017 07:00  --------------------------------------------------------  IN:    lactated ringers.: 600 mL  Total IN: 600 mL    OUT:  Total OUT: 0 mL    Total NET: 600 mL          Focused Physical Exam:  General: NAD  Respiratory:  Non labored breathing  Cardiovascular:  S1, S2, RRR  Gastrointestinal: Abdomen soft, non distended, Wound: C/D/I  Extremities:  No edema, no calf tenderness    Medications:  acetaminophen   Tablet. 650 milliGRAM(s) Oral every 6 hours  enoxaparin Injectable 40 milliGRAM(s) SubCutaneous every 24 hours  magnesium sulfate  IVPB 2 Gram(s) IV Intermittent once  oxyCODONE    IR 5 milliGRAM(s) Oral every 4 hours PRN  oxyCODONE    IR 10 milliGRAM(s) Oral every 4 hours PRN  potassium chloride    Tablet ER 40 milliEquivalent(s) Oral once

## 2017-12-04 NOTE — DISCHARGE NOTE ADULT - ADDITIONAL INSTRUCTIONS
Please follow-up with your surgeon, Dr. Moore within 7 days following discharge- please call [phone] to schedule an appointment.

## 2017-12-04 NOTE — DISCHARGE NOTE ADULT - HOSPITAL COURSE
61 y/o female with PMH of HTN (not on meds), thyroid disease (unsure of type) and colon ca diagnosed on 3/2017. 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 Pt states presented an episode of melena around 2/2017. Had a colonoscopy on 3/2017 and bx of polyps done revealing malignancy. Pt is S/P chemotherapy and radiation (last on 6/2017) and S/P ileostomy on 8/2017. Denies any complains at this time. 63 y/o female with PMH of HTN (not on meds), thyroid disease (unsure of type) and colon ca diagnosed on 3/2017. Presents to [facility] 12/1 for scheduled surgery. Pt states she presented with an episode of melena around 2/2017. Colonoscopy in 3/2017, bx of polyps revealed malignancy. Pt underwent chemotherapy and radiation (last on 6/2017) and ileostomy on 8/2017.     Patient was admitted to [facility] and taken to the OR 12/1 and underwent closure of loop ileostomy, healthy bowel was encountered, no strictures.     Pt tolerated procedure well, without complication. Pt remained hemodynamically stable in the PACU and transferred to the surgical floor.     12/2: Diet was advanced to clear liquids, barakat catheter discontinued with successful trial of void. Diet was restarted and advanced as tolerated.  12/3: Advanced to regular diet. Positve bowel movement with passage of old blood clot.     Pain control was transitioned from IV to PO pain meds. Pt currently ambulating, voiding, tolerating a regular diet, with pain well controlled on PO pain meds. Patient is hemodynamically stable for discharge home to follow up as an outpatient, instructed to call to schedule appointment.     The patient/family felt comfortable with discharge. The patient was discharged to home. The patient had no other issues. 63 y/o female with PMH of HTN (not on meds), thyroid disease (unsure of type) and colon ca diagnosed on 3/2017. Presents to [facility] 12/1 for scheduled surgery. Pt states she presented with an episode of melena around 2/2017. Colonoscopy in 3/2017, bx of polyps revealed malignancy. Pt underwent chemotherapy and radiation (last on 6/2017) and ileostomy on 8/2017.     Patient was admitted to [facility] and taken to the OR 12/1 and underwent closure of loop ileostomy, healthy bowel was encountered, no strictures.     Pt tolerated procedure well, without complication. Pt remained hemodynamically stable in the PACU and transferred to the surgical floor.     12/2: Diet was advanced to clear liquids, barakat catheter discontinued with successful trial of void. Diet was restarted and advanced as tolerated.  12/3: Advanced to regular diet. Positve bowel movement with passage of old blood clot.     Pain control was transitioned from IV to PO pain meds. Pt currently ambulating, voiding, tolerating a regular diet, with pain well controlled on PO pain meds. Patient is hemodynamically stable for discharge home to follow up as an outpatient, instructed to call to schedule appointment.     The patient/family felt comfortable with discharge. The patient was discharged to home. The patient had no other issues.    istop#: [id number]

## 2017-12-04 NOTE — DISCHARGE NOTE ADULT - INSTRUCTIONS
patient is hemodynamically stable, vitals stable. pt surgical incision c/d/i. tolerating regular diet.

## 2017-12-04 NOTE — DISCHARGE NOTE ADULT - PLAN OF CARE
Resolution of symptoms WOUND CARE:  Please keep incisions clean and dry. Please do not Scrub or rub incisions. Do not use lotion or powder on incisions. Cover area with dry, sterile 4 x 4 gauze and secure with tape. Change daily.   BATHING: Please do not submerge wound underwater. You may shower and/or sponge bathe.  ACTIVITY: No heavy lifting or straining. Otherwise, you may return to your usual level of physical activity. If you are taking narcotic pain medication (such as Percocet) DO NOT drive a car, operate machinery or make important decisions.  DIET: Return to your usual diet.  NOTIFY YOUR SURGEON IF: You have any bleeding that does not stop, any pus draining from your wound(s), any fever (over 100.4 F) or chills, persistent nausea/vomiting, persistent diarrhea, or if your pain is not controlled on your discharge pain medications.  FOLLOW-UP: Please follow up with your primary care physician in one week regarding your hospitalization. Please follow-up with your surgeon, Dr. Moore within 7 days following discharge- please call to schedule an appointment.

## 2017-12-04 NOTE — DISCHARGE NOTE ADULT - MEDICATION SUMMARY - MEDICATIONS TO TAKE
I will START or STAY ON the medications listed below when I get home from the hospital:    acetaminophen 325 mg oral tablet  -- 2 tab(s) by mouth every 6 hours  -- Indication: For pain, as needed    oxyCODONE 5 mg oral tablet  -- 1 tab(s) by mouth every 4 hours, As needed, Moderate Pain (4 - 6) MDD:6  -- Indication: For pain, as needed    Vitamin D3  -- 1 tab(s) by mouth once a day in am  -- Indication: For Vitamin    Vitamin C  -- 1 tab(s) by mouth once a day in pm  -- Indication: For Vitamin

## 2017-12-05 VITALS
SYSTOLIC BLOOD PRESSURE: 122 MMHG | DIASTOLIC BLOOD PRESSURE: 64 MMHG | RESPIRATION RATE: 18 BRPM | HEART RATE: 65 BPM | OXYGEN SATURATION: 98 % | TEMPERATURE: 98 F

## 2017-12-05 RX ORDER — OXYCODONE HYDROCHLORIDE 5 MG/1
1 TABLET ORAL
Qty: 18 | Refills: 0 | OUTPATIENT
Start: 2017-12-05 | End: 2017-12-08

## 2017-12-05 RX ADMIN — OXYCODONE HYDROCHLORIDE 5 MILLIGRAM(S): 5 TABLET ORAL at 01:45

## 2017-12-05 RX ADMIN — Medication 650 MILLIGRAM(S): at 08:18

## 2017-12-05 RX ADMIN — Medication 650 MILLIGRAM(S): at 09:58

## 2017-12-05 RX ADMIN — OXYCODONE HYDROCHLORIDE 5 MILLIGRAM(S): 5 TABLET ORAL at 01:15

## 2017-12-05 NOTE — PROGRESS NOTE ADULT - SUBJECTIVE AND OBJECTIVE BOX
Vital Signs Last 24 Hrs  T(C): 36.6 (05 Dec 2017 05:31), Max: 37 (04 Dec 2017 21:56)  T(F): 97.9 (05 Dec 2017 05:31), Max: 98.6 (04 Dec 2017 21:56)  HR: 60 (05 Dec 2017 05:31) (59 - 79)  BP: 145/76 (05 Dec 2017 05:31) (121/64 - 145/76)  BP(mean): --  RR: 18 (05 Dec 2017 05:31) (16 - 18)  SpO2: 97% (05 Dec 2017 05:31) (97% - 99%)    I&O's Detail    04 Dec 2017 07:01  -  05 Dec 2017 07:00  --------------------------------------------------------  IN:  Total IN: 0 mL    OUT:    Stool: 1 mL    Voided: 850 mL  Total OUT: 851 mL    Total NET: -851 mL                                12.3   3.65  )-----------( 194      ( 04 Dec 2017 05:40 )             38.8       12-04    141  |  100  |  9   ----------------------------<  123<H>  3.6   |  30  |  0.62    Ca    8.6      04 Dec 2017 05:40  Phos  3.0     12-04  Mg     1.8     12-04    ostomy site clean  Tolerating diet            PLAN:    Discharge home today  Instructions given

## 2017-12-05 NOTE — PROGRESS NOTE ADULT - SUBJECTIVE AND OBJECTIVE BOX
Surgery Team D (Surgery Oncology) Progress Note:    Post-Operative Day: 4    Subjective: Pt seen this AM. Pain controlled. No acute events overnight. Denies N/V. Remains afebrile.          Objective:  T(C): 36.7 (12-05-17 @ 01:12), Max: 37 (12-04-17 @ 21:56)  HR: 59 (12-05-17 @ 01:12) (59 - 79)  BP: 143/65 (12-05-17 @ 01:12) (121/64 - 143/67)  RR: 18 (12-05-17 @ 01:12) (16 - 18)  SpO2: 98% (12-05-17 @ 01:12) (98% - 99%)    Labs:                      12.3   3.65  )-----------( 194      ( 04 Dec 2017 05:40 )             38.8       12-04    141  |  100  |  9   ----------------------------<  123<H>  3.6   |  30  |  0.62    Ca    8.6      04 Dec 2017 05:40  Phos  3.0     12-04  Mg     1.8     12-04        I&O's Detail    03 Dec 2017 07:01  -  04 Dec 2017 07:00  --------------------------------------------------------  IN:    lactated ringers.: 600 mL  Total IN: 600 mL    OUT:  Total OUT: 0 mL    Total NET: 600 mL      04 Dec 2017 07:01  -  05 Dec 2017 03:15  --------------------------------------------------------  IN:  Total IN: 0 mL    OUT:    Stool: 1 mL    Voided: 750 mL  Total OUT: 751 mL    Total NET: -751 mL          Focused Physical Exam:  General: NAD  Respiratory:  Non labored breathing  Cardiovascular:  S1, S2, RRR  Gastrointestinal: Abdomen soft, non distended, Wound: C/D/I  Extremities:  FROM x 4      Medications:  acetaminophen   Tablet. 650 milliGRAM(s) Oral every 6 hours  enoxaparin Injectable 40 milliGRAM(s) SubCutaneous every 24 hours  oxyCODONE    IR 5 milliGRAM(s) Oral every 4 hours PRN  oxyCODONE    IR 10 milliGRAM(s) Oral every 4 hours PRN

## 2017-12-08 LAB — SURGICAL PATHOLOGY STUDY: SIGNIFICANT CHANGE UP

## 2017-12-11 ENCOUNTER — APPOINTMENT (OUTPATIENT)
Dept: SURGICAL ONCOLOGY | Facility: CLINIC | Age: 62
End: 2017-12-11
Payer: MEDICAID

## 2017-12-11 VITALS
HEART RATE: 66 BPM | OXYGEN SATURATION: 100 % | RESPIRATION RATE: 14 BRPM | BODY MASS INDEX: 17.89 KG/M2 | TEMPERATURE: 97.4 F | DIASTOLIC BLOOD PRESSURE: 64 MMHG | SYSTOLIC BLOOD PRESSURE: 121 MMHG | HEIGHT: 61.5 IN | WEIGHT: 96 LBS

## 2017-12-11 PROCEDURE — 99024 POSTOP FOLLOW-UP VISIT: CPT

## 2017-12-14 ENCOUNTER — OUTPATIENT (OUTPATIENT)
Dept: OUTPATIENT SERVICES | Facility: HOSPITAL | Age: 62
LOS: 1 days | Discharge: HOME | End: 2017-12-14

## 2017-12-14 DIAGNOSIS — C20 MALIGNANT NEOPLASM OF RECTUM: ICD-10-CM

## 2017-12-14 DIAGNOSIS — Z90.49 ACQUIRED ABSENCE OF OTHER SPECIFIED PARTS OF DIGESTIVE TRACT: Chronic | ICD-10-CM

## 2017-12-14 DIAGNOSIS — Z98.890 OTHER SPECIFIED POSTPROCEDURAL STATES: Chronic | ICD-10-CM

## 2017-12-20 ENCOUNTER — APPOINTMENT (OUTPATIENT)
Dept: HEMATOLOGY ONCOLOGY | Facility: CLINIC | Age: 62
End: 2017-12-20
Payer: MEDICAID

## 2017-12-20 ENCOUNTER — RESULT REVIEW (OUTPATIENT)
Age: 62
End: 2017-12-20

## 2017-12-20 VITALS
OXYGEN SATURATION: 98 % | SYSTOLIC BLOOD PRESSURE: 131 MMHG | HEART RATE: 74 BPM | DIASTOLIC BLOOD PRESSURE: 63 MMHG | BODY MASS INDEX: 17.91 KG/M2 | TEMPERATURE: 98.1 F | RESPIRATION RATE: 14 BRPM | WEIGHT: 96.34 LBS

## 2017-12-20 LAB
BASOPHILS # BLD AUTO: 0 K/UL — SIGNIFICANT CHANGE UP (ref 0–0.2)
BASOPHILS NFR BLD AUTO: 0.2 % — SIGNIFICANT CHANGE UP (ref 0–2)
EOSINOPHIL # BLD AUTO: 0 K/UL — SIGNIFICANT CHANGE UP (ref 0–0.5)
EOSINOPHIL NFR BLD AUTO: 0.9 % — SIGNIFICANT CHANGE UP (ref 0–6)
HCT VFR BLD CALC: 36.7 % — SIGNIFICANT CHANGE UP (ref 34.5–45)
HGB BLD-MCNC: 12.1 G/DL — SIGNIFICANT CHANGE UP (ref 11.5–15.5)
LYMPHOCYTES # BLD AUTO: 0.5 K/UL — LOW (ref 1–3.3)
LYMPHOCYTES # BLD AUTO: 14.2 % — SIGNIFICANT CHANGE UP (ref 13–44)
MCHC RBC-ENTMCNC: 31.5 PG — SIGNIFICANT CHANGE UP (ref 27–34)
MCHC RBC-ENTMCNC: 32.9 G/DL — SIGNIFICANT CHANGE UP (ref 32–36)
MCV RBC AUTO: 95.7 FL — SIGNIFICANT CHANGE UP (ref 80–100)
MONOCYTES # BLD AUTO: 0.3 K/UL — SIGNIFICANT CHANGE UP (ref 0–0.9)
MONOCYTES NFR BLD AUTO: 8.4 % — SIGNIFICANT CHANGE UP (ref 2–14)
NEUTROPHILS # BLD AUTO: 2.5 K/UL — SIGNIFICANT CHANGE UP (ref 1.8–7.4)
NEUTROPHILS NFR BLD AUTO: 76.3 % — SIGNIFICANT CHANGE UP (ref 43–77)
PLATELET # BLD AUTO: 448 K/UL — HIGH (ref 150–400)
RBC # BLD: 3.83 M/UL — SIGNIFICANT CHANGE UP (ref 3.8–5.2)
RBC # FLD: 12.2 % — SIGNIFICANT CHANGE UP (ref 10.3–14.5)
WBC # BLD: 3.3 K/UL — LOW (ref 3.8–10.5)
WBC # FLD AUTO: 3.3 K/UL — LOW (ref 3.8–10.5)

## 2017-12-20 PROCEDURE — 99215 OFFICE O/P EST HI 40 MIN: CPT

## 2017-12-21 ENCOUNTER — INPATIENT (INPATIENT)
Facility: HOSPITAL | Age: 62
LOS: 2 days | Discharge: HOME | End: 2017-12-24
Attending: SPECIALIST | Admitting: SPECIALIST
Payer: MEDICAID

## 2017-12-21 VITALS
OXYGEN SATURATION: 99 % | DIASTOLIC BLOOD PRESSURE: 87 MMHG | HEART RATE: 77 BPM | SYSTOLIC BLOOD PRESSURE: 121 MMHG | TEMPERATURE: 98 F | RESPIRATION RATE: 15 BRPM

## 2017-12-21 DIAGNOSIS — Z98.890 OTHER SPECIFIED POSTPROCEDURAL STATES: Chronic | ICD-10-CM

## 2017-12-21 DIAGNOSIS — Z90.49 ACQUIRED ABSENCE OF OTHER SPECIFIED PARTS OF DIGESTIVE TRACT: Chronic | ICD-10-CM

## 2017-12-21 LAB
25(OH)D3 SERPL-MCNC: 38.1 NG/ML
ALBUMIN SERPL ELPH-MCNC: 4.1 G/DL — SIGNIFICANT CHANGE UP (ref 3.3–5)
ALBUMIN SERPL ELPH-MCNC: 4.4 G/DL
ALP BLD-CCNC: 41 U/L
ALP SERPL-CCNC: 38 U/L — LOW (ref 40–120)
ALT FLD-CCNC: 19 U/L — SIGNIFICANT CHANGE UP (ref 4–33)
ALT SERPL-CCNC: 16 U/L
ANION GAP SERPL CALC-SCNC: 11 MMOL/L
AST SERPL-CCNC: 21 U/L
AST SERPL-CCNC: 23 U/L — SIGNIFICANT CHANGE UP (ref 4–32)
BASE EXCESS BLDV CALC-SCNC: 10.5 MMOL/L — SIGNIFICANT CHANGE UP
BASOPHILS # BLD AUTO: 0.02 K/UL — SIGNIFICANT CHANGE UP (ref 0–0.2)
BASOPHILS NFR BLD AUTO: 0.2 % — SIGNIFICANT CHANGE UP (ref 0–2)
BILIRUB SERPL-MCNC: 0.3 MG/DL
BILIRUB SERPL-MCNC: 0.4 MG/DL — SIGNIFICANT CHANGE UP (ref 0.2–1.2)
BLOOD GAS VENOUS - CREATININE: 0.64 MG/DL — SIGNIFICANT CHANGE UP (ref 0.5–1.3)
BUN SERPL-MCNC: 15 MG/DL
BUN SERPL-MCNC: 15 MG/DL — SIGNIFICANT CHANGE UP (ref 7–23)
CALCIUM SERPL-MCNC: 9.4 MG/DL — SIGNIFICANT CHANGE UP (ref 8.4–10.5)
CALCIUM SERPL-MCNC: 9.8 MG/DL
CEA SERPL-MCNC: 1.2 NG/ML
CHLORIDE BLDV-SCNC: 100 MMOL/L — SIGNIFICANT CHANGE UP (ref 96–108)
CHLORIDE SERPL-SCNC: 97 MMOL/L — LOW (ref 98–107)
CHLORIDE SERPL-SCNC: 99 MMOL/L
CO2 SERPL-SCNC: 31 MMOL/L — SIGNIFICANT CHANGE UP (ref 22–31)
CO2 SERPL-SCNC: 33 MMOL/L
CREAT SERPL-MCNC: 0.66 MG/DL — SIGNIFICANT CHANGE UP (ref 0.5–1.3)
CREAT SERPL-MCNC: 0.97 MG/DL
EOSINOPHIL # BLD AUTO: 0.01 K/UL — SIGNIFICANT CHANGE UP (ref 0–0.5)
EOSINOPHIL NFR BLD AUTO: 0.1 % — SIGNIFICANT CHANGE UP (ref 0–6)
FERRITIN SERPL-MCNC: 124 NG/ML
GAS PNL BLDV: 135 MMOL/L — LOW (ref 136–146)
GLUCOSE BLDV-MCNC: 141 — HIGH (ref 70–99)
GLUCOSE SERPL-MCNC: 107 MG/DL
GLUCOSE SERPL-MCNC: 135 MG/DL — HIGH (ref 70–99)
HCO3 BLDV-SCNC: 32 MMOL/L — HIGH (ref 20–27)
HCT VFR BLD CALC: 40.4 % — SIGNIFICANT CHANGE UP (ref 34.5–45)
HCT VFR BLDV CALC: 41.9 % — SIGNIFICANT CHANGE UP (ref 34.5–45)
HGB BLD-MCNC: 13.1 G/DL — SIGNIFICANT CHANGE UP (ref 11.5–15.5)
HGB BLDV-MCNC: 13.6 G/DL — SIGNIFICANT CHANGE UP (ref 11.5–15.5)
IMM GRANULOCYTES # BLD AUTO: 0.03 # — SIGNIFICANT CHANGE UP
IMM GRANULOCYTES NFR BLD AUTO: 0.3 % — SIGNIFICANT CHANGE UP (ref 0–1.5)
LACTATE BLDV-MCNC: 1.1 MMOL/L — SIGNIFICANT CHANGE UP (ref 0.5–2)
LYMPHOCYTES # BLD AUTO: 0.52 K/UL — LOW (ref 1–3.3)
LYMPHOCYTES # BLD AUTO: 6 % — LOW (ref 13–44)
MCHC RBC-ENTMCNC: 30.1 PG — SIGNIFICANT CHANGE UP (ref 27–34)
MCHC RBC-ENTMCNC: 32.4 % — SIGNIFICANT CHANGE UP (ref 32–36)
MCV RBC AUTO: 92.9 FL — SIGNIFICANT CHANGE UP (ref 80–100)
MONOCYTES # BLD AUTO: 0.4 K/UL — SIGNIFICANT CHANGE UP (ref 0–0.9)
MONOCYTES NFR BLD AUTO: 4.6 % — SIGNIFICANT CHANGE UP (ref 2–14)
NEUTROPHILS # BLD AUTO: 7.69 K/UL — HIGH (ref 1.8–7.4)
NEUTROPHILS NFR BLD AUTO: 88.8 % — HIGH (ref 43–77)
NRBC # FLD: 0 — SIGNIFICANT CHANGE UP
PCO2 BLDV: 55 MMHG — HIGH (ref 41–51)
PH BLDV: 7.42 PH — SIGNIFICANT CHANGE UP (ref 7.32–7.43)
PLATELET # BLD AUTO: 381 K/UL — SIGNIFICANT CHANGE UP (ref 150–400)
PMV BLD: 8 FL — SIGNIFICANT CHANGE UP (ref 7–13)
PO2 BLDV: 33 MMHG — LOW (ref 35–40)
POTASSIUM BLDV-SCNC: 3.3 MMOL/L — LOW (ref 3.4–4.5)
POTASSIUM SERPL-MCNC: 3.6 MMOL/L — SIGNIFICANT CHANGE UP (ref 3.5–5.3)
POTASSIUM SERPL-SCNC: 3.6 MMOL/L — SIGNIFICANT CHANGE UP (ref 3.5–5.3)
POTASSIUM SERPL-SCNC: 4.4 MMOL/L
PROT SERPL-MCNC: 7.4 G/DL — SIGNIFICANT CHANGE UP (ref 6–8.3)
PROT SERPL-MCNC: 7.5 G/DL
RBC # BLD: 4.35 M/UL — SIGNIFICANT CHANGE UP (ref 3.8–5.2)
RBC # FLD: 13.1 % — SIGNIFICANT CHANGE UP (ref 10.3–14.5)
SAO2 % BLDV: 57 % — LOW (ref 60–85)
SODIUM SERPL-SCNC: 141 MMOL/L — SIGNIFICANT CHANGE UP (ref 135–145)
SODIUM SERPL-SCNC: 143 MMOL/L
WBC # BLD: 8.67 K/UL — SIGNIFICANT CHANGE UP (ref 3.8–10.5)
WBC # FLD AUTO: 8.67 K/UL — SIGNIFICANT CHANGE UP (ref 3.8–10.5)

## 2017-12-21 RX ORDER — MORPHINE SULFATE 50 MG/1
4 CAPSULE, EXTENDED RELEASE ORAL ONCE
Qty: 0 | Refills: 0 | Status: DISCONTINUED | OUTPATIENT
Start: 2017-12-21 | End: 2017-12-21

## 2017-12-21 RX ORDER — ACETAMINOPHEN 500 MG
1000 TABLET ORAL ONCE
Qty: 0 | Refills: 0 | Status: COMPLETED | OUTPATIENT
Start: 2017-12-21 | End: 2017-12-21

## 2017-12-21 RX ORDER — SODIUM CHLORIDE 9 MG/ML
1000 INJECTION INTRAMUSCULAR; INTRAVENOUS; SUBCUTANEOUS ONCE
Qty: 0 | Refills: 0 | Status: COMPLETED | OUTPATIENT
Start: 2017-12-21 | End: 2017-12-21

## 2017-12-21 RX ORDER — ONDANSETRON 8 MG/1
4 TABLET, FILM COATED ORAL ONCE
Qty: 0 | Refills: 0 | Status: COMPLETED | OUTPATIENT
Start: 2017-12-21 | End: 2017-12-21

## 2017-12-21 RX ADMIN — Medication 400 MILLIGRAM(S): at 23:06

## 2017-12-21 RX ADMIN — ONDANSETRON 4 MILLIGRAM(S): 8 TABLET, FILM COATED ORAL at 23:06

## 2017-12-21 RX ADMIN — SODIUM CHLORIDE 2000 MILLILITER(S): 9 INJECTION INTRAMUSCULAR; INTRAVENOUS; SUBCUTANEOUS at 23:06

## 2017-12-21 NOTE — ED ADULT NURSE NOTE - CHIEF COMPLAINT QUOTE
Pt c/o severe mid-abdominal pain.  Pt had surgery on 12/1 to have ostomy closed.  Original surgery was to have colon cancer removed.  Pt very uncomfortable in triage.

## 2017-12-21 NOTE — ED PROVIDER NOTE - ATTENDING CONTRIBUTION TO CARE
63 y/o f presents with abdominal pain. Started earlier today, constant, middle of abdomen, progressively more severe, had nl bm yesterday, none today, still passing some gas, h/o ileostomy in june s/p reversal 3 weeks ago by dr. blanco. No associated fevers, chills, ha, cp, sob, vomiting, dysuria.  exam  GEN -  appears uncomfortable 2/2 pain; A+O x3   HEAD - NC/AT   EYES- PERRL, EOMI  ENT: Airway patent, mmm, Oral cavity and pharynx normal. No inflammation, swelling, exudate, or lesions.  NECK: Neck supple, non-tender without lymphadenopathy, no masses.  PULMONARY - CTA b/l, symmetric breath sounds.   CARDIAC -s1s2, RRR, no M,G,R  ABDOMEN - +BS, ND, +periumbilical ttp, no guarding, soft, well healed incision site.  BACK - no CVA tenderness, Normal  spine   EXTREMITIES - FROM, symmetric pulses, capillary refill < 2 seconds, no edema   SKIN - no rash or bruising   NEUROLOGIC - alert, speech clear, no focal deficits  PSYCH -nl mood/affect, nl insight.  a/p-62f with abd pain/ttp, no bm today, h/o ileostomy reversal 3 weeks ago, vss, will check labs, ua, ct, pain ctrl, surg eval, monitor, reass.

## 2017-12-21 NOTE — ED PROVIDER NOTE - MEDICAL DECISION MAKING DETAILS
62yof s/p ileostomy reversal w/ abdominal pain, decreased flatus, concern for bowel obstruction. Non-toxic appearing, hemodynamically stable, no active vomiting. Will get CT A/P to evaluate. Basic labs, lactate, pain control, antiemetics prn. Gen Surg aware.

## 2017-12-21 NOTE — ED PROVIDER NOTE - GASTROINTESTINAL, MLM
Abdomen soft, TTP periumbilical, surgical staples in place, incision clean/dry/intact with no warmth or fluctuance

## 2017-12-21 NOTE — ED ADULT NURSE NOTE - OBJECTIVE STATEMENT
covering RN: pt AO x3, ambulatory, c/o severe mid-abdominal pain.  Pt had surgery on 12/1 to have ostomy closed and discharged 12/5.  Original surgery was to have colon cancer removed.   Denies chest pain, dizziness, SOB and n/v at this time. didn't have any signs of infection on her suture side. Evaluated by provider. Blood obtained and sent to LAB. IV inserted. Right AC 20G. Due medication given as ordered. Will continue to monitor.

## 2017-12-21 NOTE — ED PROVIDER NOTE - OBJECTIVE STATEMENT
62yof w/ hx of colon CA, s/p ileostomy reversal 12/1 p/w abdominal pain. Gradual onset this afternoon, du 62yof w/ hx of colon CA, s/p ileostomy reversal 12/1 p/w abdominal pain. Gradual onset this afternoon, diffuse sharp pain, associated with abdominal distension and decreased flatus. No BM today. No fevers, chills or other infectious symptoms. Endorses mild nausea but no vomiting.

## 2017-12-22 DIAGNOSIS — K56.609 UNSPECIFIED INTESTINAL OBSTRUCTION, UNSPECIFIED AS TO PARTIAL VERSUS COMPLETE OBSTRUCTION: ICD-10-CM

## 2017-12-22 LAB
ANISOCYTOSIS BLD QL: SLIGHT — SIGNIFICANT CHANGE UP
BASOPHILS # BLD AUTO: 0.02 K/UL — SIGNIFICANT CHANGE UP (ref 0–0.2)
BASOPHILS NFR BLD AUTO: 0.3 % — SIGNIFICANT CHANGE UP (ref 0–2)
BASOPHILS NFR SPEC: 0 % — SIGNIFICANT CHANGE UP (ref 0–2)
BUN SERPL-MCNC: 12 MG/DL — SIGNIFICANT CHANGE UP (ref 7–23)
CALCIUM SERPL-MCNC: 8.8 MG/DL — SIGNIFICANT CHANGE UP (ref 8.4–10.5)
CHLORIDE SERPL-SCNC: 104 MMOL/L — SIGNIFICANT CHANGE UP (ref 98–107)
CO2 SERPL-SCNC: 31 MMOL/L — SIGNIFICANT CHANGE UP (ref 22–31)
CREAT SERPL-MCNC: 0.6 MG/DL — SIGNIFICANT CHANGE UP (ref 0.5–1.3)
EOSINOPHIL # BLD AUTO: 0.02 K/UL — SIGNIFICANT CHANGE UP (ref 0–0.5)
EOSINOPHIL NFR BLD AUTO: 0.3 % — SIGNIFICANT CHANGE UP (ref 0–6)
EOSINOPHIL NFR FLD: 0 % — SIGNIFICANT CHANGE UP (ref 0–6)
GIANT PLATELETS BLD QL SMEAR: PRESENT — SIGNIFICANT CHANGE UP
GLUCOSE SERPL-MCNC: 113 MG/DL — HIGH (ref 70–99)
HCT VFR BLD CALC: 36.9 % — SIGNIFICANT CHANGE UP (ref 34.5–45)
HGB BLD-MCNC: 12 G/DL — SIGNIFICANT CHANGE UP (ref 11.5–15.5)
IMM GRANULOCYTES # BLD AUTO: 0.02 # — SIGNIFICANT CHANGE UP
IMM GRANULOCYTES NFR BLD AUTO: 0.3 % — SIGNIFICANT CHANGE UP (ref 0–1.5)
LYMPHOCYTES # BLD AUTO: 0.45 K/UL — LOW (ref 1–3.3)
LYMPHOCYTES # BLD AUTO: 7.5 % — LOW (ref 13–44)
LYMPHOCYTES NFR SPEC AUTO: 2.6 % — LOW (ref 13–44)
MACROCYTES BLD QL: SLIGHT — SIGNIFICANT CHANGE UP
MCHC RBC-ENTMCNC: 30.2 PG — SIGNIFICANT CHANGE UP (ref 27–34)
MCHC RBC-ENTMCNC: 32.5 % — SIGNIFICANT CHANGE UP (ref 32–36)
MCV RBC AUTO: 92.9 FL — SIGNIFICANT CHANGE UP (ref 80–100)
MONOCYTES # BLD AUTO: 0.49 K/UL — SIGNIFICANT CHANGE UP (ref 0–0.9)
MONOCYTES NFR BLD AUTO: 8.1 % — SIGNIFICANT CHANGE UP (ref 2–14)
MONOCYTES NFR BLD: 3.5 % — SIGNIFICANT CHANGE UP (ref 2–9)
NEUTROPHIL AB SER-ACNC: 90.3 % — HIGH (ref 43–77)
NEUTROPHILS # BLD AUTO: 5.02 K/UL — SIGNIFICANT CHANGE UP (ref 1.8–7.4)
NEUTROPHILS NFR BLD AUTO: 83.5 % — HIGH (ref 43–77)
NEUTS BAND # BLD: 0.9 % — SIGNIFICANT CHANGE UP (ref 0–6)
NRBC # FLD: 0 — SIGNIFICANT CHANGE UP
OVALOCYTES BLD QL SMEAR: SLIGHT — SIGNIFICANT CHANGE UP
PLATELET # BLD AUTO: 353 K/UL — SIGNIFICANT CHANGE UP (ref 150–400)
PLATELET COUNT - ESTIMATE: NORMAL — SIGNIFICANT CHANGE UP
PMV BLD: 8.3 FL — SIGNIFICANT CHANGE UP (ref 7–13)
POIKILOCYTOSIS BLD QL AUTO: SLIGHT — SIGNIFICANT CHANGE UP
POTASSIUM SERPL-MCNC: 3.8 MMOL/L — SIGNIFICANT CHANGE UP (ref 3.5–5.3)
POTASSIUM SERPL-SCNC: 3.8 MMOL/L — SIGNIFICANT CHANGE UP (ref 3.5–5.3)
RBC # BLD: 3.97 M/UL — SIGNIFICANT CHANGE UP (ref 3.8–5.2)
RBC # FLD: 13.2 % — SIGNIFICANT CHANGE UP (ref 10.3–14.5)
REVIEW TO FOLLOW: YES — SIGNIFICANT CHANGE UP
SODIUM SERPL-SCNC: 145 MMOL/L — SIGNIFICANT CHANGE UP (ref 135–145)
VARIANT LYMPHS # BLD: 2.7 % — SIGNIFICANT CHANGE UP
WBC # BLD: 6.02 K/UL — SIGNIFICANT CHANGE UP (ref 3.8–10.5)
WBC # FLD AUTO: 6.02 K/UL — SIGNIFICANT CHANGE UP (ref 3.8–10.5)

## 2017-12-22 PROCEDURE — 74177 CT ABD & PELVIS W/CONTRAST: CPT | Mod: 26

## 2017-12-22 PROCEDURE — 74000: CPT | Mod: 26

## 2017-12-22 RX ORDER — POTASSIUM CHLORIDE 20 MEQ
10 PACKET (EA) ORAL
Qty: 0 | Refills: 0 | Status: COMPLETED | OUTPATIENT
Start: 2017-12-22 | End: 2017-12-22

## 2017-12-22 RX ORDER — SODIUM CHLORIDE 9 MG/ML
1000 INJECTION, SOLUTION INTRAVENOUS
Qty: 0 | Refills: 0 | Status: DISCONTINUED | OUTPATIENT
Start: 2017-12-22 | End: 2017-12-24

## 2017-12-22 RX ORDER — ENOXAPARIN SODIUM 100 MG/ML
40 INJECTION SUBCUTANEOUS EVERY 24 HOURS
Qty: 0 | Refills: 0 | Status: DISCONTINUED | OUTPATIENT
Start: 2017-12-22 | End: 2017-12-24

## 2017-12-22 RX ADMIN — ENOXAPARIN SODIUM 40 MILLIGRAM(S): 100 INJECTION SUBCUTANEOUS at 05:31

## 2017-12-22 RX ADMIN — Medication 100 MILLIEQUIVALENT(S): at 12:52

## 2017-12-22 RX ADMIN — Medication 100 MILLIEQUIVALENT(S): at 14:00

## 2017-12-22 RX ADMIN — SODIUM CHLORIDE 50 MILLILITER(S): 9 INJECTION, SOLUTION INTRAVENOUS at 20:05

## 2017-12-22 RX ADMIN — SODIUM CHLORIDE 100 MILLILITER(S): 9 INJECTION, SOLUTION INTRAVENOUS at 03:00

## 2017-12-22 NOTE — PROGRESS NOTE ADULT - SUBJECTIVE AND OBJECTIVE BOX
Team D SURGERY PROGRESS NOTE    SUBJECTIVE:  Pt seen at examined at bedside. AVSS.   Pain is controlled.  Staples of R abdominal incision removed at bedside with ease.   Denies flatus or BM    Vital Signs Last 24 Hrs  T(C): 36.9 (22 Dec 2017 08:02), Max: 37.1 (21 Dec 2017 22:47)  T(F): 98.5 (22 Dec 2017 08:02), Max: 98.8 (21 Dec 2017 22:47)  HR: 67 (22 Dec 2017 08:02) (65 - 77)  BP: 125/68 (22 Dec 2017 08:02) (121/87 - 150/72)  BP(mean): --  RR: 18 (22 Dec 2017 08:02) (15 - 18)  SpO2: 100% (22 Dec 2017 08:02) (98% - 100%)    Physical Exam  General: awake, alert,    Pulm: respirations unlabored, no increased WOB  Abdomen: soft, mildly distended, NT, incision c/d/i  Extremities: Grossly symmetric    I&O's Summary    21 Dec 2017 07:01  -  22 Dec 2017 07:00  --------------------------------------------------------  IN: 500 mL / OUT: 100 mL / NET: 400 mL      I&O's Detail    21 Dec 2017 07:01  -  22 Dec 2017 07:00  --------------------------------------------------------  IN:    lactated ringers.: 500 mL  Total IN: 500 mL    OUT:    Voided: 100 mL  Total OUT: 100 mL    Total NET: 400 mL          MEDICATIONS  (STANDING):  enoxaparin Injectable 40 milliGRAM(s) SubCutaneous every 24 hours  lactated ringers. 1000 milliLiter(s) (100 mL/Hr) IV Continuous <Continuous>    MEDICATIONS  (PRN):      LABS:                        12.0   6.02  )-----------( 353      ( 22 Dec 2017 05:49 )             36.9     12-22    145  |  104  |  12  ----------------------------<  113<H>  3.8   |  31  |  0.60    Ca    8.8      22 Dec 2017 05:49    TPro  7.4  /  Alb  4.1  /  TBili  0.4  /  DBili  x   /  AST  23  /  ALT  19  /  AlkPhos  38<L>  12-21          RADIOLOGY & ADDITIONAL STUDIES: Team D SURGERY PROGRESS NOTE    SUBJECTIVE:  Pt seen at examined at bedside. AVSS.   Pain is controlled.  Staples of R abdominal incision removed at bedside with ease.   Denies flatus or BM    Vital Signs Last 24 Hrs  T(C): 36.9 (22 Dec 2017 08:02), Max: 37.1 (21 Dec 2017 22:47)  T(F): 98.5 (22 Dec 2017 08:02), Max: 98.8 (21 Dec 2017 22:47)  HR: 67 (22 Dec 2017 08:02) (65 - 77)  BP: 125/68 (22 Dec 2017 08:02) (121/87 - 150/72)  BP(mean): --  RR: 18 (22 Dec 2017 08:02) (15 - 18)  SpO2: 100% (22 Dec 2017 08:02) (98% - 100%)    	PHYSICAL EXAM:  NAD  Unlabored breathing, no retractions  Regular rate and rhythm  Soft, distended, mild tenderness in epigastrum.  RLQ incision C/D/I with staples in place. Incisional induration. No drainage expressed  Normal external genitalia      I&O's Summary    21 Dec 2017 07:01  -  22 Dec 2017 07:00  --------------------------------------------------------  IN: 500 mL / OUT: 100 mL / NET: 400 mL      I&O's Detail    21 Dec 2017 07:01  -  22 Dec 2017 07:00  --------------------------------------------------------  IN:    lactated ringers.: 500 mL  Total IN: 500 mL    OUT:    Voided: 100 mL  Total OUT: 100 mL    Total NET: 400 mL          MEDICATIONS  (STANDING):  enoxaparin Injectable 40 milliGRAM(s) SubCutaneous every 24 hours  lactated ringers. 1000 milliLiter(s) (100 mL/Hr) IV Continuous <Continuous>    MEDICATIONS  (PRN):      LABS:                        12.0   6.02  )-----------( 353      ( 22 Dec 2017 05:49 )             36.9     12-22    145  |  104  |  12  ----------------------------<  113<H>  3.8   |  31  |  0.60    Ca    8.8      22 Dec 2017 05:49    TPro  7.4  /  Alb  4.1  /  TBili  0.4  /  DBili  x   /  AST  23  /  ALT  19  /  AlkPhos  38<L>  12-21          RADIOLOGY & ADDITIONAL STUDIES:

## 2017-12-22 NOTE — H&P ADULT - NSHPREVIEWOFSYSTEMS_GEN_ALL_CORE
Systemic:	[ ] Fever	[ ] Chills	[ ] Night sweats    [ ] Fatigue	[ ] Other  [] Cardiovascular:  [] Pulmonary:  [] Renal/Urologic:  [x] Gastrointestinal: abdominal pain, constipation, obstipation  [] Metabolic:  [] Neurologic:  [] Hematologic:  [] ENT:  [] Ophthalmologic:  [] Musculoskeletal:

## 2017-12-22 NOTE — H&P ADULT - HISTORY OF PRESENT ILLNESS
61yo F s/p reversal of ileostomy on 12/1 (h/o colon ca s/p neoadjuvant chemo XRT and resection on 8/2017), presents to ER with decreased bowel movements, abdominal distension, and pain. The pt reports having a bowel movement on the morning of presentation, but decreased flatus and no bowel movements since. This was unusual, as the pt usually has multiple soft bowel movements throughout the day since her ileostomy reversal. She also notes that since she has not passed flatus, her abdomen has become distended and uncomfortable. She has not vomited.     10 days after surgery, the pt was seen by Dr. Moore and expressed concern regarding the high frequency and liquid nature of her bowel movements after reversal. Dr. Moore recommended she take metamucil with water, which she has been doing since then. Her bowel movements have thickened somewhat, to be "nearly normal."  She had been tolerating a diet until today.

## 2017-12-22 NOTE — H&P ADULT - NSHPPHYSICALEXAM_GEN_ALL_CORE
Vital Signs Last 24 Hrs  T(C): 36.6 (22 Dec 2017 02:34), Max: 37.1 (21 Dec 2017 22:47)  T(F): 97.9 (22 Dec 2017 02:34), Max: 98.8 (21 Dec 2017 22:47)  HR: 65 (22 Dec 2017 02:34) (65 - 77)  BP: 137/73 (22 Dec 2017 02:34) (121/87 - 150/72)  BP(mean): --  RR: 16 (22 Dec 2017 02:34) (15 - 16)  SpO2: 98% (22 Dec 2017 02:34) (98% - 99%)    PHYSICAL EXAM:    Constitutional: NAD, thin    Eyes: anicteric    ENMT: no rhinorrhea    Neck: supple    Respiratory: Clear bilaterally, respirations not labored, no retractions    Cardiovascular: RRR, NL S1S2    Gastrointestinal: Soft, distended, mild tenderness in epigastrum.  RLQ incision C/D/I with staples in place. Incisional induration. No drainage expressed    Genitourinary: Normal external genitalia    Extremities: No deformities    Vascular: Ext. warm, normal cap refill    Neurological: sensation and motor intact to all extremities    Skin: warm, dry, no rash    Lymph Nodes: no palpable adenopathy

## 2017-12-22 NOTE — H&P ADULT - ASSESSMENT
61yo F with SBO s/p ileostomy reversal, likely adhesive and not a stricture, given good bowel function since surgery until this point.     - NPO  - NGT if vomits  - IV fluid  - Ambulation  - Await return of GI function  - Will D/W Dr. Gonzalez (covering for Dr. Moore) in the morning    Daria Mckee [id number]

## 2017-12-22 NOTE — H&P ADULT - NSHPLABSRESULTS_GEN_ALL_CORE
13.1   8.67  )-----------( 381      ( 21 Dec 2017 22:40 )             40.4       12-21    141  |  97<L>  |  15  ----------------------------<  135<H>  3.6   |  31  |  0.66    Ca    9.4      21 Dec 2017 22:40    TPro  7.4  /  Alb  4.1  /  TBili  0.4  /  DBili  x   /  AST  23  /  ALT  19  /  AlkPhos  38<L>  12-21      < from: CT Abdomen and Pelvis w/ Oral Cont and w/ IV Cont (12.22.17 @ 00:28) >    IMPRESSION:   High-grade small bowel obstruction with transition point in the lower   abdomen in the midline, adjacent to the enteroenteric anastomosis. The   anastomosed segments appear uninvolved by the obstruction, and the   etiology is favored to be secondary to adjacent adhesions.    Moderate volume ascites, nonspecific but potentially reactive.    < end of copied text >

## 2017-12-23 LAB
BUN SERPL-MCNC: 7 MG/DL — SIGNIFICANT CHANGE UP (ref 7–23)
CALCIUM SERPL-MCNC: 8.7 MG/DL — SIGNIFICANT CHANGE UP (ref 8.4–10.5)
CHLORIDE SERPL-SCNC: 102 MMOL/L — SIGNIFICANT CHANGE UP (ref 98–107)
CO2 SERPL-SCNC: 29 MMOL/L — SIGNIFICANT CHANGE UP (ref 22–31)
CREAT SERPL-MCNC: 0.52 MG/DL — SIGNIFICANT CHANGE UP (ref 0.5–1.3)
GLUCOSE SERPL-MCNC: 84 MG/DL — SIGNIFICANT CHANGE UP (ref 70–99)
HCT VFR BLD CALC: 35.4 % — SIGNIFICANT CHANGE UP (ref 34.5–45)
HGB BLD-MCNC: 11.7 G/DL — SIGNIFICANT CHANGE UP (ref 11.5–15.5)
MAGNESIUM SERPL-MCNC: 2 MG/DL — SIGNIFICANT CHANGE UP (ref 1.6–2.6)
MCHC RBC-ENTMCNC: 31.5 PG — SIGNIFICANT CHANGE UP (ref 27–34)
MCHC RBC-ENTMCNC: 33.1 % — SIGNIFICANT CHANGE UP (ref 32–36)
MCV RBC AUTO: 95.2 FL — SIGNIFICANT CHANGE UP (ref 80–100)
NRBC # FLD: 0 — SIGNIFICANT CHANGE UP
PHOSPHATE SERPL-MCNC: 3.1 MG/DL — SIGNIFICANT CHANGE UP (ref 2.5–4.5)
PLATELET # BLD AUTO: 285 K/UL — SIGNIFICANT CHANGE UP (ref 150–400)
PMV BLD: 8.2 FL — SIGNIFICANT CHANGE UP (ref 7–13)
POTASSIUM SERPL-MCNC: 3.7 MMOL/L — SIGNIFICANT CHANGE UP (ref 3.5–5.3)
POTASSIUM SERPL-SCNC: 3.7 MMOL/L — SIGNIFICANT CHANGE UP (ref 3.5–5.3)
RBC # BLD: 3.72 M/UL — LOW (ref 3.8–5.2)
RBC # FLD: 13.1 % — SIGNIFICANT CHANGE UP (ref 10.3–14.5)
SODIUM SERPL-SCNC: 142 MMOL/L — SIGNIFICANT CHANGE UP (ref 135–145)
WBC # BLD: 4.02 K/UL — SIGNIFICANT CHANGE UP (ref 3.8–10.5)
WBC # FLD AUTO: 4.02 K/UL — SIGNIFICANT CHANGE UP (ref 3.8–10.5)

## 2017-12-23 RX ORDER — POTASSIUM CHLORIDE 20 MEQ
20 PACKET (EA) ORAL ONCE
Qty: 0 | Refills: 0 | Status: COMPLETED | OUTPATIENT
Start: 2017-12-23 | End: 2017-12-23

## 2017-12-23 RX ADMIN — Medication 20 MILLIEQUIVALENT(S): at 15:46

## 2017-12-23 RX ADMIN — SODIUM CHLORIDE 50 MILLILITER(S): 9 INJECTION, SOLUTION INTRAVENOUS at 08:50

## 2017-12-23 RX ADMIN — ENOXAPARIN SODIUM 40 MILLIGRAM(S): 100 INJECTION SUBCUTANEOUS at 05:55

## 2017-12-23 NOTE — PROGRESS NOTE ADULT - SUBJECTIVE AND OBJECTIVE BOX
Team D SURGERY PROGRESS NOTE        SUBJECTIVE: Pt seen at examined at bedside during morning rounds. AVSS. No acute events overnight. Pain well controlled. +Flatus/ +BM. Denies fever, CP, SOB, and NV.         Vital Signs Last 24 Hrs  T(C): 36.6 (23 Dec 2017 08:47), Max: 36.9 (22 Dec 2017 19:59)  T(F): 97.9 (23 Dec 2017 08:47), Max: 98.4 (22 Dec 2017 19:59)  HR: 68 (23 Dec 2017 08:47) (60 - 68)  BP: 108/53 (23 Dec 2017 08:47) (108/53 - 130/70)  BP(mean): --  RR: 18 (23 Dec 2017 08:47) (16 - 18)  SpO2: 98% (23 Dec 2017 08:47) (97% - 100%)    Physical Exam  General: awake, alert,    Pulm: respirations unlabored, no increased WOB  Abdomen: soft, mildly distended, NT, incision c/d/i  Extremities: Grossly symmetric    I&O's Summary    22 Dec 2017 07:01  -  23 Dec 2017 07:00  --------------------------------------------------------  IN: 450 mL / OUT: 300 mL / NET: 150 mL    23 Dec 2017 07:01  -  23 Dec 2017 12:28  --------------------------------------------------------  IN: 0 mL / OUT: 200 mL / NET: -200 mL      I&O's Detail    22 Dec 2017 07:01  -  23 Dec 2017 07:00  --------------------------------------------------------  IN:    lactated ringers.: 450 mL  Total IN: 450 mL    OUT:    Voided: 300 mL  Total OUT: 300 mL    Total NET: 150 mL      23 Dec 2017 07:01  -  23 Dec 2017 12:28  --------------------------------------------------------  IN:  Total IN: 0 mL    OUT:    Voided: 200 mL  Total OUT: 200 mL    Total NET: -200 mL          MEDICATIONS  (STANDING):  enoxaparin Injectable 40 milliGRAM(s) SubCutaneous every 24 hours  lactated ringers. 1000 milliLiter(s) (50 mL/Hr) IV Continuous <Continuous>  potassium chloride    Tablet ER 20 milliEquivalent(s) Oral once    MEDICATIONS  (PRN):      LABS:                        11.7   4.02  )-----------( 285      ( 23 Dec 2017 05:49 )             35.4     12-23    142  |  102  |  7   ----------------------------<  84  3.7   |  29  |  0.52    Ca    8.7      23 Dec 2017 05:49  Phos  3.1     12-23  Mg     2.0     12-23    TPro  7.4  /  Alb  4.1  /  TBili  0.4  /  DBili  x   /  AST  23  /  ALT  19  /  AlkPhos  38<L>  12-21          RADIOLOGY & ADDITIONAL STUDIES:

## 2017-12-24 ENCOUNTER — TRANSCRIPTION ENCOUNTER (OUTPATIENT)
Age: 62
End: 2017-12-24

## 2017-12-24 VITALS
OXYGEN SATURATION: 97 % | SYSTOLIC BLOOD PRESSURE: 105 MMHG | HEART RATE: 50 BPM | RESPIRATION RATE: 17 BRPM | TEMPERATURE: 99 F | DIASTOLIC BLOOD PRESSURE: 56 MMHG

## 2017-12-24 LAB
BUN SERPL-MCNC: 5 MG/DL — LOW (ref 7–23)
CALCIUM SERPL-MCNC: 8.7 MG/DL — SIGNIFICANT CHANGE UP (ref 8.4–10.5)
CHLORIDE SERPL-SCNC: 104 MMOL/L — SIGNIFICANT CHANGE UP (ref 98–107)
CO2 SERPL-SCNC: 32 MMOL/L — HIGH (ref 22–31)
CREAT SERPL-MCNC: 0.51 MG/DL — SIGNIFICANT CHANGE UP (ref 0.5–1.3)
GLUCOSE SERPL-MCNC: 88 MG/DL — SIGNIFICANT CHANGE UP (ref 70–99)
HCT VFR BLD CALC: 34.2 % — LOW (ref 34.5–45)
HGB BLD-MCNC: 11 G/DL — LOW (ref 11.5–15.5)
MAGNESIUM SERPL-MCNC: 2 MG/DL — SIGNIFICANT CHANGE UP (ref 1.6–2.6)
MCHC RBC-ENTMCNC: 30.2 PG — SIGNIFICANT CHANGE UP (ref 27–34)
MCHC RBC-ENTMCNC: 32.2 % — SIGNIFICANT CHANGE UP (ref 32–36)
MCV RBC AUTO: 94 FL — SIGNIFICANT CHANGE UP (ref 80–100)
NRBC # FLD: 0 — SIGNIFICANT CHANGE UP
PHOSPHATE SERPL-MCNC: 2.9 MG/DL — SIGNIFICANT CHANGE UP (ref 2.5–4.5)
PLATELET # BLD AUTO: 259 K/UL — SIGNIFICANT CHANGE UP (ref 150–400)
PMV BLD: 8.1 FL — SIGNIFICANT CHANGE UP (ref 7–13)
POTASSIUM SERPL-MCNC: 3.8 MMOL/L — SIGNIFICANT CHANGE UP (ref 3.5–5.3)
POTASSIUM SERPL-SCNC: 3.8 MMOL/L — SIGNIFICANT CHANGE UP (ref 3.5–5.3)
RBC # BLD: 3.64 M/UL — LOW (ref 3.8–5.2)
RBC # FLD: 13.2 % — SIGNIFICANT CHANGE UP (ref 10.3–14.5)
SODIUM SERPL-SCNC: 143 MMOL/L — SIGNIFICANT CHANGE UP (ref 135–145)
WBC # BLD: 3.9 K/UL — SIGNIFICANT CHANGE UP (ref 3.8–10.5)
WBC # FLD AUTO: 3.9 K/UL — SIGNIFICANT CHANGE UP (ref 3.8–10.5)

## 2017-12-24 RX ADMIN — SODIUM CHLORIDE 50 MILLILITER(S): 9 INJECTION, SOLUTION INTRAVENOUS at 05:28

## 2017-12-24 RX ADMIN — SODIUM CHLORIDE 50 MILLILITER(S): 9 INJECTION, SOLUTION INTRAVENOUS at 09:38

## 2017-12-24 RX ADMIN — ENOXAPARIN SODIUM 40 MILLIGRAM(S): 100 INJECTION SUBCUTANEOUS at 05:27

## 2017-12-24 NOTE — DISCHARGE NOTE ADULT - PLAN OF CARE
resolution of symptoms BATHING: Please do not submerge wound underwater. You may shower and/or sponge bathe.  ACTIVITY: No heavy lifting anything more than 10-15lbs or straining. Otherwise, you may return to your usual level of physical activity. If you are taking narcotic pain medication (such as Percocet), do NOT drive a car, operate machinery or make important decisions.  DIET: Low fiber diet  NOTIFY YOUR SURGEON IF: You have any bleeding that does not stop, any pus draining from your wound, any fever (over 100.4 F) or chills, persistent nausea/vomiting with inability to tolerate food or liquids, persistent diarrhea, or if your pain is not controlled on your discharge pain medications.  FOLLOW-UP:  1. Please call to make a 1-2 week follow-up appointment with Dr. Moore within one week of discharge   2. Please follow up with your primary care physician in one week regarding your hospitalization.

## 2017-12-24 NOTE — DISCHARGE NOTE ADULT - HOSPITAL COURSE
63yo F s/p reversal of ileostomy on 12/1 (h/o colon ca s/p neoadjuvant chemo XRT and resection on 8/2017), presents to ER on 12/21 with decreased bowel movements, abdominal distension, and pain. The pt reports having a bowel movement on the morning of presentation, but decreased flatus and no bowel movements since. This was unusual, as the pt usually has multiple soft bowel movements throughout the day since her ileostomy reversal. She also notes that since she has not passed flatus, her abdomen has become distended and uncomfortable. She has not vomited. 12/22 CTAP showed "high-grade small bowel obstruction with transition point in the lower abdomen in the midline, adjacent to the enteroenteric anastomosis. The anastomosed segments appear uninvolved by the obstruction, and the etiology is favored to be secondary to adjacent adhesions." Nausea improved and patient started passing flatus and an NGT was not placed. She had an episode of a bloody BM on 12/22. At the time of discharge, the patient was hemodynamically stable, was tolerating PO diet, was voiding urine and passing stool, was ambulating, and was comfortable with adequate pain control. The patient was instructed to follow up with Dr. Moore within 1-2 weeks after discharge from the hospital. The patient felt comfortable with discharge. The patient was discharged to home on HD#3. The patient had no other issues.

## 2017-12-24 NOTE — DISCHARGE NOTE ADULT - MEDICATION SUMMARY - MEDICATIONS TO TAKE
I will START or STAY ON the medications listed below when I get home from the hospital:    acetaminophen 325 mg oral tablet  -- 2 tab(s) by mouth every 6 hours  -- Indication: For post op pain    oxyCODONE 5 mg oral tablet  -- 1 tab(s) by mouth every 4 hours, As needed, Moderate Pain (4 - 6) MDD:6  -- Indication: For post op pain    Vitamin D3  -- 1 tab(s) by mouth once a day in am  -- Indication: For Supplement    Vitamin C  -- 1 tab(s) by mouth once a day in pm  -- Indication: For Supplement

## 2017-12-24 NOTE — DISCHARGE NOTE ADULT - PATIENT PORTAL LINK FT
“You can access the [employer] Patient Portal, offered by [facility], by registering with the following website: [email or web address]”

## 2017-12-24 NOTE — PROGRESS NOTE ADULT - ASSESSMENT
61yo F with SBO s/p ileostomy reversal, no further nausea.  Will continue to monitor clinically    - CLD  - Pain control  - IV fluid  - Ambulation      [id number]
61yo F with SBO s/p ileostomy reversal, no further nausea.  Will continue to monitor clinically    - LRD  - Pain control  - IV fluid  - Ambulation  - d/c today       [id number]
61yo F with SBO s/p ileostomy reversal, no further nausea.  Will continue to monitor clinically    - NPO  - NGT if vomits  - Pain control  - IV fluid  - Ambulation  - Await return of GI function    Danny Chopra  [id number]

## 2017-12-24 NOTE — DISCHARGE NOTE ADULT - CARE PLAN
Principal Discharge DX:	History of low anterior resection of rectum  Goal:	resolution of symptoms  Instructions for follow-up, activity and diet:	BATHING: Please do not submerge wound underwater. You may shower and/or sponge bathe.  ACTIVITY: No heavy lifting anything more than 10-15lbs or straining. Otherwise, you may return to your usual level of physical activity. If you are taking narcotic pain medication (such as Percocet), do NOT drive a car, operate machinery or make important decisions.  DIET: Low fiber diet  NOTIFY YOUR SURGEON IF: You have any bleeding that does not stop, any pus draining from your wound, any fever (over 100.4 F) or chills, persistent nausea/vomiting with inability to tolerate food or liquids, persistent diarrhea, or if your pain is not controlled on your discharge pain medications.  FOLLOW-UP:  1. Please call to make a 1-2 week follow-up appointment with Dr. Moore within one week of discharge   2. Please follow up with your primary care physician in one week regarding your hospitalization.

## 2017-12-24 NOTE — DISCHARGE NOTE ADULT - CARE PROVIDER_API CALL
Kory Moore (MD), Surgery  [address] [place] [address]  Phone: [phone]  Fax: [phone]

## 2017-12-24 NOTE — DISCHARGE NOTE ADULT - INSTRUCTIONS
Keep surgical incisions clean and dry. For any signs of infection such as fever over 101F, new pain that cannot be controlled with ordered medication, unusual discharge, and or chills, please call your primary care provider or visit the emergency room.

## 2017-12-27 ENCOUNTER — APPOINTMENT (OUTPATIENT)
Dept: SURGICAL ONCOLOGY | Facility: CLINIC | Age: 62
End: 2017-12-27
Payer: MEDICAID

## 2017-12-27 VITALS
BODY MASS INDEX: 17.71 KG/M2 | HEIGHT: 61.5 IN | OXYGEN SATURATION: 99 % | SYSTOLIC BLOOD PRESSURE: 128 MMHG | WEIGHT: 95 LBS | TEMPERATURE: 98 F | RESPIRATION RATE: 14 BRPM | HEART RATE: 69 BPM | DIASTOLIC BLOOD PRESSURE: 66 MMHG

## 2017-12-27 PROCEDURE — 99024 POSTOP FOLLOW-UP VISIT: CPT

## 2017-12-27 PROCEDURE — 10180 I&D COMPLEX PO WOUND INFCTJ: CPT | Mod: 58

## 2018-01-26 ENCOUNTER — FORM ENCOUNTER (OUTPATIENT)
Age: 63
End: 2018-01-26

## 2018-01-27 ENCOUNTER — OUTPATIENT (OUTPATIENT)
Dept: OUTPATIENT SERVICES | Facility: HOSPITAL | Age: 63
LOS: 1 days | End: 2018-01-27
Payer: MEDICAID

## 2018-01-27 ENCOUNTER — APPOINTMENT (OUTPATIENT)
Dept: CT IMAGING | Facility: IMAGING CENTER | Age: 63
End: 2018-01-27
Payer: MEDICAID

## 2018-01-27 DIAGNOSIS — Z90.49 ACQUIRED ABSENCE OF OTHER SPECIFIED PARTS OF DIGESTIVE TRACT: Chronic | ICD-10-CM

## 2018-01-27 DIAGNOSIS — Z00.8 ENCOUNTER FOR OTHER GENERAL EXAMINATION: ICD-10-CM

## 2018-01-27 DIAGNOSIS — Z98.890 OTHER SPECIFIED POSTPROCEDURAL STATES: Chronic | ICD-10-CM

## 2018-01-27 PROCEDURE — 71260 CT THORAX DX C+: CPT

## 2018-01-27 PROCEDURE — 74177 CT ABD & PELVIS W/CONTRAST: CPT | Mod: 26

## 2018-01-27 PROCEDURE — 71260 CT THORAX DX C+: CPT | Mod: 26

## 2018-01-27 PROCEDURE — 74177 CT ABD & PELVIS W/CONTRAST: CPT

## 2018-02-12 ENCOUNTER — APPOINTMENT (OUTPATIENT)
Dept: SURGICAL ONCOLOGY | Facility: CLINIC | Age: 63
End: 2018-02-12
Payer: MEDICAID

## 2018-02-12 VITALS
HEART RATE: 65 BPM | DIASTOLIC BLOOD PRESSURE: 77 MMHG | SYSTOLIC BLOOD PRESSURE: 136 MMHG | HEIGHT: 62 IN | WEIGHT: 102 LBS | BODY MASS INDEX: 18.77 KG/M2 | OXYGEN SATURATION: 98 %

## 2018-02-12 PROCEDURE — 99024 POSTOP FOLLOW-UP VISIT: CPT

## 2018-02-27 ENCOUNTER — RESULT REVIEW (OUTPATIENT)
Age: 63
End: 2018-02-27

## 2018-02-27 ENCOUNTER — APPOINTMENT (OUTPATIENT)
Dept: RADIATION ONCOLOGY | Facility: CLINIC | Age: 63
End: 2018-02-27
Payer: MEDICAID

## 2018-02-27 VITALS
OXYGEN SATURATION: 96 % | DIASTOLIC BLOOD PRESSURE: 71 MMHG | HEART RATE: 62 BPM | HEIGHT: 62 IN | WEIGHT: 103.84 LBS | TEMPERATURE: 98.1 F | BODY MASS INDEX: 19.11 KG/M2 | RESPIRATION RATE: 14 BRPM | SYSTOLIC BLOOD PRESSURE: 132 MMHG

## 2018-02-27 PROCEDURE — 99214 OFFICE O/P EST MOD 30 MIN: CPT

## 2018-03-21 ENCOUNTER — APPOINTMENT (OUTPATIENT)
Dept: SURGICAL ONCOLOGY | Facility: CLINIC | Age: 63
End: 2018-03-21

## 2018-03-29 ENCOUNTER — FORM ENCOUNTER (OUTPATIENT)
Age: 63
End: 2018-03-29

## 2018-03-30 ENCOUNTER — APPOINTMENT (OUTPATIENT)
Dept: CT IMAGING | Facility: IMAGING CENTER | Age: 63
End: 2018-03-30
Payer: MEDICAID

## 2018-03-30 ENCOUNTER — OUTPATIENT (OUTPATIENT)
Dept: OUTPATIENT SERVICES | Facility: HOSPITAL | Age: 63
LOS: 1 days | End: 2018-03-30
Payer: MEDICAID

## 2018-03-30 DIAGNOSIS — Z90.49 ACQUIRED ABSENCE OF OTHER SPECIFIED PARTS OF DIGESTIVE TRACT: Chronic | ICD-10-CM

## 2018-03-30 DIAGNOSIS — C20 MALIGNANT NEOPLASM OF RECTUM: ICD-10-CM

## 2018-03-30 DIAGNOSIS — Z98.890 OTHER SPECIFIED POSTPROCEDURAL STATES: Chronic | ICD-10-CM

## 2018-03-30 PROCEDURE — 71260 CT THORAX DX C+: CPT | Mod: 26

## 2018-03-30 PROCEDURE — 82565 ASSAY OF CREATININE: CPT

## 2018-03-30 PROCEDURE — 71260 CT THORAX DX C+: CPT

## 2018-04-25 ENCOUNTER — APPOINTMENT (OUTPATIENT)
Dept: SURGICAL ONCOLOGY | Facility: CLINIC | Age: 63
End: 2018-04-25
Payer: MEDICAID

## 2018-04-25 VITALS
SYSTOLIC BLOOD PRESSURE: 108 MMHG | OXYGEN SATURATION: 97 % | DIASTOLIC BLOOD PRESSURE: 64 MMHG | HEIGHT: 62 IN | WEIGHT: 102 LBS | HEART RATE: 65 BPM | BODY MASS INDEX: 18.77 KG/M2 | RESPIRATION RATE: 15 BRPM

## 2018-04-25 PROCEDURE — 99214 OFFICE O/P EST MOD 30 MIN: CPT

## 2018-05-07 ENCOUNTER — APPOINTMENT (OUTPATIENT)
Dept: SURGICAL ONCOLOGY | Facility: CLINIC | Age: 63
End: 2018-05-07

## 2018-05-22 NOTE — H&P PST ADULT - COMFORT LEVEL, ACCEPTABLE
5 Left UE Active ROM was WFL (within functional limits)/Right UE Active ROM was WFL (within functional limits)/Left LE Active ROM was WFL (within functional limits)/Right LE Active ROM was WFL (within functional limits)

## 2018-05-24 ENCOUNTER — OUTPATIENT (OUTPATIENT)
Dept: OUTPATIENT SERVICES | Facility: HOSPITAL | Age: 63
LOS: 1 days | Discharge: HOME | End: 2018-05-24

## 2018-05-24 DIAGNOSIS — Z98.890 OTHER SPECIFIED POSTPROCEDURAL STATES: Chronic | ICD-10-CM

## 2018-05-24 DIAGNOSIS — C20 MALIGNANT NEOPLASM OF RECTUM: ICD-10-CM

## 2018-05-24 DIAGNOSIS — Z90.49 ACQUIRED ABSENCE OF OTHER SPECIFIED PARTS OF DIGESTIVE TRACT: Chronic | ICD-10-CM

## 2018-05-30 ENCOUNTER — RESULT REVIEW (OUTPATIENT)
Age: 63
End: 2018-05-30

## 2018-05-30 ENCOUNTER — APPOINTMENT (OUTPATIENT)
Dept: HEMATOLOGY ONCOLOGY | Facility: CLINIC | Age: 63
End: 2018-05-30
Payer: MEDICAID

## 2018-05-30 VITALS
SYSTOLIC BLOOD PRESSURE: 136 MMHG | WEIGHT: 107.34 LBS | BODY MASS INDEX: 19.64 KG/M2 | HEART RATE: 56 BPM | DIASTOLIC BLOOD PRESSURE: 68 MMHG | TEMPERATURE: 97.9 F | RESPIRATION RATE: 16 BRPM | OXYGEN SATURATION: 100 %

## 2018-05-30 LAB
ALBUMIN SERPL ELPH-MCNC: 4.1 G/DL
ALP BLD-CCNC: 40 U/L
ALT SERPL-CCNC: 14 U/L
ANION GAP SERPL CALC-SCNC: 9 MMOL/L
AST SERPL-CCNC: 21 U/L
BASOPHILS # BLD AUTO: 0 K/UL — SIGNIFICANT CHANGE UP (ref 0–0.2)
BILIRUB SERPL-MCNC: 0.9 MG/DL
BUN SERPL-MCNC: 15 MG/DL
CALCIUM SERPL-MCNC: 9.2 MG/DL
CEA SERPL-MCNC: 1.7 NG/ML
CHLORIDE SERPL-SCNC: 102 MMOL/L
CO2 SERPL-SCNC: 30 MMOL/L
CREAT SERPL-MCNC: 0.67 MG/DL
EOSINOPHIL # BLD AUTO: 0.1 K/UL — SIGNIFICANT CHANGE UP (ref 0–0.5)
EOSINOPHIL NFR BLD AUTO: 2 % — SIGNIFICANT CHANGE UP (ref 0–6)
GLUCOSE SERPL-MCNC: 85 MG/DL
HCT VFR BLD CALC: 36.6 % — SIGNIFICANT CHANGE UP (ref 34.5–45)
HGB BLD-MCNC: 12.2 G/DL — SIGNIFICANT CHANGE UP (ref 11.5–15.5)
LYMPHOCYTES # BLD AUTO: 0.8 K/UL — LOW (ref 1–3.3)
LYMPHOCYTES # BLD AUTO: 36 % — SIGNIFICANT CHANGE UP (ref 13–44)
MCHC RBC-ENTMCNC: 31.9 PG — SIGNIFICANT CHANGE UP (ref 27–34)
MCHC RBC-ENTMCNC: 33.4 G/DL — SIGNIFICANT CHANGE UP (ref 32–36)
MCV RBC AUTO: 95.4 FL — SIGNIFICANT CHANGE UP (ref 80–100)
MONOCYTES # BLD AUTO: 0.2 K/UL — SIGNIFICANT CHANGE UP (ref 0–0.9)
MONOCYTES NFR BLD AUTO: 10 % — SIGNIFICANT CHANGE UP (ref 2–14)
NEUTROPHILS # BLD AUTO: 1.4 K/UL — LOW (ref 1.8–7.4)
NEUTROPHILS NFR BLD AUTO: 52 % — SIGNIFICANT CHANGE UP (ref 43–77)
PLAT MORPH BLD: NORMAL — SIGNIFICANT CHANGE UP
PLATELET # BLD AUTO: 222 K/UL — SIGNIFICANT CHANGE UP (ref 150–400)
POTASSIUM SERPL-SCNC: 4.7 MMOL/L
PROT SERPL-MCNC: 6.6 G/DL
RBC # BLD: 3.84 M/UL — SIGNIFICANT CHANGE UP (ref 3.8–5.2)
RBC # FLD: 11.7 % — SIGNIFICANT CHANGE UP (ref 10.3–14.5)
RBC BLD AUTO: SIGNIFICANT CHANGE UP
SODIUM SERPL-SCNC: 141 MMOL/L
WBC # BLD: 2.4 K/UL — LOW (ref 3.8–10.5)
WBC # FLD AUTO: 2.4 K/UL — LOW (ref 3.8–10.5)

## 2018-05-30 PROCEDURE — 99214 OFFICE O/P EST MOD 30 MIN: CPT

## 2018-05-30 RX ORDER — POTASSIUM CHLORIDE 1500 MG/1
20 TABLET, EXTENDED RELEASE ORAL
Qty: 4 | Refills: 0 | Status: DISCONTINUED | COMMUNITY
Start: 2017-07-10 | End: 2018-05-30

## 2018-05-30 RX ORDER — CAPECITABINE 150 MG/1
150 TABLET, FILM COATED ORAL
Qty: 60 | Refills: 0 | Status: DISCONTINUED | COMMUNITY
Start: 2017-04-13 | End: 2018-05-30

## 2018-05-30 RX ORDER — OXYCODONE 5 MG/1
5 TABLET ORAL
Qty: 18 | Refills: 0 | Status: DISCONTINUED | COMMUNITY
Start: 2017-08-11 | End: 2018-05-30

## 2018-05-30 RX ORDER — HYDROCORTISONE 2.5% 25 MG/G
2.5 CREAM TOPICAL
Qty: 30 | Refills: 0 | Status: DISCONTINUED | COMMUNITY
Start: 2017-06-14 | End: 2018-05-30

## 2018-05-30 RX ORDER — CAPECITABINE 500 MG/1
500 TABLET ORAL
Qty: 60 | Refills: 0 | Status: DISCONTINUED | COMMUNITY
Start: 2017-04-13 | End: 2018-05-30

## 2018-05-30 RX ORDER — MULTIVIT-MIN/IRON/FOLIC ACID/K 18-600-40
500 CAPSULE ORAL
Refills: 0 | Status: DISCONTINUED | COMMUNITY
End: 2018-05-30

## 2018-05-30 RX ORDER — NEOMYCIN SULFATE 500 MG/1
500 TABLET ORAL
Qty: 6 | Refills: 0 | Status: DISCONTINUED | COMMUNITY
Start: 2017-07-10 | End: 2018-05-30

## 2018-05-30 RX ORDER — METRONIDAZOLE 250 MG/1
250 TABLET ORAL
Qty: 3 | Refills: 0 | Status: DISCONTINUED | COMMUNITY
Start: 2017-07-10 | End: 2018-05-30

## 2018-07-08 ENCOUNTER — FORM ENCOUNTER (OUTPATIENT)
Age: 63
End: 2018-07-08

## 2018-07-09 ENCOUNTER — OUTPATIENT (OUTPATIENT)
Dept: OUTPATIENT SERVICES | Facility: HOSPITAL | Age: 63
LOS: 1 days | End: 2018-07-09
Payer: MEDICAID

## 2018-07-09 ENCOUNTER — APPOINTMENT (OUTPATIENT)
Dept: CT IMAGING | Facility: IMAGING CENTER | Age: 63
End: 2018-07-09
Payer: MEDICAID

## 2018-07-09 DIAGNOSIS — Z90.49 ACQUIRED ABSENCE OF OTHER SPECIFIED PARTS OF DIGESTIVE TRACT: Chronic | ICD-10-CM

## 2018-07-09 DIAGNOSIS — C20 MALIGNANT NEOPLASM OF RECTUM: ICD-10-CM

## 2018-07-09 DIAGNOSIS — Z98.890 OTHER SPECIFIED POSTPROCEDURAL STATES: Chronic | ICD-10-CM

## 2018-07-09 PROCEDURE — 82565 ASSAY OF CREATININE: CPT

## 2018-07-09 PROCEDURE — 71260 CT THORAX DX C+: CPT

## 2018-07-09 PROCEDURE — 71260 CT THORAX DX C+: CPT | Mod: 26

## 2018-07-09 PROCEDURE — 74177 CT ABD & PELVIS W/CONTRAST: CPT

## 2018-07-09 PROCEDURE — 74177 CT ABD & PELVIS W/CONTRAST: CPT | Mod: 26

## 2018-07-18 PROBLEM — I10 ESSENTIAL (PRIMARY) HYPERTENSION: Chronic | Status: ACTIVE | Noted: 2017-11-17

## 2018-07-23 ENCOUNTER — APPOINTMENT (OUTPATIENT)
Dept: SURGICAL ONCOLOGY | Facility: CLINIC | Age: 63
End: 2018-07-23
Payer: MEDICAID

## 2018-07-23 VITALS
BODY MASS INDEX: 19.69 KG/M2 | OXYGEN SATURATION: 99 % | HEART RATE: 55 BPM | DIASTOLIC BLOOD PRESSURE: 75 MMHG | HEIGHT: 62 IN | RESPIRATION RATE: 16 BRPM | WEIGHT: 107 LBS | SYSTOLIC BLOOD PRESSURE: 131 MMHG

## 2018-07-23 PROCEDURE — 99214 OFFICE O/P EST MOD 30 MIN: CPT

## 2018-10-05 ENCOUNTER — OUTPATIENT (OUTPATIENT)
Dept: OUTPATIENT SERVICES | Facility: HOSPITAL | Age: 63
LOS: 1 days | Discharge: HOME | End: 2018-10-05

## 2018-10-05 DIAGNOSIS — Z90.49 ACQUIRED ABSENCE OF OTHER SPECIFIED PARTS OF DIGESTIVE TRACT: Chronic | ICD-10-CM

## 2018-10-05 DIAGNOSIS — C20 MALIGNANT NEOPLASM OF RECTUM: ICD-10-CM

## 2018-10-05 DIAGNOSIS — Z98.890 OTHER SPECIFIED POSTPROCEDURAL STATES: Chronic | ICD-10-CM

## 2018-10-15 ENCOUNTER — RESULT REVIEW (OUTPATIENT)
Age: 63
End: 2018-10-15

## 2018-10-15 ENCOUNTER — APPOINTMENT (OUTPATIENT)
Dept: HEMATOLOGY ONCOLOGY | Facility: CLINIC | Age: 63
End: 2018-10-15
Payer: MEDICAID

## 2018-10-15 VITALS
DIASTOLIC BLOOD PRESSURE: 70 MMHG | WEIGHT: 110.23 LBS | HEART RATE: 65 BPM | BODY MASS INDEX: 20.16 KG/M2 | TEMPERATURE: 98.2 F | RESPIRATION RATE: 16 BRPM | OXYGEN SATURATION: 98 % | SYSTOLIC BLOOD PRESSURE: 110 MMHG

## 2018-10-15 LAB
ALBUMIN SERPL ELPH-MCNC: 4.3 G/DL
ALP BLD-CCNC: 39 U/L
ALT SERPL-CCNC: 14 U/L
ANION GAP SERPL CALC-SCNC: 12 MMOL/L
AST SERPL-CCNC: 22 U/L
BASOPHILS # BLD AUTO: 0 K/UL — SIGNIFICANT CHANGE UP (ref 0–0.2)
BASOPHILS NFR BLD AUTO: 0.7 % — SIGNIFICANT CHANGE UP (ref 0–2)
BILIRUB SERPL-MCNC: 0.8 MG/DL
BUN SERPL-MCNC: 11 MG/DL
CALCIUM SERPL-MCNC: 9.3 MG/DL
CEA SERPL-MCNC: 1.5 NG/ML
CHLORIDE SERPL-SCNC: 100 MMOL/L
CO2 SERPL-SCNC: 28 MMOL/L
CREAT SERPL-MCNC: 0.64 MG/DL
EOSINOPHIL # BLD AUTO: 0.1 K/UL — SIGNIFICANT CHANGE UP (ref 0–0.5)
EOSINOPHIL NFR BLD AUTO: 2.9 % — SIGNIFICANT CHANGE UP (ref 0–6)
GLUCOSE SERPL-MCNC: 82 MG/DL
HCT VFR BLD CALC: 38.1 % — SIGNIFICANT CHANGE UP (ref 34.5–45)
HGB BLD-MCNC: 12.7 G/DL — SIGNIFICANT CHANGE UP (ref 11.5–15.5)
LYMPHOCYTES # BLD AUTO: 0.9 K/UL — LOW (ref 1–3.3)
LYMPHOCYTES # BLD AUTO: 35.8 % — SIGNIFICANT CHANGE UP (ref 13–44)
MCHC RBC-ENTMCNC: 31.6 PG — SIGNIFICANT CHANGE UP (ref 27–34)
MCHC RBC-ENTMCNC: 33.4 G/DL — SIGNIFICANT CHANGE UP (ref 32–36)
MCV RBC AUTO: 94.8 FL — SIGNIFICANT CHANGE UP (ref 80–100)
MONOCYTES # BLD AUTO: 0.2 K/UL — SIGNIFICANT CHANGE UP (ref 0–0.9)
MONOCYTES NFR BLD AUTO: 8.1 % — SIGNIFICANT CHANGE UP (ref 2–14)
NEUTROPHILS # BLD AUTO: 1.3 K/UL — LOW (ref 1.8–7.4)
NEUTROPHILS NFR BLD AUTO: 52.4 % — SIGNIFICANT CHANGE UP (ref 43–77)
PLATELET # BLD AUTO: 211 K/UL — SIGNIFICANT CHANGE UP (ref 150–400)
POTASSIUM SERPL-SCNC: 4.4 MMOL/L
PROT SERPL-MCNC: 6.6 G/DL
RBC # BLD: 4.02 M/UL — SIGNIFICANT CHANGE UP (ref 3.8–5.2)
RBC # FLD: 11.4 % — SIGNIFICANT CHANGE UP (ref 10.3–14.5)
SODIUM SERPL-SCNC: 140 MMOL/L
WBC # BLD: 2.5 K/UL — LOW (ref 3.8–10.5)
WBC # FLD AUTO: 2.5 K/UL — LOW (ref 3.8–10.5)

## 2018-10-15 PROCEDURE — 99214 OFFICE O/P EST MOD 30 MIN: CPT

## 2018-10-22 ENCOUNTER — APPOINTMENT (OUTPATIENT)
Dept: SURGICAL ONCOLOGY | Facility: CLINIC | Age: 63
End: 2018-10-22
Payer: MEDICAID

## 2018-10-22 VITALS
DIASTOLIC BLOOD PRESSURE: 75 MMHG | BODY MASS INDEX: 19.31 KG/M2 | RESPIRATION RATE: 15 BRPM | HEIGHT: 63 IN | HEART RATE: 63 BPM | SYSTOLIC BLOOD PRESSURE: 130 MMHG | WEIGHT: 109 LBS

## 2018-10-22 PROCEDURE — 99214 OFFICE O/P EST MOD 30 MIN: CPT

## 2018-11-26 NOTE — H&P PST ADULT - DENTITION
Lexis Bill (mother) is calling in regards to the patient, trying to get an appointment for anytime after 5pm. Checked for same day appts today nothing was availabe after 5pm, Lexis Bill wants to know what to do next, pt is taking cough medicine, not working. Lexis Bill wants to see if the pt can get an antibiotic.     Best callback: [phone]    LOV:  Friday, November 02, 2018
Spoke to mom Ale Adjutant). she states that the pt has had a cold for the last 2 weeks. A cough with a runny nose. She didn't know if he needed to get an ABX or not. Advised that it would be best for us to see him. I offered several appts for today. She is going to call her  and get back with me. I offered to at [address] something and she declined saying that she would call me right back.
denies loose teeth or implants

## 2018-12-06 ENCOUNTER — INPATIENT (INPATIENT)
Facility: HOSPITAL | Age: 63
LOS: 2 days | Discharge: HOME | End: 2018-12-09
Attending: SPECIALIST | Admitting: SPECIALIST
Payer: MEDICAID

## 2018-12-06 VITALS
OXYGEN SATURATION: 97 % | TEMPERATURE: 98 F | SYSTOLIC BLOOD PRESSURE: 159 MMHG | DIASTOLIC BLOOD PRESSURE: 65 MMHG | HEART RATE: 69 BPM | RESPIRATION RATE: 20 BRPM

## 2018-12-06 DIAGNOSIS — Z98.890 OTHER SPECIFIED POSTPROCEDURAL STATES: Chronic | ICD-10-CM

## 2018-12-06 DIAGNOSIS — Z90.49 ACQUIRED ABSENCE OF OTHER SPECIFIED PARTS OF DIGESTIVE TRACT: Chronic | ICD-10-CM

## 2018-12-06 NOTE — ED ADULT TRIAGE NOTE - CHIEF COMPLAINT QUOTE
Pt with a pmhx of colo-rectal CA requiring surgeries and hx of sbo's presents with c/o abd pain. States she is not passing flatus. Pt with a pmhx of colo-rectal CA requiring surgeries and hx of sbo's presents with c/o abd pain. States she is not passing flatus. Pt kneeling over on the floor due to pain.

## 2018-12-07 DIAGNOSIS — K56.609 UNSPECIFIED INTESTINAL OBSTRUCTION, UNSPECIFIED AS TO PARTIAL VERSUS COMPLETE OBSTRUCTION: ICD-10-CM

## 2018-12-07 LAB
ALBUMIN SERPL ELPH-MCNC: 4.3 G/DL — SIGNIFICANT CHANGE UP (ref 3.3–5)
ALP SERPL-CCNC: 43 U/L — SIGNIFICANT CHANGE UP (ref 40–120)
ALT FLD-CCNC: 14 U/L — SIGNIFICANT CHANGE UP (ref 4–33)
APPEARANCE UR: SIGNIFICANT CHANGE UP
APTT BLD: 29.6 SEC — SIGNIFICANT CHANGE UP (ref 27.5–36.3)
AST SERPL-CCNC: 21 U/L — SIGNIFICANT CHANGE UP (ref 4–32)
BACTERIA # UR AUTO: NEGATIVE — SIGNIFICANT CHANGE UP
BASE EXCESS BLDV CALC-SCNC: 8.6 MMOL/L — SIGNIFICANT CHANGE UP
BASOPHILS # BLD AUTO: 0.03 K/UL — SIGNIFICANT CHANGE UP (ref 0–0.2)
BASOPHILS NFR BLD AUTO: 0.4 % — SIGNIFICANT CHANGE UP (ref 0–2)
BILIRUB SERPL-MCNC: 0.5 MG/DL — SIGNIFICANT CHANGE UP (ref 0.2–1.2)
BILIRUB UR-MCNC: NEGATIVE — SIGNIFICANT CHANGE UP
BLD GP AB SCN SERPL QL: NEGATIVE — SIGNIFICANT CHANGE UP
BLOOD GAS VENOUS - CREATININE: 0.63 MG/DL — SIGNIFICANT CHANGE UP (ref 0.5–1.3)
BLOOD UR QL VISUAL: NEGATIVE — SIGNIFICANT CHANGE UP
BUN SERPL-MCNC: 16 MG/DL — SIGNIFICANT CHANGE UP (ref 7–23)
CALCIUM SERPL-MCNC: 9.7 MG/DL — SIGNIFICANT CHANGE UP (ref 8.4–10.5)
CHLORIDE BLDV-SCNC: 98 MMOL/L — SIGNIFICANT CHANGE UP (ref 96–108)
CHLORIDE SERPL-SCNC: 98 MMOL/L — SIGNIFICANT CHANGE UP (ref 98–107)
CO2 SERPL-SCNC: 30 MMOL/L — SIGNIFICANT CHANGE UP (ref 22–31)
COLOR SPEC: YELLOW — SIGNIFICANT CHANGE UP
CREAT SERPL-MCNC: 0.66 MG/DL — SIGNIFICANT CHANGE UP (ref 0.5–1.3)
EOSINOPHIL # BLD AUTO: 0.06 K/UL — SIGNIFICANT CHANGE UP (ref 0–0.5)
EOSINOPHIL NFR BLD AUTO: 0.8 % — SIGNIFICANT CHANGE UP (ref 0–6)
GAS PNL BLDV: 134 MMOL/L — LOW (ref 136–146)
GLUCOSE BLDV-MCNC: 133 — HIGH (ref 70–99)
GLUCOSE SERPL-MCNC: 123 MG/DL — HIGH (ref 70–99)
GLUCOSE UR-MCNC: NEGATIVE — SIGNIFICANT CHANGE UP
HCO3 BLDV-SCNC: 30 MMOL/L — HIGH (ref 20–27)
HCT VFR BLD CALC: 39.5 % — SIGNIFICANT CHANGE UP (ref 34.5–45)
HCT VFR BLDV CALC: 42.4 % — SIGNIFICANT CHANGE UP (ref 34.5–45)
HGB BLD-MCNC: 13.2 G/DL — SIGNIFICANT CHANGE UP (ref 11.5–15.5)
HGB BLDV-MCNC: 13.8 G/DL — SIGNIFICANT CHANGE UP (ref 11.5–15.5)
HYALINE CASTS # UR AUTO: NEGATIVE — SIGNIFICANT CHANGE UP
IMM GRANULOCYTES # BLD AUTO: 0.01 # — SIGNIFICANT CHANGE UP
IMM GRANULOCYTES NFR BLD AUTO: 0.1 % — SIGNIFICANT CHANGE UP (ref 0–1.5)
INR BLD: 0.9 — SIGNIFICANT CHANGE UP (ref 0.88–1.17)
KETONES UR-MCNC: NEGATIVE — SIGNIFICANT CHANGE UP
LACTATE BLDV-MCNC: 1.1 MMOL/L — SIGNIFICANT CHANGE UP (ref 0.5–2)
LEUKOCYTE ESTERASE UR-ACNC: NEGATIVE — SIGNIFICANT CHANGE UP
LYMPHOCYTES # BLD AUTO: 0.72 K/UL — LOW (ref 1–3.3)
LYMPHOCYTES # BLD AUTO: 10.2 % — LOW (ref 13–44)
MCHC RBC-ENTMCNC: 31.2 PG — SIGNIFICANT CHANGE UP (ref 27–34)
MCHC RBC-ENTMCNC: 33.4 % — SIGNIFICANT CHANGE UP (ref 32–36)
MCV RBC AUTO: 93.4 FL — SIGNIFICANT CHANGE UP (ref 80–100)
MONOCYTES # BLD AUTO: 0.37 K/UL — SIGNIFICANT CHANGE UP (ref 0–0.9)
MONOCYTES NFR BLD AUTO: 5.2 % — SIGNIFICANT CHANGE UP (ref 2–14)
NEUTROPHILS # BLD AUTO: 5.89 K/UL — SIGNIFICANT CHANGE UP (ref 1.8–7.4)
NEUTROPHILS NFR BLD AUTO: 83.3 % — HIGH (ref 43–77)
NITRITE UR-MCNC: NEGATIVE — SIGNIFICANT CHANGE UP
NRBC # FLD: 0 — SIGNIFICANT CHANGE UP
PCO2 BLDV: 58 MMHG — HIGH (ref 41–51)
PH BLDV: 7.39 PH — SIGNIFICANT CHANGE UP (ref 7.32–7.43)
PH UR: 8.5 — HIGH (ref 5–8)
PLATELET # BLD AUTO: 222 K/UL — SIGNIFICANT CHANGE UP (ref 150–400)
PMV BLD: 8.4 FL — SIGNIFICANT CHANGE UP (ref 7–13)
PO2 BLDV: 28 MMHG — LOW (ref 35–40)
POTASSIUM BLDV-SCNC: SIGNIFICANT CHANGE UP MMOL/L (ref 3.4–4.5)
POTASSIUM SERPL-MCNC: 4.1 MMOL/L — SIGNIFICANT CHANGE UP (ref 3.5–5.3)
POTASSIUM SERPL-SCNC: 4.1 MMOL/L — SIGNIFICANT CHANGE UP (ref 3.5–5.3)
PROT SERPL-MCNC: 7.3 G/DL — SIGNIFICANT CHANGE UP (ref 6–8.3)
PROT UR-MCNC: 30 — SIGNIFICANT CHANGE UP
PROTHROM AB SERPL-ACNC: 10.2 SEC — SIGNIFICANT CHANGE UP (ref 9.8–13.1)
RBC # BLD: 4.23 M/UL — SIGNIFICANT CHANGE UP (ref 3.8–5.2)
RBC # FLD: 12.3 % — SIGNIFICANT CHANGE UP (ref 10.3–14.5)
RBC CASTS # UR COMP ASSIST: SIGNIFICANT CHANGE UP (ref 0–?)
RH IG SCN BLD-IMP: POSITIVE — SIGNIFICANT CHANGE UP
SAO2 % BLDV: 46.1 % — LOW (ref 60–85)
SODIUM SERPL-SCNC: 139 MMOL/L — SIGNIFICANT CHANGE UP (ref 135–145)
SP GR SPEC: 1.02 — SIGNIFICANT CHANGE UP (ref 1–1.04)
SQUAMOUS # UR AUTO: SIGNIFICANT CHANGE UP
UROBILINOGEN FLD QL: NORMAL — SIGNIFICANT CHANGE UP
WBC # BLD: 7.08 K/UL — SIGNIFICANT CHANGE UP (ref 3.8–10.5)
WBC # FLD AUTO: 7.08 K/UL — SIGNIFICANT CHANGE UP (ref 3.8–10.5)
WBC UR QL: SIGNIFICANT CHANGE UP (ref 0–?)

## 2018-12-07 PROCEDURE — 99223 1ST HOSP IP/OBS HIGH 75: CPT

## 2018-12-07 PROCEDURE — 74177 CT ABD & PELVIS W/CONTRAST: CPT | Mod: 26

## 2018-12-07 RX ORDER — ACETAMINOPHEN 500 MG
1000 TABLET ORAL ONCE
Qty: 0 | Refills: 0 | Status: COMPLETED | OUTPATIENT
Start: 2018-12-07 | End: 2018-12-07

## 2018-12-07 RX ORDER — ONDANSETRON 8 MG/1
4 TABLET, FILM COATED ORAL ONCE
Qty: 0 | Refills: 0 | Status: COMPLETED | OUTPATIENT
Start: 2018-12-07 | End: 2018-12-07

## 2018-12-07 RX ORDER — SODIUM CHLORIDE 9 MG/ML
1000 INJECTION, SOLUTION INTRAVENOUS
Qty: 0 | Refills: 0 | Status: DISCONTINUED | OUTPATIENT
Start: 2018-12-07 | End: 2018-12-09

## 2018-12-07 RX ORDER — ENOXAPARIN SODIUM 100 MG/ML
40 INJECTION SUBCUTANEOUS DAILY
Qty: 0 | Refills: 0 | Status: DISCONTINUED | OUTPATIENT
Start: 2018-12-07 | End: 2018-12-09

## 2018-12-07 RX ORDER — SODIUM CHLORIDE 9 MG/ML
1000 INJECTION, SOLUTION INTRAVENOUS ONCE
Qty: 0 | Refills: 0 | Status: COMPLETED | OUTPATIENT
Start: 2018-12-07 | End: 2018-12-07

## 2018-12-07 RX ADMIN — SODIUM CHLORIDE 1000 MILLILITER(S): 9 INJECTION, SOLUTION INTRAVENOUS at 01:41

## 2018-12-07 RX ADMIN — Medication 1000 MILLIGRAM(S): at 02:26

## 2018-12-07 RX ADMIN — ONDANSETRON 4 MILLIGRAM(S): 8 TABLET, FILM COATED ORAL at 02:03

## 2018-12-07 RX ADMIN — SODIUM CHLORIDE 100 MILLILITER(S): 9 INJECTION, SOLUTION INTRAVENOUS at 10:44

## 2018-12-07 RX ADMIN — ENOXAPARIN SODIUM 40 MILLIGRAM(S): 100 INJECTION SUBCUTANEOUS at 12:03

## 2018-12-07 RX ADMIN — Medication 1000 MILLIGRAM(S): at 02:33

## 2018-12-07 RX ADMIN — Medication 400 MILLIGRAM(S): at 02:03

## 2018-12-07 RX ADMIN — SODIUM CHLORIDE 1000 MILLILITER(S): 9 INJECTION, SOLUTION INTRAVENOUS at 02:41

## 2018-12-07 NOTE — ED PROVIDER NOTE - PROGRESS NOTE DETAILS
Charles NGUYEN: Labs reassuring. CT showing SBO with transition point in the mid abdomen. D/w surgery who will come see the patient shortly.

## 2018-12-07 NOTE — H&P ADULT - HISTORY OF PRESENT ILLNESS
63F PMH HTN, rectal cancer s/p LAR with loop ileostomy (8/2017) s/p loop ileostomy reversal (12/2017) c/b SBO (12/2017), now presenting with abdominal pain. Patient states that she was in her usual state of health until yesterday afternoon, when she began to develop lower abdominal pain and nausea, similar to the symptoms she had with her previous SBO. Denies emesis. Last had a BM yesterday afternoon prior to onset of symptoms, which was smaller in caliber than usual. Had not been passing flatus since pain started, however pt endorses having had a small amount of flatus this morning. Denies fevers/chills. Denies SOB.    Upon arrival to [facility] ED, AVSS. WBC 7.08. Lactate 1.1. CT abd/pelvis obtained, which showed SBO with possible transition point in the midpelvis.

## 2018-12-07 NOTE — CONSULT NOTE ADULT - SUBJECTIVE AND OBJECTIVE BOX
Vital Signs Last 24 Hrs  T(C): 36.7 (07 Dec 2018 05:25), Max: 36.9 (06 Dec 2018 23:22)  T(F): 98.1 (07 Dec 2018 05:25), Max: 98.4 (06 Dec 2018 23:22)  HR: 59 (07 Dec 2018 05:25) (59 - 69)  BP: 137/64 (07 Dec 2018 05:25) (137/64 - 159/65)  BP(mean): --  RR: 18 (07 Dec 2018 05:25) (18 - 20)  SpO2: 100% (07 Dec 2018 05:25) (97% - 100%)    I&O's Detail                            13.2   7.08  )-----------( 222      ( 07 Dec 2018 01:00 )             39.5       12-07    139  |  98  |  16  ----------------------------<  123<H>  4.1   |  30  |  0.66    Ca    9.7      07 Dec 2018 01:00    TPro  7.3  /  Alb  4.3  /  TBili  0.5  /  DBili  x   /  AST  21  /  ALT  14  /  AlkPhos  43  12-07      PT/INR - ( 07 Dec 2018 01:00 )   PT: 10.2 SEC;   INR: 0.90          PTT - ( 07 Dec 2018 01:00 )  PTT:29.6 SEC    CT shows alot of stool in the colon and mild partial SBO  Abdomen non-tender    PLAN:  NPO  Possibly start clear liquids later today

## 2018-12-07 NOTE — ED PROVIDER NOTE - OBJECTIVE STATEMENT
63F w/ hx of colorectal CA s/p surgery in the past with prev hx of SBOs with previous ileostomy, now s/p reversal in 2017 presents today with diffuse abdominal pain, nausea c/w previous SBO. Pt has not had a BM or passed flatus today. PT with generalized abdominal pain not localizing. NO aggregating or alleviating symptoms. Denies fevers, chills, chest pain, SOB. Has not vomited. Has not tried to eat anything for most of the day. Not on active chemo. NO known sick contacts.     Surgeon: Dr. Oscar De La O/onc: Dr. Fabiano Jones

## 2018-12-07 NOTE — ED ADULT NURSE NOTE - CHIEF COMPLAINT QUOTE
Pt with a pmhx of colo-rectal CA requiring surgeries and hx of sbo's presents with c/o abd pain. States she is not passing flatus. Pt kneeling over on the floor due to pain.

## 2018-12-07 NOTE — H&P ADULT - NSHPLABSRESULTS_GEN_ALL_CORE
Complete Blood Count + Automated Diff (12.07.18 @ 01:00)    Nucleated RBC #: 0    WBC Count: 7.08 K/uL    RBC Count: 4.23 M/uL    Hemoglobin: 13.2 g/dL    Hematocrit: 39.5 %    Mean Cell Volume: 93.4 fL    Mean Cell Hemoglobin: 31.2 pg    Mean Cell Hemoglobin Conc: 33.4 %    Red Cell Distrib Width: 12.3 %    Platelet Count - Automated: 222 K/uL    Comprehensive Metabolic Panel (12.07.18 @ 01:00)    Sodium, Serum: 139 mmol/L    Potassium, Serum: 4.1 mmol/L    Chloride, Serum: 98: Delta: 104 on 12/24/  Delta: 104 on 12/24/ mmol/L    Carbon Dioxide, Serum: 30 mmol/L    Blood Urea Nitrogen, Serum: 16 mg/dL    Creatinine, Serum: 0.66 mg/dL    Glucose, Serum: 123 mg/dL    Calcium, Total Serum: 9.7 mg/dL    Protein Total, Serum: 7.3 g/dL    Albumin, Serum: 4.3 g/dL    Bilirubin Total, Serum: 0.5 mg/dL    Alkaline Phosphatase, Serum: 43 u/L    Aspartate Aminotransferase (AST/SGOT): 21 u/L    Alanine Aminotransferase (ALT/SGPT): 14 u/L    Blood Gas Venous Comprehensive (12.07.18 @ 01:00)    Blood Gas Venous - Lactate: 1.1: Please note updated reference range. mmol/L    Blood Gas Venous - Chloride: 98 mmol/L    Blood Gas Venous - Creatinine: 0.63 mg/dL    pH, Venous: 7.39 pH    pCO2, Venous: 58 mmHg    pO2, Venous: 28 mmHg    HCO3, Venous: 30 mmol/L    Base Excess, Venous: 8.6: REFERENCE RANGE = -3 + 2 mmol/L mmol/L    Oxygen Saturation, Venous: 46.1 %    Blood Gas Venous - Sodium: 134 mmol/L    Blood Gas Venous - Potassium: Test not performed SPECIMEN GROSSLY HEMOLYZED.  REVIEW RESULT ON BMP. mmol/L    Blood Gas Venous - Glucose: 133    Blood Gas Venous - Hemoglobin: 13.8 g/dL    Blood Gas Venous - Hematocrit: 42.4 %    Urinalysis (12.07.18 @ 02:34)    Color: YELLOW    Urine Appearance: TURBID    Glucose: NEGATIVE    Bilirubin: NEGATIVE    Ketone - Urine: NEGATIVE    Specific Gravity: 1.021    Blood: NEGATIVE    pH - Urine: 8.5    Protein, Urine: 30    Urobilinogen: NORMAL    Nitrite: NEGATIVE    Leukocyte Esterase Concentration: NEGATIVE    Red Blood Cell - Urine: 0-2    White Blood Cell - Urine: 0-2    Hyaline Casts: NEGATIVE    Bacteria: NEGATIVE    Squamous Epithelial: OCC    CT Abdomen and Pelvis w/ IV Cont (12.07.18 @ 05:04)    LOWER CHEST: Within normal limits.    LIVER: Within normal limits.  BILE DUCTS: Normal caliber.  GALLBLADDER: Within normal limits.  SPLEEN: Within normal limits.  PANCREAS: Within normal limits.  ADRENALS: Within normal limits.  KIDNEYS/URETERS: Within normal limits.    BLADDER: Within normal limits.  REPRODUCTIVE ORGANS: No uterine or adnexal masses.    BOWEL: Right hemiabdomen small bowel anastomosis and lower rectal   anastomosis. Multiple dilated loops of small bowel are identified   predominantly in the right hemipelvis region with a suspected transition   point in the pelvis. Appendix is normal.  PERITONEUM: No ascites.  VESSELS:  Within normal limits.  RETROPERITONEUM: No lymphadenopathy.    ABDOMINAL WALL: Within normal limits.  BONES: Within normal limits.    IMPRESSION:     Small bowel obstruction with a suspected transition point in the mid   pelvis.

## 2018-12-07 NOTE — ED PROVIDER NOTE - MEDICAL DECISION MAKING DETAILS
63F w/ hx HTN, prev colorectal ca s/p resection with ileostomy prev c/b SBP now s/p reversal presents with abd distension, pain, nausea, belching, no flatus concerning for new SBO. Will obtain basic and belly labs, CT scan and poss surg consult.

## 2018-12-07 NOTE — ED ADULT NURSE REASSESSMENT NOTE - NS ED NURSE REASSESS COMMENT FT1
Pt received from night RN. VS as noted. Pt still c/o slight abd pain but states some relief. Pt states this morning passed some flatus and had one small BM. Will continue to monitor.

## 2018-12-07 NOTE — ED PROVIDER NOTE - PHYSICAL EXAMINATION
General: WN/WD NAD  HEENT: PERRLA, EOMI, moist mucous membranes  Neurology: A&Ox3, nonfocal, GILLESPIE x 4  Respiratory: CTA B/L, normal respiratory effort, no wheezes, crackles, rales  CV: RRR, S1S2, no murmurs, rubs or gallops  Abdominal: Soft, TTP diffusely, distended, dec BS  Extremities: No edema, + peripheral pulses  Incisions: Well healed abdominal incisions  Tubes: None

## 2018-12-07 NOTE — ED ADULT NURSE NOTE - OBJECTIVE STATEMENT
float rn-pt received spot 4, alert and orientedx3. c.o abdominal pain and nausea today with decreased flatus. no active vomiting noted. denies cp sob dizziness. respirations even unlabored. 20G iv placed left ac, labs sent. rpt given to primary rn evangelista.

## 2018-12-07 NOTE — ED PROVIDER NOTE - ATTENDING CONTRIBUTION TO CARE
Afebrile. Awake and Alert. Lungs CTA. Heart RRR. Abdomen soft, ND, mild diffuse non-focal TTP. CN II-XII grossly intact. Moves all extremities without lateralization. CT a/p r/o SBO given hx.

## 2018-12-07 NOTE — ED ADULT NURSE REASSESSMENT NOTE - NS ED NURSE REASSESS COMMENT FT1
Pt has had two small, thin BM today. States she has the urge to go but is only going small amounts. Endorses slight abd discomfort as a result of this. Will continue to monitor.

## 2018-12-07 NOTE — H&P ADULT - NSHPPHYSICALEXAM_GEN_ALL_CORE
Gen: Laying in bed, in NAD  HEENT: Normocephalic, atraumatic. MMM  Resp: CTAB, no w/r/r  CV: RRR  Abd: Soft, nondistended. TTP over lower abdomen. Well healed midline and ostomy scar  Ext: No C/C/E

## 2018-12-07 NOTE — H&P ADULT - ASSESSMENT
63F PMH HTN, rectal cancer s/p LAR with loop ileostomy (8/2017) s/p loop ileostomy reversal (12/2017) c/b SBO (12/2017), now presenting with abdominal pain, found on imaging to have SBO with possible transition point in the midpelvis. WBC 7.08. Lactate 1.1.    - Admit to D team surgery  - NPO/IVF for now, will consider advancing to CLD in the PM  - As pt no longer endorsing nausea, will hold off on NGT unless pt starts to develop nausea and emesis  - No standing pain medication  - Serial abdominal exams  - Monitor GI function  - Discussed with attending Dr. Oscar Cid PGY2  D team surgery  [id number]

## 2018-12-07 NOTE — ED ADULT NURSE NOTE - NSIMPLEMENTINTERV_GEN_ALL_ED
Implemented All Universal Safety Interventions:  [place] to call system. Call bell, personal items and telephone within reach. Instruct patient to call for assistance. Room bathroom lighting operational. Non-slip footwear when patient is off stretcher. Physically safe environment: no spills, clutter or unnecessary equipment. Stretcher in lowest position, wheels locked, appropriate side rails in place.

## 2018-12-08 ENCOUNTER — TRANSCRIPTION ENCOUNTER (OUTPATIENT)
Age: 63
End: 2018-12-08

## 2018-12-08 LAB
BACTERIA UR CULT: SIGNIFICANT CHANGE UP
BUN SERPL-MCNC: 8 MG/DL — SIGNIFICANT CHANGE UP (ref 7–23)
CALCIUM SERPL-MCNC: 8.7 MG/DL — SIGNIFICANT CHANGE UP (ref 8.4–10.5)
CHLORIDE SERPL-SCNC: 105 MMOL/L — SIGNIFICANT CHANGE UP (ref 98–107)
CO2 SERPL-SCNC: 28 MMOL/L — SIGNIFICANT CHANGE UP (ref 22–31)
CREAT SERPL-MCNC: 0.58 MG/DL — SIGNIFICANT CHANGE UP (ref 0.5–1.3)
GLUCOSE SERPL-MCNC: 95 MG/DL — SIGNIFICANT CHANGE UP (ref 70–99)
HCT VFR BLD CALC: 37.5 % — SIGNIFICANT CHANGE UP (ref 34.5–45)
HGB BLD-MCNC: 12.5 G/DL — SIGNIFICANT CHANGE UP (ref 11.5–15.5)
MAGNESIUM SERPL-MCNC: 2.1 MG/DL — SIGNIFICANT CHANGE UP (ref 1.6–2.6)
MCHC RBC-ENTMCNC: 31.6 PG — SIGNIFICANT CHANGE UP (ref 27–34)
MCHC RBC-ENTMCNC: 33.3 % — SIGNIFICANT CHANGE UP (ref 32–36)
MCV RBC AUTO: 94.9 FL — SIGNIFICANT CHANGE UP (ref 80–100)
NRBC # FLD: 0 — SIGNIFICANT CHANGE UP
PHOSPHATE SERPL-MCNC: 2.8 MG/DL — SIGNIFICANT CHANGE UP (ref 2.5–4.5)
PLATELET # BLD AUTO: 199 K/UL — SIGNIFICANT CHANGE UP (ref 150–400)
PMV BLD: 8.5 FL — SIGNIFICANT CHANGE UP (ref 7–13)
POTASSIUM SERPL-MCNC: 3.9 MMOL/L — SIGNIFICANT CHANGE UP (ref 3.5–5.3)
POTASSIUM SERPL-SCNC: 3.9 MMOL/L — SIGNIFICANT CHANGE UP (ref 3.5–5.3)
RBC # BLD: 3.95 M/UL — SIGNIFICANT CHANGE UP (ref 3.8–5.2)
RBC # FLD: 12.4 % — SIGNIFICANT CHANGE UP (ref 10.3–14.5)
SODIUM SERPL-SCNC: 140 MMOL/L — SIGNIFICANT CHANGE UP (ref 135–145)
SPECIMEN SOURCE: SIGNIFICANT CHANGE UP
WBC # BLD: 2.67 K/UL — LOW (ref 3.8–10.5)
WBC # FLD AUTO: 2.67 K/UL — LOW (ref 3.8–10.5)

## 2018-12-08 PROCEDURE — 99232 SBSQ HOSP IP/OBS MODERATE 35: CPT

## 2018-12-08 RX ADMIN — SODIUM CHLORIDE 100 MILLILITER(S): 9 INJECTION, SOLUTION INTRAVENOUS at 06:16

## 2018-12-08 RX ADMIN — ENOXAPARIN SODIUM 40 MILLIGRAM(S): 100 INJECTION SUBCUTANEOUS at 12:35

## 2018-12-08 NOTE — DISCHARGE NOTE ADULT - PATIENT PORTAL LINK FT
You can access the [facility] Patient Portal, offered by [facility], by registering with the following website: [email or web address]

## 2018-12-08 NOTE — ED ADULT NURSE REASSESSMENT NOTE - NS ED NURSE REASSESS COMMENT FT1
pt ambulated to restroom, had one very small BM. pt states she feels a heaviness in her lower abdomen and feels like she has to use the restroom again. vitals as noted. comfort measures provided. IV fluids infusing per MD order.

## 2018-12-08 NOTE — DISCHARGE NOTE ADULT - CARE PLAN
Principal Discharge DX:	Small bowel obstruction  Goal:	Improvement of symptoms  Assessment and plan of treatment:	Please follow up with Dr. Moore in 1-2 weeks.   If you continue to have pain, persistent vomiting, become febrile or ill, please call Dr. Moore's office.

## 2018-12-08 NOTE — DISCHARGE NOTE ADULT - HOSPITAL COURSE
63F PMH HTN, rectal cancer s/p LAR with loop ileostomy (8/2017) s/p loop ileostomy reversal (12/2017) c/b SBO (12/2017), now presenting with abdominal pain. Patient states that she was in her usual state of health prior to admission when she began to develop lower abdominal pain and nausea, similar to the symptoms she had with her previous SBO. Denies emesis. Had not been passing flatus since pain started, however pt endorses having had a small amount of flatus on day of admission Denies fevers/chills. Denies SOB.    Upon arrival to [facility] ED, AVSS. WBC 7.08. Lactate 1.1. CT abd/pelvis obtained, which showed SBO with possible transition point in the midpelvis.    She admitted for serial abdominal exams. On the day after admission she passed multiple small bowel movements and had continued flatus. She was started on a regular diet, which she tolerated well without nausea or vomiting. She was subsequently discharged home without additional medications and with instructions to follow up with Dr. Moore in 1-2 weeks.

## 2018-12-08 NOTE — DISCHARGE NOTE ADULT - CARE PROVIDER_API CALL
Kory Moore (MD), Surgery  [address] [place] [address]  Phone: [phone]  Fax: [phone]

## 2018-12-08 NOTE — DISCHARGE NOTE ADULT - PLAN OF CARE
Improvement of symptoms Please follow up with Dr. Moore in 1-2 weeks.   If you continue to have pain, persistent vomiting, become febrile or ill, please call Dr. Moore's office.

## 2018-12-08 NOTE — DISCHARGE NOTE ADULT - INSTRUCTIONS
DIET: You may resume your regular diet. Call MD with any signs of infection ie. fever/shaking chills, or persistent nausea or vomitting, or with increased unrelieved pain. Continue to drink plenty of fluids and avoid straining as well as constipation. Follow-up with MD in office as well as with PMD as advised for continuity of care.

## 2018-12-08 NOTE — PROGRESS NOTE ADULT - SUBJECTIVE AND OBJECTIVE BOX
GENERAL SURGERY DAILY PROGRESS NOTE:       Subjective:  Pain controlled. Denies N/V. Tolerating CL diet. No flatus or BM.        Objective:    PE:  Gen: Laying in bed, in NAD  HEENT: Normocephalic, atraumatic. MMM  Resp: CTAB, no w/r/r  CV: RRR  Abd: Soft, nondistended. TTP over lower abdomen. Well healed midline and ostomy scar  Ext: No C/C/E    Vital Signs Last 24 Hrs  T(C): 36.2 (08 Dec 2018 00:57), Max: 36.8 (07 Dec 2018 12:57)  T(F): 97.1 (08 Dec 2018 00:57), Max: 98.2 (07 Dec 2018 12:57)  HR: 56 (08 Dec 2018 00:57) (56 - 60)  BP: 124/67 (08 Dec 2018 00:57) (124/67 - 137/64)  BP(mean): --  RR: 16 (08 Dec 2018 00:57) (16 - 18)  SpO2: 100% (08 Dec 2018 00:57) (99% - 100%)    I&O's Detail      Daily     Daily     MEDICATIONS  (STANDING):  dextrose 5% + sodium chloride 0.45%. 1000 milliLiter(s) (100 mL/Hr) IV Continuous <Continuous>  enoxaparin Injectable 40 milliGRAM(s) SubCutaneous daily    MEDICATIONS  (PRN):      LABS:                        13.2   7.08  )-----------( 222      ( 07 Dec 2018 01:00 )             39.5     12-07    139  |  98  |  16  ----------------------------<  123<H>  4.1   |  30  |  0.66    Ca    9.7      07 Dec 2018 01:00    TPro  7.3  /  Alb  4.3  /  TBili  0.5  /  DBili  x   /  AST  21  /  ALT  14  /  AlkPhos  43  12-07    PT/INR - ( 07 Dec 2018 01:00 )   PT: 10.2 SEC;   INR: 0.90          PTT - ( 07 Dec 2018 01:00 )  PTT:29.6 SEC  Urinalysis Basic - ( 07 Dec 2018 02:34 )    Color: YELLOW / Appearance: TURBID / S.021 / pH: 8.5  Gluc: NEGATIVE / Ketone: NEGATIVE  / Bili: NEGATIVE / Urobili: NORMAL   Blood: NEGATIVE / Protein: 30 / Nitrite: NEGATIVE   Leuk Esterase: NEGATIVE / RBC: 0-2 / WBC 0-2   Sq Epi: OCC / Non Sq Epi: x / Bacteria: NEGATIVE        RADIOLOGY & ADDITIONAL STUDIES: GENERAL SURGERY DAILY PROGRESS NOTE:       Subjective:  Patient seen and examined with Dr. Gusman. Pain controlled. Denies N/V. Tolerating CL diet. Had BM overnight.         Objective:    PE:  Gen: Laying in bed, in NAD  HEENT: Normocephalic, atraumatic. MMM  Resp: CTAB, no w/r/r  CV: RRR  Abd: Soft, nontender nondistended. Well healed midline and ostomy scar  Ext: No C/C/E    Vital Signs Last 24 Hrs  T(C): 36.2 (08 Dec 2018 00:57), Max: 36.8 (07 Dec 2018 12:57)  T(F): 97.1 (08 Dec 2018 00:57), Max: 98.2 (07 Dec 2018 12:57)  HR: 56 (08 Dec 2018 00:57) (56 - 60)  BP: 124/67 (08 Dec 2018 00:57) (124/67 - 137/64)  BP(mean): --  RR: 16 (08 Dec 2018 00:57) (16 - 18)  SpO2: 100% (08 Dec 2018 00:57) (99% - 100%)    I&O's Detail      Daily     Daily     MEDICATIONS  (STANDING):  dextrose 5% + sodium chloride 0.45%. 1000 milliLiter(s) (100 mL/Hr) IV Continuous <Continuous>  enoxaparin Injectable 40 milliGRAM(s) SubCutaneous daily    MEDICATIONS  (PRN):      LABS:                        13.2   7.08  )-----------( 222      ( 07 Dec 2018 01:00 )             39.5     12-07    139  |  98  |  16  ----------------------------<  123<H>  4.1   |  30  |  0.66    Ca    9.7      07 Dec 2018 01:00    TPro  7.3  /  Alb  4.3  /  TBili  0.5  /  DBili  x   /  AST  21  /  ALT  14  /  AlkPhos  43  12-07    PT/INR - ( 07 Dec 2018 01:00 )   PT: 10.2 SEC;   INR: 0.90          PTT - ( 07 Dec 2018 01:00 )  PTT:29.6 SEC  Urinalysis Basic - ( 07 Dec 2018 02:34 )    Color: YELLOW / Appearance: TURBID / S.021 / pH: 8.5  Gluc: NEGATIVE / Ketone: NEGATIVE  / Bili: NEGATIVE / Urobili: NORMAL   Blood: NEGATIVE / Protein: 30 / Nitrite: NEGATIVE   Leuk Esterase: NEGATIVE / RBC: 0-2 / WBC 0-2   Sq Epi: OCC / Non Sq Epi: x / Bacteria: NEGATIVE        RADIOLOGY & ADDITIONAL STUDIES:

## 2018-12-08 NOTE — ED ADULT NURSE REASSESSMENT NOTE - NS ED NURSE REASSESS COMMENT FT1
report given to [facility] RN, pt brought to [facility], pt resting, appears comfortable. call bell placed within reach.

## 2018-12-08 NOTE — PROGRESS NOTE ADULT - ASSESSMENT
63F PMH HTN, rectal cancer s/p LAR with loop ileostomy (8/2017) s/p loop ileostomy reversal (12/2017) c/b SBO (12/2017), now presenting with abdominal pain, found on imaging to have SBO with possible transition point in the midpelvis. WBC 7.08. Lactate 1.1.    - CLD   - No standing pain medication  - Serial abdominal exams  - Monitor GI function  - Discussed with attending Dr. Oscar AMARO team surgery  [id number] 63F PMH HTN, rectal cancer s/p LAR with loop ileostomy (8/2017) s/p loop ileostomy reversal (12/2017) c/b SBO (12/2017), now presenting with abdominal pain, found on imaging to have SBO with possible transition point in the midpelvis. WBC 7.08. Lactate 1.1.    - CLD, ADAT to regular as tolerated.   - No standing pain medication  - Serial abdominal exams  - Monitor GI function  - Dispo: likely home today or tomorrow.     D team surgery  [id number]

## 2018-12-08 NOTE — DISCHARGE NOTE ADULT - MEDICATION SUMMARY - MEDICATIONS TO TAKE
I will START or STAY ON the medications listed below when I get home from the hospital:    acetaminophen 325 mg oral tablet  -- 2 tab(s) by mouth every 6 hours  -- Indication: For pain    Vitamin D3  -- 1 tab(s) by mouth once a day in am  -- Indication: For vitamin    Vitamin C  -- 1 tab(s) by mouth once a day in pm  -- Indication: For vitamin

## 2018-12-09 VITALS
OXYGEN SATURATION: 100 % | RESPIRATION RATE: 14 BRPM | DIASTOLIC BLOOD PRESSURE: 51 MMHG | TEMPERATURE: 98 F | SYSTOLIC BLOOD PRESSURE: 120 MMHG | HEART RATE: 59 BPM

## 2018-12-09 PROCEDURE — 99232 SBSQ HOSP IP/OBS MODERATE 35: CPT

## 2018-12-09 NOTE — PROGRESS NOTE ADULT - SUBJECTIVE AND OBJECTIVE BOX
Surgery Progress Note     Subjective/24hour Events:   Patient seen and examined with Dr. Gusman.   No acute events overnight.   Pain well controlled.   Tolerating regular diet.   Passing flatus, having bowel movements.     Vital Signs:  Vital Signs Last 24 Hrs  T(C): 36.6 (09 Dec 2018 04:54), Max: 37.1 (08 Dec 2018 17:14)  T(F): 97.9 (09 Dec 2018 04:54), Max: 98.8 (08 Dec 2018 17:14)  HR: 59 (09 Dec 2018 04:54) (55 - 98)  BP: 120/51 (09 Dec 2018 04:54) (100/52 - 128/59)  BP(mean): --  RR: 14 (09 Dec 2018 04:54) (14 - 16)  SpO2: 100% (09 Dec 2018 04:54) (98% - 100%)    CAPILLARY BLOOD GLUCOSE          I&O's Detail    08 Dec 2018 07:01  -  09 Dec 2018 07:00  --------------------------------------------------------  IN:  Total IN: 0 mL    OUT:    Voided: 1075 mL  Total OUT: 1075 mL    Total NET: -1075 mL          MEDICATIONS  (STANDING):  dextrose 5% + sodium chloride 0.45%. 1000 milliLiter(s) (100 mL/Hr) IV Continuous <Continuous>  enoxaparin Injectable 40 milliGRAM(s) SubCutaneous daily    MEDICATIONS  (PRN):      Physical Exam:  Gen: NAD, laying comfortably in bed, converses easily.   Lungs: Non labored breathing.   Ab: Soft, nontender, nondistended.   Ext: Moves all 4 spontaneously.     Labs:    12-08    140  |  105  |  8   ----------------------------<  95  3.9   |  28  |  0.58    Ca    8.7      08 Dec 2018 06:50  Phos  2.8     12-08  Mg     2.1     12-08                              12.5   2.67  )-----------( 199      ( 08 Dec 2018 06:50 )             37.5

## 2018-12-09 NOTE — PROGRESS NOTE ADULT - ASSESSMENT
63F PMH HTN, rectal cancer s/p LAR with loop ileostomy (8/2017) s/p loop ileostomy reversal (12/2017) c/b SBO (12/2017), now presenting with abdominal pain, found on imaging to have SBO with possible transition point in the midpelvis. WBC 7.08. Lactate 1.1.    - Regular diet as tolerated.   - Serial abdominal exams  - Monitor GI function  - Dispo: home    D team surgery  [id number]

## 2018-12-31 ENCOUNTER — APPOINTMENT (OUTPATIENT)
Dept: SURGICAL ONCOLOGY | Facility: CLINIC | Age: 63
End: 2018-12-31
Payer: MEDICAID

## 2018-12-31 VITALS
DIASTOLIC BLOOD PRESSURE: 79 MMHG | HEART RATE: 61 BPM | WEIGHT: 108 LBS | OXYGEN SATURATION: 99 % | HEIGHT: 63 IN | SYSTOLIC BLOOD PRESSURE: 123 MMHG | BODY MASS INDEX: 19.14 KG/M2

## 2018-12-31 PROCEDURE — 99214 OFFICE O/P EST MOD 30 MIN: CPT

## 2019-01-07 ENCOUNTER — OUTPATIENT (OUTPATIENT)
Dept: OUTPATIENT SERVICES | Facility: HOSPITAL | Age: 64
LOS: 1 days | End: 2019-01-07
Payer: MEDICAID

## 2019-01-07 VITALS
RESPIRATION RATE: 20 BRPM | WEIGHT: 106.92 LBS | DIASTOLIC BLOOD PRESSURE: 74 MMHG | SYSTOLIC BLOOD PRESSURE: 120 MMHG | HEART RATE: 63 BPM | OXYGEN SATURATION: 97 % | TEMPERATURE: 98 F | HEIGHT: 62 IN

## 2019-01-07 DIAGNOSIS — C20 MALIGNANT NEOPLASM OF RECTUM: ICD-10-CM

## 2019-01-07 DIAGNOSIS — Z90.49 ACQUIRED ABSENCE OF OTHER SPECIFIED PARTS OF DIGESTIVE TRACT: Chronic | ICD-10-CM

## 2019-01-07 DIAGNOSIS — I10 ESSENTIAL (PRIMARY) HYPERTENSION: ICD-10-CM

## 2019-01-07 DIAGNOSIS — C19 MALIGNANT NEOPLASM OF RECTOSIGMOID JUNCTION: ICD-10-CM

## 2019-01-07 DIAGNOSIS — Z01.818 ENCOUNTER FOR OTHER PREPROCEDURAL EXAMINATION: ICD-10-CM

## 2019-01-07 DIAGNOSIS — Z98.890 OTHER SPECIFIED POSTPROCEDURAL STATES: Chronic | ICD-10-CM

## 2019-01-07 LAB
ANION GAP SERPL CALC-SCNC: 12 MMOL/L — SIGNIFICANT CHANGE UP (ref 5–17)
BUN SERPL-MCNC: 11 MG/DL — SIGNIFICANT CHANGE UP (ref 7–23)
CALCIUM SERPL-MCNC: 9.4 MG/DL — SIGNIFICANT CHANGE UP (ref 8.4–10.5)
CHLORIDE SERPL-SCNC: 101 MMOL/L — SIGNIFICANT CHANGE UP (ref 96–108)
CO2 SERPL-SCNC: 27 MMOL/L — SIGNIFICANT CHANGE UP (ref 22–31)
CREAT SERPL-MCNC: 0.56 MG/DL — SIGNIFICANT CHANGE UP (ref 0.5–1.3)
GLUCOSE SERPL-MCNC: 93 MG/DL — SIGNIFICANT CHANGE UP (ref 70–99)
HCT VFR BLD CALC: 38.7 % — SIGNIFICANT CHANGE UP (ref 34.5–45)
HGB BLD-MCNC: 12.4 G/DL — SIGNIFICANT CHANGE UP (ref 11.5–15.5)
MCHC RBC-ENTMCNC: 30.5 PG — SIGNIFICANT CHANGE UP (ref 27–34)
MCHC RBC-ENTMCNC: 32 GM/DL — SIGNIFICANT CHANGE UP (ref 32–36)
MCV RBC AUTO: 95.1 FL — SIGNIFICANT CHANGE UP (ref 80–100)
PLATELET # BLD AUTO: 269 K/UL — SIGNIFICANT CHANGE UP (ref 150–400)
POTASSIUM SERPL-MCNC: 4.1 MMOL/L — SIGNIFICANT CHANGE UP (ref 3.5–5.3)
POTASSIUM SERPL-SCNC: 4.1 MMOL/L — SIGNIFICANT CHANGE UP (ref 3.5–5.3)
RBC # BLD: 4.07 M/UL — SIGNIFICANT CHANGE UP (ref 3.8–5.2)
RBC # FLD: 12.6 % — SIGNIFICANT CHANGE UP (ref 10.3–14.5)
SODIUM SERPL-SCNC: 140 MMOL/L — SIGNIFICANT CHANGE UP (ref 135–145)
WBC # BLD: 3.45 K/UL — LOW (ref 3.8–10.5)
WBC # FLD AUTO: 3.45 K/UL — LOW (ref 3.8–10.5)

## 2019-01-07 PROCEDURE — 80048 BASIC METABOLIC PNL TOTAL CA: CPT

## 2019-01-07 PROCEDURE — G0463: CPT

## 2019-01-07 PROCEDURE — 85027 COMPLETE CBC AUTOMATED: CPT

## 2019-01-07 RX ORDER — SODIUM CHLORIDE 9 MG/ML
3 INJECTION INTRAMUSCULAR; INTRAVENOUS; SUBCUTANEOUS EVERY 8 HOURS
Qty: 0 | Refills: 0 | Status: DISCONTINUED | OUTPATIENT
Start: 2019-01-09 | End: 2019-01-24

## 2019-01-07 RX ORDER — LIDOCAINE HCL 20 MG/ML
0.2 VIAL (ML) INJECTION ONCE
Qty: 0 | Refills: 0 | Status: DISCONTINUED | OUTPATIENT
Start: 2019-01-09 | End: 2019-01-24

## 2019-01-07 RX ORDER — CHOLECALCIFEROL (VITAMIN D3) 125 MCG
1 CAPSULE ORAL
Qty: 0 | Refills: 0 | COMMUNITY

## 2019-01-07 RX ORDER — ASCORBIC ACID 60 MG
1 TABLET,CHEWABLE ORAL
Qty: 0 | Refills: 0 | COMMUNITY

## 2019-01-07 NOTE — H&P PST ADULT - PSH
H/O ileostomy  reversal 12/2017  History of low anterior resection of rectum  Surgeon Dr Moore performed 8/4/17

## 2019-01-07 NOTE — H&P PST ADULT - NSANTHOSAYNRD_GEN_A_CORE
never tested/No. BRISA screening performed.  STOP BANG Legend: 0-2 = LOW Risk; 3-4 = INTERMEDIATE Risk; 5-8 = HIGH Risk

## 2019-01-07 NOTE — H&P PST ADULT - HISTORY OF PRESENT ILLNESS
63 yr old female with history of Adenocarcinoma of rectum chemo prior to surgery  - s/p LAR with loop ileostomy (2017), reversal (12/2017), with h/o SBO several times - resolved last in Dec 2018, 63 yr old female with history of Adenocarcinoma of rectum chemo prior to surgery  - s/p LAR with loop ileostomy (2017), reversal (12/2017), with h/o SBO several times - resolved last in Dec 2018, Now coming in for Rigid sigmoidoscopy with dilation, Lithotomy on 1/9/19.

## 2019-01-07 NOTE — H&P PST ADULT - PMH
Colorectal cancer    HTN (hypertension)  No meds  SBO (small bowel obstruction)  multiple- last 12/2018 - resolved  Thyroid disorder  h/o Thyroid disorder- no issues / meds

## 2019-01-07 NOTE — H&P PST ADULT - RS GEN PE MLT RESP DETAILS PC
breath sounds equal/normal/airway patent/clear to auscultation bilaterally/good air movement/respirations non-labored

## 2019-01-08 ENCOUNTER — TRANSCRIPTION ENCOUNTER (OUTPATIENT)
Age: 64
End: 2019-01-08

## 2019-01-08 PROBLEM — E07.9 DISORDER OF THYROID, UNSPECIFIED: Chronic | Status: ACTIVE | Noted: 2017-07-20

## 2019-01-09 ENCOUNTER — APPOINTMENT (OUTPATIENT)
Dept: SURGICAL ONCOLOGY | Facility: HOSPITAL | Age: 64
End: 2019-01-09

## 2019-01-09 ENCOUNTER — OUTPATIENT (OUTPATIENT)
Dept: OUTPATIENT SERVICES | Facility: HOSPITAL | Age: 64
LOS: 1 days | End: 2019-01-09
Payer: MEDICAID

## 2019-01-09 VITALS
HEART RATE: 73 BPM | OXYGEN SATURATION: 98 % | WEIGHT: 106.92 LBS | HEIGHT: 62 IN | TEMPERATURE: 98 F | DIASTOLIC BLOOD PRESSURE: 69 MMHG | RESPIRATION RATE: 18 BRPM | SYSTOLIC BLOOD PRESSURE: 104 MMHG

## 2019-01-09 VITALS
OXYGEN SATURATION: 99 % | SYSTOLIC BLOOD PRESSURE: 127 MMHG | HEART RATE: 64 BPM | DIASTOLIC BLOOD PRESSURE: 63 MMHG | RESPIRATION RATE: 17 BRPM | TEMPERATURE: 99 F

## 2019-01-09 DIAGNOSIS — Z90.49 ACQUIRED ABSENCE OF OTHER SPECIFIED PARTS OF DIGESTIVE TRACT: Chronic | ICD-10-CM

## 2019-01-09 DIAGNOSIS — C20 MALIGNANT NEOPLASM OF RECTUM: ICD-10-CM

## 2019-01-09 DIAGNOSIS — Z98.890 OTHER SPECIFIED POSTPROCEDURAL STATES: Chronic | ICD-10-CM

## 2019-01-09 PROCEDURE — 45303 PROCTOSIGMOIDOSCOPY DILATE: CPT

## 2019-01-09 RX ORDER — CELECOXIB 200 MG/1
200 CAPSULE ORAL ONCE
Qty: 0 | Refills: 0 | Status: COMPLETED | OUTPATIENT
Start: 2019-01-09 | End: 2019-01-09

## 2019-01-09 RX ORDER — ACETAMINOPHEN 500 MG
1000 TABLET ORAL ONCE
Qty: 0 | Refills: 0 | Status: COMPLETED | OUTPATIENT
Start: 2019-01-09 | End: 2019-01-09

## 2019-01-09 NOTE — ASU DISCHARGE PLAN (ADULT/PEDIATRIC). - NOTIFY
Increased Irritability or Sluggishness/Numbness, color, or temperature change to extremity/Persistent Nausea and Vomiting/Swelling that continues/Unable to Urinate/Pain not relieved by Medications/GYN Fever>100.4/Numbness, tingling/Excessive Diarrhea/Inability to Tolerate Liquids or Foods/Bleeding that does not stop/Fever greater than 101

## 2019-01-09 NOTE — BRIEF OPERATIVE NOTE - PROCEDURE
<<-----Click on this checkbox to enter Procedure Rigid sigmoidoscopy with dilation  01/09/2019    Active  CHEPE

## 2019-01-10 PROBLEM — K56.609 UNSPECIFIED INTESTINAL OBSTRUCTION, UNSPECIFIED AS TO PARTIAL VERSUS COMPLETE OBSTRUCTION: Chronic | Status: ACTIVE | Noted: 2019-01-07

## 2019-01-15 ENCOUNTER — FORM ENCOUNTER (OUTPATIENT)
Age: 64
End: 2019-01-15

## 2019-01-16 ENCOUNTER — OUTPATIENT (OUTPATIENT)
Dept: OUTPATIENT SERVICES | Facility: HOSPITAL | Age: 64
LOS: 1 days | End: 2019-01-16
Payer: MEDICAID

## 2019-01-16 ENCOUNTER — APPOINTMENT (OUTPATIENT)
Dept: CT IMAGING | Facility: IMAGING CENTER | Age: 64
End: 2019-01-16
Payer: MEDICAID

## 2019-01-16 DIAGNOSIS — C20 MALIGNANT NEOPLASM OF RECTUM: ICD-10-CM

## 2019-01-16 DIAGNOSIS — Z90.49 ACQUIRED ABSENCE OF OTHER SPECIFIED PARTS OF DIGESTIVE TRACT: Chronic | ICD-10-CM

## 2019-01-16 DIAGNOSIS — Z98.890 OTHER SPECIFIED POSTPROCEDURAL STATES: Chronic | ICD-10-CM

## 2019-01-16 PROCEDURE — 71260 CT THORAX DX C+: CPT | Mod: 26

## 2019-01-16 PROCEDURE — 74177 CT ABD & PELVIS W/CONTRAST: CPT | Mod: 26

## 2019-01-16 PROCEDURE — 74177 CT ABD & PELVIS W/CONTRAST: CPT

## 2019-01-16 PROCEDURE — 71260 CT THORAX DX C+: CPT

## 2019-01-18 ENCOUNTER — OUTPATIENT (OUTPATIENT)
Dept: OUTPATIENT SERVICES | Facility: HOSPITAL | Age: 64
LOS: 1 days | Discharge: HOME | End: 2019-01-18

## 2019-01-18 DIAGNOSIS — Z90.49 ACQUIRED ABSENCE OF OTHER SPECIFIED PARTS OF DIGESTIVE TRACT: Chronic | ICD-10-CM

## 2019-01-18 DIAGNOSIS — C20 MALIGNANT NEOPLASM OF RECTUM: ICD-10-CM

## 2019-01-18 DIAGNOSIS — Z98.890 OTHER SPECIFIED POSTPROCEDURAL STATES: Chronic | ICD-10-CM

## 2019-01-23 ENCOUNTER — APPOINTMENT (OUTPATIENT)
Dept: HEMATOLOGY ONCOLOGY | Facility: CLINIC | Age: 64
End: 2019-01-23
Payer: MEDICAID

## 2019-01-23 ENCOUNTER — RESULT REVIEW (OUTPATIENT)
Age: 64
End: 2019-01-23

## 2019-01-23 VITALS
SYSTOLIC BLOOD PRESSURE: 150 MMHG | WEIGHT: 107.98 LBS | DIASTOLIC BLOOD PRESSURE: 68 MMHG | BODY MASS INDEX: 19.13 KG/M2 | TEMPERATURE: 97.9 F | RESPIRATION RATE: 16 BRPM | HEART RATE: 64 BPM | OXYGEN SATURATION: 100 %

## 2019-01-23 LAB
25(OH)D3 SERPL-MCNC: 34.6 NG/ML
ALBUMIN SERPL ELPH-MCNC: 4.3 G/DL
ALP BLD-CCNC: 35 U/L
ALT SERPL-CCNC: 11 U/L
ANION GAP SERPL CALC-SCNC: 12 MMOL/L
AST SERPL-CCNC: 19 U/L
BASOPHILS # BLD AUTO: 0 K/UL — SIGNIFICANT CHANGE UP (ref 0–0.2)
BASOPHILS NFR BLD AUTO: 0.5 % — SIGNIFICANT CHANGE UP (ref 0–2)
BILIRUB SERPL-MCNC: 0.6 MG/DL
BUN SERPL-MCNC: 15 MG/DL
CALCIUM SERPL-MCNC: 9.5 MG/DL
CHLORIDE SERPL-SCNC: 102 MMOL/L
CO2 SERPL-SCNC: 30 MMOL/L
CREAT SERPL-MCNC: 0.61 MG/DL
EOSINOPHIL # BLD AUTO: 0.1 K/UL — SIGNIFICANT CHANGE UP (ref 0–0.5)
EOSINOPHIL NFR BLD AUTO: 1.8 % — SIGNIFICANT CHANGE UP (ref 0–6)
GLUCOSE SERPL-MCNC: 86 MG/DL
HCT VFR BLD CALC: 39.6 % — SIGNIFICANT CHANGE UP (ref 34.5–45)
HGB BLD-MCNC: 13 G/DL — SIGNIFICANT CHANGE UP (ref 11.5–15.5)
LYMPHOCYTES # BLD AUTO: 0.8 K/UL — LOW (ref 1–3.3)
LYMPHOCYTES # BLD AUTO: 23.4 % — SIGNIFICANT CHANGE UP (ref 13–44)
MCHC RBC-ENTMCNC: 30.8 PG — SIGNIFICANT CHANGE UP (ref 27–34)
MCHC RBC-ENTMCNC: 32.9 G/DL — SIGNIFICANT CHANGE UP (ref 32–36)
MCV RBC AUTO: 93.7 FL — SIGNIFICANT CHANGE UP (ref 80–100)
MONOCYTES # BLD AUTO: 0.3 K/UL — SIGNIFICANT CHANGE UP (ref 0–0.9)
MONOCYTES NFR BLD AUTO: 7.8 % — SIGNIFICANT CHANGE UP (ref 2–14)
NEUTROPHILS # BLD AUTO: 2.2 K/UL — SIGNIFICANT CHANGE UP (ref 1.8–7.4)
NEUTROPHILS NFR BLD AUTO: 66.6 % — SIGNIFICANT CHANGE UP (ref 43–77)
PLATELET # BLD AUTO: 258 K/UL — SIGNIFICANT CHANGE UP (ref 150–400)
POTASSIUM SERPL-SCNC: 4.5 MMOL/L
PROT SERPL-MCNC: 6.8 G/DL
RBC # BLD: 4.23 M/UL — SIGNIFICANT CHANGE UP (ref 3.8–5.2)
RBC # FLD: 11.4 % — SIGNIFICANT CHANGE UP (ref 10.3–14.5)
SODIUM SERPL-SCNC: 143 MMOL/L
WBC # BLD: 3.3 K/UL — LOW (ref 3.8–10.5)
WBC # FLD AUTO: 3.3 K/UL — LOW (ref 3.8–10.5)

## 2019-01-23 PROCEDURE — 99214 OFFICE O/P EST MOD 30 MIN: CPT

## 2019-01-24 LAB — CEA SERPL-MCNC: 1.5 NG/ML

## 2019-01-24 NOTE — PHYSICAL EXAM
[Fully active, able to carry on all pre-disease performance without restriction] : Status 0 - Fully active, able to carry on all pre-disease performance without restriction [Normal] : affect appropriate [de-identified] : tearful during visit

## 2019-01-24 NOTE — HISTORY OF PRESENT ILLNESS
[Disease: _____________________] : Disease: [unfilled] [T: ___] : T[unfilled] [N: ___] : N[unfilled] [M: ___] : M[unfilled] [AJCC Stage: ____] : AJCC Stage: [unfilled] [de-identified] : 62-year-old Bengali female with PMH of HTN who had a Colonoscopy March 31,2017 due to lower abdominal pain and bloody BMs by dr Adis Fairbanks.  She was found to have a 3 cm infiltrating mass 7 cm from the anal verge with central ulceration and biopsy confirmed moderately differentiated invasive adenocarcinoma.  A CT a/p on 3/24/17 was negative for mets.  Her MRI pelvis from 3/31 showed some possible posterior rectal wall thickening as well as one enlarged perirectal lymph node and uterine fibroids but no obvious rectal mass was noted.  Patient consulted Dr Moore 4/5 and plan was for low anterior resection of rectum after chemoradiation.  Patient presented to us 4/11/2017 to plan further treatment.  She was started on RT/Xeloda 5/15/17 (around the same time lost her  in an accident) and planned to finish 6/22/17 then had resection in 8/17 \par \par Pt did not return for post surgery chemotherapy till January 2018 so given significant delay, adjuvant chemotherapy was not received.  \par \par She underwent loop ileostomy 8/2017 and then reversal was in 12/2017 by Dr Moore.  She was followed on surveillance thereafter.  [de-identified] : moderately differentiated invasive adenocarcinoma [de-identified] : DOMINIC [FreeTextEntry1] : RT/Xeloda 5/15-6/22/17 [de-identified] : Shanita comes by herself for follow up and feels well overall.  She had CT c/a/p done 1/16/19 which showed stable findings.  She was admitted to [facility] for SBO on 12/7/18 but that resolved with conservative management.  She denies any new complaints and has bee following with Dr Moore.

## 2019-01-24 NOTE — REVIEW OF SYSTEMS
[Anxiety] : anxiety [Negative] : Allergic/Immunologic [Fever] : no fever [Fatigue] : no fatigue [Recent Change In Weight] : ~T no recent weight change [Lower Ext Edema] : no lower extremity edema [SOB on Exertion] : no shortness of breath during exertion [Abdominal Pain] : no abdominal pain [Vomiting] : no vomiting [Constipation] : no constipation [Skin Rash] : no skin rash [FreeTextEntry7] : no rectal pain

## 2019-02-11 ENCOUNTER — APPOINTMENT (OUTPATIENT)
Dept: SURGICAL ONCOLOGY | Facility: CLINIC | Age: 64
End: 2019-02-11
Payer: MEDICARE

## 2019-02-11 VITALS
HEART RATE: 66 BPM | BODY MASS INDEX: 18.96 KG/M2 | RESPIRATION RATE: 16 BRPM | WEIGHT: 107 LBS | OXYGEN SATURATION: 97 % | DIASTOLIC BLOOD PRESSURE: 72 MMHG | SYSTOLIC BLOOD PRESSURE: 130 MMHG | HEIGHT: 63 IN

## 2019-02-11 PROCEDURE — 99214 OFFICE O/P EST MOD 30 MIN: CPT

## 2019-02-11 NOTE — ASSESSMENT
[FreeTextEntry1] : Imp:\par JENNI - hx of Rectal cancer\par Hospitalized 12/7 at [facility] with LBO- now resolved\par S/p rigid sigmoidoscopy and rectal dilatation for rectal stricture from low anterior anastomosis on 1/9/19. \par Improvement in BM's after dilataion\par CT C/A/P 1/16/19 -Stable exam. No evidence of metastatic disease. \par \par \par Plan:\par Bloodwork and imaging by Dr. Jones\par Yearly colonoscopy by Dr. Fairbanks ( next 3/19 )\par RTO 3 months then q 6 months

## 2019-02-11 NOTE — PHYSICAL EXAM
[Normal] : supple, no neck mass and thyroid not enlarged [Normal Neck Lymph Nodes] : normal neck lymph nodes  [Normal Supraclavicular Lymph Nodes] : normal supraclavicular lymph nodes [Normal Groin Lymph Nodes] : normal groin lymph nodes [Normal Axillary Lymph Nodes] : normal axillary lymph nodes [Normal] : oriented to person, place and time, with appropriate affect [de-identified] : soft, ND, NT

## 2019-02-11 NOTE — HISTORY OF PRESENT ILLNESS
[de-identified] : Ms Medeiros is a 63 y.o. female who presents for an initial post op visit, s/p rigid sigmoidoscopy and rectal dilatation for rectal stricture from low anterior anastomosis on 1/9/19. \par \par CT C/A/P 1/16/19 -Stable exam. No evidence of metastatic disease. \par \par Blood work 1/23/19 as per Dr. Jones: CEA (1.5) LFTs WNL\par \par Today she is with c/o ongoing incomplete bowel emptying and very odorous flatulence. Denies abdominal pain, nausea, vomiting or changes in appetite. \par \par Hx:\par She is s/p LAR with loop ileostomy on 8/4/17. Final pathology showed residual adenocarcinoma of the rectum (0.9 cm). 13 lymph nodes and margins were negative though tumor deposits were present. Stage T2N1c. She was re admitted on 8/31/17 with an SBO which resolved with NGT decompression. She is now post/op small bowel resection to close loop ileostomy with lysis of adhesions for release of internal hernia on 12/1/17.\par \par Recent CT chest/abd/pelvis from 1/27/18 showed new 5-6mm Right lower lobe groudglass pulmonary nodule.  Repeat CT chest from 3/30/18 showed that the nodule is stable.\par \par CT C/A/P 7/9/18 - Stable exam. \par \par Bloodwork October 2018 as per Dr. Jones: CEA (1.5 ng/mL)\par \par Eunwha states that she had a colonoscopy March 2018 with Dr. Fairbanks with benign findings.  As per patient she is to have a follow up colonoscopy next year.  She will have a copy of the final results forwarded to our office.\par \par She was hospitalized at [facility] from 12/7-12/9 with abdominal pain/nausea.  CT A/P obtained showed SBO with possible transition point in the midpelvis. On the day after admission she passed multiple small bowel movements and continued to pass flatus and was D/C home. \par \par \par PERTINENT HISTORY:\par Mid-March 2017 she noted lower abdominal pain and blood in her stool; denied weight loss, appetite changes, N/V, fever and/or chills. Colonoscopy done on 3/17/17 by Dr. Marlo Fairbanks revealed a 3 cm mass located 7 cm from the anal verge. Biopsy of the mass was consistent with adenocarcinoma, invasive, well to moderately differentiated. \par \par CT Abdomen/Pelvis with contrast done on 3/24/17 showed no evidence of metastatic disease to the abdomen or pelvis\par \par MRI Pelvis performed on 3/31/17 showed possible mild thickening of the rectal wall posteriorly with one enlarged candy rectal node suspicious for mets but no clear visualization of the primary mass. She completed a PET/CT on 4/24/17 which showed hypermetabolic rectal wall thickening corresponding to the known primary malignancy, with a single enlarged mildly hypermetabolic candy rectal node concerning for mets, but no evidence of distant disease.\par \par Given her likely stage III disease, she began neoadjuvant chemoradiation on 5/15/17 under the care of Dr. Jones and Dr. Booker which concluded on 6/22/17.\par \par Internist: Dr. Trevor Howell. \par

## 2019-02-11 NOTE — REASON FOR VISIT
[Post-Op] : a post-op for [FreeTextEntry2] : s/p rigid sigmoidoscopy and rectal dilatation for rectal stricture from low anterior anastomosis on 1/9/19. \par \par CT C/A/P 1/16/19 -Stable exam. No evidence of metastatic disease.

## 2019-02-20 ENCOUNTER — APPOINTMENT (OUTPATIENT)
Dept: SURGICAL ONCOLOGY | Facility: CLINIC | Age: 64
End: 2019-02-20

## 2019-05-08 ENCOUNTER — APPOINTMENT (OUTPATIENT)
Dept: SURGICAL ONCOLOGY | Facility: CLINIC | Age: 64
End: 2019-05-08
Payer: MEDICAID

## 2019-05-08 VITALS
TEMPERATURE: 97.6 F | SYSTOLIC BLOOD PRESSURE: 120 MMHG | DIASTOLIC BLOOD PRESSURE: 64 MMHG | HEART RATE: 59 BPM | OXYGEN SATURATION: 97 %

## 2019-05-08 PROCEDURE — 99214 OFFICE O/P EST MOD 30 MIN: CPT

## 2019-05-08 NOTE — PHYSICAL EXAM
[Normal] : supple, no neck mass and thyroid not enlarged [Normal Supraclavicular Lymph Nodes] : normal supraclavicular lymph nodes [Normal Groin Lymph Nodes] : normal groin lymph nodes [Normal] : oriented to person, place and time, with appropriate affect [FreeTextEntry1] : Deferred  [de-identified] : soft, ND, NT.  No palpable masses.

## 2019-05-08 NOTE — ASSESSMENT
[FreeTextEntry1] : Imp:\par JENNI - hx of Rectal cancer\par CT C/A/P 1/16/19 -Stable exam. No evidence of metastatic disease. \par \par \par Plan:\par Bloodwork and imaging by Dr. Jones\par Request colonoscopy report from Dr. Fairbanks ( from 3/19 )\par RTO q 6 months

## 2019-05-08 NOTE — HISTORY OF PRESENT ILLNESS
[de-identified] : Ms Medeiros is a 64 y.o. female who presents for her follow up visit for rectal cancer.  She began neoadjuvant chemoradiation on 5/15/17 under the care of Dr. Jones and Dr. Booker which concluded on 6/22/17.\par \par She is s/p LAR with loop ileostomy on 8/4/17. Final pathology showed residual adenocarcinoma of the rectum (0.9 cm). 13 lymph nodes and margins were negative though tumor deposits were present. Stage T2N1c. She was re admitted on 8/31/17 with an SBO which resolved with NGT decompression. She is now post/op small bowel resection to close loop ileostomy with lysis of adhesions for release of internal hernia on 12/1/17.\par \par She was hospitalized at [facility] from 12/7-12/9/18 with abdominal pain/nausea.  CT A/P obtained showed SBO with possible transition point in the midpelvis. On the day after admission she passed multiple small bowel movements and continued to pass flatus and was D/C home. \par \par She is s/p rigid sigmoidoscopy and rectal dilatation for rectal stricture from low anterior anastomosis on 1/9/19. \par \par CT C/A/P 1/16/19 -Stable exam. No evidence of metastatic disease. \par \par Blood work 1/23/19 as per Dr. Jones: CEA (1.5) LFTs WNL\par Underwent a colonoscopy by Dr. Fairbanks March 2019 which she reports was normal.\par \par She is tolerating PO intake without nausea, vomiting or abdominal pain. Reports daily BM's without BRBPR and passing flatus. Complains of experiencing a lot of malodorous flatus despite refraining from certain food.\par \par Internist: Dr. Trevor Howell.

## 2019-05-21 ENCOUNTER — OUTPATIENT (OUTPATIENT)
Dept: OUTPATIENT SERVICES | Facility: HOSPITAL | Age: 64
LOS: 1 days | Discharge: HOME | End: 2019-05-21

## 2019-05-21 DIAGNOSIS — C20 MALIGNANT NEOPLASM OF RECTUM: ICD-10-CM

## 2019-05-21 DIAGNOSIS — Z98.890 OTHER SPECIFIED POSTPROCEDURAL STATES: Chronic | ICD-10-CM

## 2019-05-21 DIAGNOSIS — Z90.49 ACQUIRED ABSENCE OF OTHER SPECIFIED PARTS OF DIGESTIVE TRACT: Chronic | ICD-10-CM

## 2019-05-22 ENCOUNTER — RESULT REVIEW (OUTPATIENT)
Age: 64
End: 2019-05-22

## 2019-05-22 ENCOUNTER — APPOINTMENT (OUTPATIENT)
Dept: HEMATOLOGY ONCOLOGY | Facility: CLINIC | Age: 64
End: 2019-05-22
Payer: MEDICAID

## 2019-05-22 VITALS
BODY MASS INDEX: 19.72 KG/M2 | RESPIRATION RATE: 18 BRPM | WEIGHT: 111.33 LBS | TEMPERATURE: 98.3 F | DIASTOLIC BLOOD PRESSURE: 70 MMHG | SYSTOLIC BLOOD PRESSURE: 110 MMHG | HEART RATE: 63 BPM | OXYGEN SATURATION: 98 %

## 2019-05-22 LAB
ALBUMIN SERPL ELPH-MCNC: 4.3 G/DL
ALP BLD-CCNC: 36 U/L
ALT SERPL-CCNC: 15 U/L
ANION GAP SERPL CALC-SCNC: 10 MMOL/L
AST SERPL-CCNC: 20 U/L
BASOPHILS # BLD AUTO: 0 K/UL — SIGNIFICANT CHANGE UP (ref 0–0.2)
BASOPHILS NFR BLD AUTO: 0.3 % — SIGNIFICANT CHANGE UP (ref 0–2)
BILIRUB SERPL-MCNC: 0.6 MG/DL
BUN SERPL-MCNC: 12 MG/DL
CALCIUM SERPL-MCNC: 9.4 MG/DL
CEA SERPL-MCNC: 1.4 NG/ML
CHLORIDE SERPL-SCNC: 102 MMOL/L
CO2 SERPL-SCNC: 30 MMOL/L
CREAT SERPL-MCNC: 0.61 MG/DL
EOSINOPHIL # BLD AUTO: 0.1 K/UL — SIGNIFICANT CHANGE UP (ref 0–0.5)
EOSINOPHIL NFR BLD AUTO: 2.5 % — SIGNIFICANT CHANGE UP (ref 0–6)
GLUCOSE SERPL-MCNC: 83 MG/DL
HCT VFR BLD CALC: 36.4 % — SIGNIFICANT CHANGE UP (ref 34.5–45)
HGB BLD-MCNC: 12.4 G/DL — SIGNIFICANT CHANGE UP (ref 11.5–15.5)
LYMPHOCYTES # BLD AUTO: 0.9 K/UL — LOW (ref 1–3.3)
LYMPHOCYTES # BLD AUTO: 29.8 % — SIGNIFICANT CHANGE UP (ref 13–44)
MCHC RBC-ENTMCNC: 32 PG — SIGNIFICANT CHANGE UP (ref 27–34)
MCHC RBC-ENTMCNC: 33.9 G/DL — SIGNIFICANT CHANGE UP (ref 32–36)
MCV RBC AUTO: 94.3 FL — SIGNIFICANT CHANGE UP (ref 80–100)
MONOCYTES # BLD AUTO: 0.2 K/UL — SIGNIFICANT CHANGE UP (ref 0–0.9)
MONOCYTES NFR BLD AUTO: 7.2 % — SIGNIFICANT CHANGE UP (ref 2–14)
NEUTROPHILS # BLD AUTO: 1.8 K/UL — SIGNIFICANT CHANGE UP (ref 1.8–7.4)
NEUTROPHILS NFR BLD AUTO: 60.2 % — SIGNIFICANT CHANGE UP (ref 43–77)
PLATELET # BLD AUTO: 237 K/UL — SIGNIFICANT CHANGE UP (ref 150–400)
POTASSIUM SERPL-SCNC: 4.8 MMOL/L
PROT SERPL-MCNC: 6.9 G/DL
RBC # BLD: 3.86 M/UL — SIGNIFICANT CHANGE UP (ref 3.8–5.2)
RBC # FLD: 11.5 % — SIGNIFICANT CHANGE UP (ref 10.3–14.5)
SODIUM SERPL-SCNC: 142 MMOL/L
WBC # BLD: 3 K/UL — LOW (ref 3.8–10.5)
WBC # FLD AUTO: 3 K/UL — LOW (ref 3.8–10.5)

## 2019-05-22 PROCEDURE — 99214 OFFICE O/P EST MOD 30 MIN: CPT

## 2019-05-22 NOTE — HISTORY OF PRESENT ILLNESS
[Disease: _____________________] : Disease: [unfilled] [T: ___] : T[unfilled] [N: ___] : N[unfilled] [M: ___] : M[unfilled] [AJCC Stage: ____] : AJCC Stage: [unfilled] [de-identified] : 62-year-old Lao female with PMH of HTN who had a Colonoscopy March 31,2017 due to lower abdominal pain and bloody BMs by dr Adis Fairbanks.  She was found to have a 3 cm infiltrating mass 7 cm from the anal verge with central ulceration and biopsy confirmed moderately differentiated invasive adenocarcinoma.  A CT a/p on 3/24/17 was negative for mets.  Her MRI pelvis from 3/31 showed some possible posterior rectal wall thickening as well as one enlarged perirectal lymph node and uterine fibroids but no obvious rectal mass was noted.  Patient consulted Dr Moore 4/5 and plan was for low anterior resection of rectum after chemoradiation.  Patient presented to us 4/11/2017 to plan further treatment.  She was started on RT/Xeloda 5/15/17 (around the same time lost her  in an accident) and planned to finish 6/22/17 then had resection in 8/17 \par \par Pt did not return for post surgery chemotherapy till January 2018 so given significant delay, adjuvant chemotherapy was not received.  \par \par She underwent loop ileostomy 8/2017 and then reversal was in 12/2017 by Dr Moore.  She was followed on surveillance thereafter. She had CT c/a/p done 1/16/19 which showed stable findings.  She was admitted to [facility] for SBO on 12/7/18 but that resolved with conservative management.   [de-identified] : moderately differentiated invasive adenocarcinoma [de-identified] : DOMINIC\par  [FreeTextEntry1] : RT/Xeloda 5/15-6/22/17 [de-identified] : Shanita comes in for follow up while on surveillance and states overall she feels well overall except for some constipation on and off for which she is taking Metamucil and that helps.  She had colonoscopy 3/2019 with Dr Fairbanks which was ok and is to repeat a sigmoidoscopy in 03/2020.  She follows with Dr Moore and PMSONDRA routinely and had GYN work up including pap smear and mammogram which are reported normal.  \par \par \par \par

## 2019-05-22 NOTE — REVIEW OF SYSTEMS
[Anxiety] : anxiety [Negative] : Allergic/Immunologic [Constipation] : constipation [Fever] : no fever [Fatigue] : no fatigue [Recent Change In Weight] : ~T no recent weight change [Lower Ext Edema] : no lower extremity edema [SOB on Exertion] : no shortness of breath during exertion [Abdominal Pain] : no abdominal pain [Vomiting] : no vomiting [Skin Rash] : no skin rash [FreeTextEntry7] : no rectal pain

## 2019-05-23 NOTE — H&P PST ADULT - I HAVE PERSONALLY SEEN AND EXAMINED THE PATIENT. THERE HAVE NOT BEEN ANY CHANGES IN THE PATIENT'S HISTORY OR EXAM UNLESS COMMENTED BELOW
"    5/23/2019         RE: Iban Montes De Oca  [address] [facility] [address]        Dear Colleague,    Thank you for referring your patient, Iban Montes De Oca, to the [facility]. Please see a copy of my visit note below.    Sports Medicine Clinic Visit    PCP: Jennifer Ashraf    CC: Patient presents with:  Left Hip - Pain      HPI:  Iban Montes De Oca is a 14 year old female who is seen in consultation at the request of Dr. Gonzalez.   She notes left hip pain that began 2 weeks ago when she was running during her lacrosse game. She has pain on the \"inside\" she states. Pain is on the anterior-inferior aspect of her ASIS and posterior to her gluteus medius. She rates the pain at a  6/10 at its worst and a 2/10 currently.  Symptoms are relieved with rest. Symptoms are worsened by running, standing, activity and walking. She endorses instability, tingling and \"feeling of giving out,\" cracking sensation.  She denies swelling, bruising, popping, grinding, catching, locking and numbness.  Other treatment has included home exercises (provided by ) and KT Tape. She notes difficulty with day-to-day activity for walking and running. She would like to try anything that may help.      Review of Systems:  Musculoskeletal: as above  Remainder of review of systems is negative including constitutional, eyes, ENT, CV, pulmonary, GI, , endocrine, skin, hematologic, and neurologic except as noted in HPI or medical history.    History reviewed. No pertinent past surgical/medical/family/social history other than as mentioned in HPI.    Patient Active Problem List   Diagnosis     ADHD (attention deficit hyperactivity disorder), inattentive type     Persistent disorder of initiating or maintaining sleep     Family history of congenital aortic stenosis, neg ECHO     Nexplanon in place     Hip pain, left     Past Medical History:   Diagnosis Date     ADHD      Past Surgical History:   Procedure " Laterality Date     NO HISTORY OF SURGERY       Family History   Problem Relation Age of Onset     Glaucoma Other      Asthma No family hx of      Diabetes No family hx of      Macular Degeneration No family hx of      Retinal detachment No family hx of      Social History     Socioeconomic History     Marital status: Single     Spouse name: Not on file     Number of children: Not on file     Years of education: Not on file     Highest education level: Not on file   Occupational History     Not on file   Social Needs     Financial resource strain: Not on file     Food insecurity:     Worry: Not on file     Inability: Not on file     Transportation needs:     Medical: Not on file     Non-medical: Not on file   Tobacco Use     Smoking status: Never Smoker     Smokeless tobacco: Never Used     Tobacco comment: no smoking in the home   Substance and Sexual Activity     Alcohol use: No     Drug use: No     Sexual activity: Never   Lifestyle     Physical activity:     Days per week: Not on file     Minutes per session: Not on file     Stress: Not on file   Relationships     Social connections:     Talks on phone: Not on file     Gets together: Not on file     Attends Orthodox service: Not on file     Active member of club or organization: Not on file     Attends meetings of clubs or organizations: Not on file     Relationship status: Not on file     Intimate partner violence:     Fear of current or ex partner: Not on file     Emotionally abused: Not on file     Physically abused: Not on file     Forced sexual activity: Not on file   Other Topics Concern     Not on file   Social History Narrative     Not on file       She attends [employer] and is in 9th Grade.    Current Outpatient Medications   Medication     etonogestrel (IMPLANON/NEXPLANON) 68 MG IMPL     lisdexamfetamine (VYVANSE) 40 MG capsule     lisdexamfetamine (VYVANSE) 40 MG capsule     No current facility-administered medications for this visit.   "    Allergies   Allergen Reactions     No Known Drug Allergies          Objective:  BP 90/55 (BP Location: Left arm, Patient Position: Sitting, Cuff Size: Adult Regular)   Ht 1.74 m (5' 8.5\")   Wt 52.6 kg (116 lb)   LMP  (LMP Unknown)   BMI 17.38 kg/m       General: Alert and in no distress    Head: Normocephalic, atraumatic  Eyes: no scleral icterus or conjunctival erythema   Oropharynx:  Mucous membranes moist  Skin: no erythema, petechiae, or jaundice  CV: regular rhythm by palpation, 2+ distal pulses  Resp: normal respiratory effort without conversational dyspnea   Psych: normal mood and affect    Gait: Limping  Neuro: Motor strength and sensation as noted below    Musculoskeletal:    Bilateral hip exam    Inspection:      no edema or ecchymosis in hip area    Tenderness:  None    ROM: Left hip internal rotation is decreased and painful.    Strength: 5/5 hip extension/flexion/abduction/adduction, knee extension/flexion, ankle dorsiflexion/plantarflexion, great toe extension, toe flexion    Sensation: grossly intact to light touch in the lower extremities bilaterally      Radiology:  Independent visualization of images performed and reviewed with Iban and her mom.    Recent Results (from the past 744 hour(s))   XR Femur Left 2 Views    Narrative    LEFT FEMUR TWO VIEWS  2019 8:04 AM     HISTORY: Hip pain, left.    COMPARISON: None.    FINDINGS:  No acute fractures or dislocations. Alignment is anatomic.  Soft tissues are normal.       Impression    IMPRESSION: No fractures or dislocations.     GIRISH AZEVEDO MD     Assessment:  1. Acute hip pain, left        Plan:  Discussed the assessment with the patient and developed a plan together:  -MRI of the left hip with contrast ordered.  [facility] [phone]. Cost estimates can be provided by [employer] [facility] at [phone].  -No lacrosse at this time.  -Use crutches as needed.  -Patient's preferred over the counter medication " as directed on packaging as needed for pain or soreness.    -Following completion of MRI in clinic. Please schedule at 1-2 days after MRI is completed to ensure we have the results of the MRI      Yani Campuzano MD, [facility]  [facility]      Again, thank you for allowing me to participate in the care of your patient.        Sincerely,        Audelia Campuzano MD     Statement Selected

## 2019-05-24 LAB — 25(OH)D3 SERPL-MCNC: 38.2 NG/ML

## 2019-07-06 NOTE — ED PROVIDER NOTE - NS ED ATTENDING STATEMENT MOD
I have personally seen and examined this patient.  I have fully participated in the care of this patient. I have reviewed all pertinent clinical information, including history, physical exam, plan and the Resident’s note and agree except as noted.
0

## 2019-07-09 NOTE — PATIENT PROFILE ADULT. - PHONE #
Patient calling in-no improvement in sx.-sneezing increased, cough, and increased in nasal congestion. Has an upcoming appointment on her non-closing trach hole in 1 week. She states she can not be sick or they will not do the surgery.   She states the Flonase is not helping at all   ( visit 07/07/19)
Patient calling stating that she needs to speak with [facility]. Patient does not want to speak to writer.
Please pend a Zpak to her pharmacy.
[phone]

## 2019-07-21 NOTE — PATIENT PROFILE ADULT. - PURPOSEFUL PROACTIVE ROUNDING
PROVIDER:[TOKEN:[[id number]]],FREE:[LAST:[PMD],PHONE:[(   )    -],FAX:[(   )    -]],FREE:[LAST:[Cardiology.],PHONE:[(   )    -],FAX:[(   )    -]]
Patient

## 2019-09-20 ENCOUNTER — OUTPATIENT (OUTPATIENT)
Dept: OUTPATIENT SERVICES | Facility: HOSPITAL | Age: 64
LOS: 1 days | Discharge: HOME | End: 2019-09-20

## 2019-09-20 DIAGNOSIS — C20 MALIGNANT NEOPLASM OF RECTUM: ICD-10-CM

## 2019-09-20 DIAGNOSIS — Z98.890 OTHER SPECIFIED POSTPROCEDURAL STATES: Chronic | ICD-10-CM

## 2019-09-20 DIAGNOSIS — Z90.49 ACQUIRED ABSENCE OF OTHER SPECIFIED PARTS OF DIGESTIVE TRACT: Chronic | ICD-10-CM

## 2019-09-23 ENCOUNTER — APPOINTMENT (OUTPATIENT)
Dept: HEMATOLOGY ONCOLOGY | Facility: CLINIC | Age: 64
End: 2019-09-23
Payer: MEDICARE

## 2019-09-23 ENCOUNTER — RESULT REVIEW (OUTPATIENT)
Age: 64
End: 2019-09-23

## 2019-09-23 VITALS
OXYGEN SATURATION: 9 % | RESPIRATION RATE: 18 BRPM | BODY MASS INDEX: 19.14 KG/M2 | SYSTOLIC BLOOD PRESSURE: 110 MMHG | HEART RATE: 62 BPM | DIASTOLIC BLOOD PRESSURE: 70 MMHG | WEIGHT: 108.03 LBS | TEMPERATURE: 98 F

## 2019-09-23 LAB
25(OH)D3 SERPL-MCNC: 61.6 NG/ML
ALBUMIN SERPL ELPH-MCNC: 4.3 G/DL
ALP BLD-CCNC: 32 U/L
ALT SERPL-CCNC: 18 U/L
ANION GAP SERPL CALC-SCNC: 10 MMOL/L
AST SERPL-CCNC: 25 U/L
BASOPHILS # BLD AUTO: 0 K/UL — SIGNIFICANT CHANGE UP (ref 0–0.2)
BASOPHILS NFR BLD AUTO: 0.8 % — SIGNIFICANT CHANGE UP (ref 0–2)
BILIRUB SERPL-MCNC: 0.6 MG/DL
BUN SERPL-MCNC: 18 MG/DL
CALCIUM SERPL-MCNC: 9.5 MG/DL
CEA SERPL-MCNC: 1.9 NG/ML
CHLORIDE SERPL-SCNC: 101 MMOL/L
CO2 SERPL-SCNC: 31 MMOL/L
CREAT SERPL-MCNC: 0.63 MG/DL
EOSINOPHIL # BLD AUTO: 0.1 K/UL — SIGNIFICANT CHANGE UP (ref 0–0.5)
EOSINOPHIL NFR BLD AUTO: 2.8 % — SIGNIFICANT CHANGE UP (ref 0–6)
GLUCOSE SERPL-MCNC: 90 MG/DL
HCT VFR BLD CALC: 39 % — SIGNIFICANT CHANGE UP (ref 34.5–45)
HGB BLD-MCNC: 12.7 G/DL — SIGNIFICANT CHANGE UP (ref 11.5–15.5)
LYMPHOCYTES # BLD AUTO: 1 K/UL — SIGNIFICANT CHANGE UP (ref 1–3.3)
LYMPHOCYTES # BLD AUTO: 27 % — SIGNIFICANT CHANGE UP (ref 13–44)
MCHC RBC-ENTMCNC: 31.6 PG — SIGNIFICANT CHANGE UP (ref 27–34)
MCHC RBC-ENTMCNC: 32.5 G/DL — SIGNIFICANT CHANGE UP (ref 32–36)
MCV RBC AUTO: 97.3 FL — SIGNIFICANT CHANGE UP (ref 80–100)
MONOCYTES # BLD AUTO: 0.4 K/UL — SIGNIFICANT CHANGE UP (ref 0–0.9)
MONOCYTES NFR BLD AUTO: 10 % — SIGNIFICANT CHANGE UP (ref 2–14)
NEUTROPHILS # BLD AUTO: 2.2 K/UL — SIGNIFICANT CHANGE UP (ref 1.8–7.4)
NEUTROPHILS NFR BLD AUTO: 59.5 % — SIGNIFICANT CHANGE UP (ref 43–77)
PLATELET # BLD AUTO: 249 K/UL — SIGNIFICANT CHANGE UP (ref 150–400)
POTASSIUM SERPL-SCNC: 4.6 MMOL/L
PROT SERPL-MCNC: 7 G/DL
RBC # BLD: 4.01 M/UL — SIGNIFICANT CHANGE UP (ref 3.8–5.2)
RBC # FLD: 12.8 % — SIGNIFICANT CHANGE UP (ref 10.3–14.5)
SODIUM SERPL-SCNC: 142 MMOL/L
TSH SERPL-ACNC: 3.24 UIU/ML
WBC # BLD: 3.7 K/UL — LOW (ref 3.8–10.5)
WBC # FLD AUTO: 3.7 K/UL — LOW (ref 3.8–10.5)

## 2019-09-23 PROCEDURE — 99214 OFFICE O/P EST MOD 30 MIN: CPT

## 2019-09-23 RX ORDER — GINGER ROOT/GINGER ROOT EXT 262.5 MG
600-800 CAPSULE ORAL
Qty: 60 | Refills: 3 | Status: DISCONTINUED | COMMUNITY
Start: 2019-05-22 | End: 2019-09-23

## 2019-09-25 NOTE — REVIEW OF SYSTEMS
[Anxiety] : anxiety [Negative] : Allergic/Immunologic [Diarrhea] : diarrhea [Fever] : no fever [Fatigue] : no fatigue [Recent Change In Weight] : ~T no recent weight change [Lower Ext Edema] : no lower extremity edema [SOB on Exertion] : no shortness of breath during exertion [Abdominal Pain] : no abdominal pain [Vomiting] : no vomiting [Skin Rash] : no skin rash [FreeTextEntry7] : no rectal pain

## 2019-09-25 NOTE — HISTORY OF PRESENT ILLNESS
[Disease: _____________________] : Disease: [unfilled] [T: ___] : T[unfilled] [N: ___] : N[unfilled] [M: ___] : M[unfilled] [AJCC Stage: ____] : AJCC Stage: [unfilled] [de-identified] : 62-year-old Turkmen female with PMH of HTN who had a Colonoscopy March 31,2017 due to lower abdominal pain and bloody BMs by dr Adis Fairbanks.  She was found to have a 3 cm infiltrating mass 7 cm from the anal verge with central ulceration and biopsy confirmed moderately differentiated invasive adenocarcinoma.  A CT a/p on 3/24/17 was negative for mets.  Her MRI pelvis from 3/31 showed some possible posterior rectal wall thickening as well as one enlarged perirectal lymph node and uterine fibroids but no obvious rectal mass was noted.  Patient consulted Dr Moore 4/5 and plan was for low anterior resection of rectum after chemoradiation.  Patient presented to us 4/11/2017 to plan further treatment.  She was started on RT/Xeloda 5/15/17 (around the same time lost her  in an accident) and planned to finish 6/22/17 then had resection in 8/17 \par \par Pt did not return for post surgery chemotherapy till January 2018 so given significant delay, adjuvant chemotherapy was not received.  \par \par She underwent loop ileostomy 8/2017 and then reversal was in 12/2017 by Dr Moore.  She was followed on surveillance thereafter. She had CT c/a/p done 1/16/19 which showed stable findings.  She was admitted to [facility] for SBO on 12/7/18 but that resolved with conservative management.  \par \par She had colonoscopy 3/2019 with Dr Fairbanks which was ok and is to repeat a sigmoidoscopy in 03/2020 [de-identified] : DOMINIC\par  [de-identified] : moderately differentiated invasive adenocarcinoma [FreeTextEntry1] : RT/Xeloda 5/15-6/22/17 --> loop ileostomy 8/2017 and then reversal was in 12/2017 [de-identified] : Shanita comes in for follow up while on surveillance and feels well overall.  She continues to have issues with her BM given urgency, frequency and local burning which is all intermittent over the last year.  She has been taking Metamucil as advised by Dr Moore but denies any blood in stool.  She follows with PMD routely and tries to keep active as much as possible.  She lives with her adult son.  \par \par

## 2019-10-01 ENCOUNTER — OUTPATIENT (OUTPATIENT)
Dept: OUTPATIENT SERVICES | Facility: HOSPITAL | Age: 64
LOS: 1 days | End: 2019-10-01
Payer: MEDICARE

## 2019-10-01 DIAGNOSIS — Z98.890 OTHER SPECIFIED POSTPROCEDURAL STATES: Chronic | ICD-10-CM

## 2019-10-01 DIAGNOSIS — Z90.49 ACQUIRED ABSENCE OF OTHER SPECIFIED PARTS OF DIGESTIVE TRACT: Chronic | ICD-10-CM

## 2019-10-01 PROCEDURE — G9001: CPT

## 2019-10-15 ENCOUNTER — TRANSCRIPTION ENCOUNTER (OUTPATIENT)
Age: 64
End: 2019-10-15

## 2019-10-16 ENCOUNTER — INPATIENT (INPATIENT)
Facility: HOSPITAL | Age: 64
LOS: 6 days | Discharge: HOME | End: 2019-10-23
Attending: SPECIALIST | Admitting: SPECIALIST
Payer: MEDICARE

## 2019-10-16 ENCOUNTER — RESULT REVIEW (OUTPATIENT)
Age: 64
End: 2019-10-16

## 2019-10-16 VITALS
DIASTOLIC BLOOD PRESSURE: 69 MMHG | TEMPERATURE: 98 F | RESPIRATION RATE: 16 BRPM | HEART RATE: 80 BPM | SYSTOLIC BLOOD PRESSURE: 139 MMHG

## 2019-10-16 DIAGNOSIS — Z90.49 ACQUIRED ABSENCE OF OTHER SPECIFIED PARTS OF DIGESTIVE TRACT: Chronic | ICD-10-CM

## 2019-10-16 DIAGNOSIS — Z98.890 OTHER SPECIFIED POSTPROCEDURAL STATES: Chronic | ICD-10-CM

## 2019-10-16 DIAGNOSIS — K56.609 UNSPECIFIED INTESTINAL OBSTRUCTION, UNSPECIFIED AS TO PARTIAL VERSUS COMPLETE OBSTRUCTION: ICD-10-CM

## 2019-10-16 LAB
ALBUMIN SERPL ELPH-MCNC: 4.4 G/DL — SIGNIFICANT CHANGE UP (ref 3.3–5)
ALP SERPL-CCNC: 33 U/L — LOW (ref 40–120)
ALT FLD-CCNC: 16 U/L — SIGNIFICANT CHANGE UP (ref 4–33)
ANION GAP SERPL CALC-SCNC: 15 MMO/L — HIGH (ref 7–14)
APTT BLD: 28 SEC — SIGNIFICANT CHANGE UP (ref 27.5–36.3)
AST SERPL-CCNC: 26 U/L — SIGNIFICANT CHANGE UP (ref 4–32)
BASE EXCESS BLDV CALC-SCNC: 2.9 MMOL/L — SIGNIFICANT CHANGE UP
BILIRUB SERPL-MCNC: 1 MG/DL — SIGNIFICANT CHANGE UP (ref 0.2–1.2)
BLD GP AB SCN SERPL QL: NEGATIVE — SIGNIFICANT CHANGE UP
BLOOD GAS VENOUS - CREATININE: 0.6 MG/DL — SIGNIFICANT CHANGE UP (ref 0.5–1.3)
BUN SERPL-MCNC: 15 MG/DL — SIGNIFICANT CHANGE UP (ref 7–23)
CALCIUM SERPL-MCNC: 9.2 MG/DL — SIGNIFICANT CHANGE UP (ref 8.4–10.5)
CHLORIDE BLDV-SCNC: 102 MMOL/L — SIGNIFICANT CHANGE UP (ref 96–108)
CHLORIDE SERPL-SCNC: 100 MMOL/L — SIGNIFICANT CHANGE UP (ref 98–107)
CO2 SERPL-SCNC: 23 MMOL/L — SIGNIFICANT CHANGE UP (ref 22–31)
CREAT SERPL-MCNC: 0.59 MG/DL — SIGNIFICANT CHANGE UP (ref 0.5–1.3)
GAS PNL BLDV: 138 MMOL/L — SIGNIFICANT CHANGE UP (ref 136–146)
GLUCOSE BLDV-MCNC: 111 MG/DL — HIGH (ref 70–99)
GLUCOSE SERPL-MCNC: 114 MG/DL — HIGH (ref 70–99)
HCO3 BLDV-SCNC: 26 MMOL/L — SIGNIFICANT CHANGE UP (ref 20–27)
HCT VFR BLD CALC: 41.7 % — SIGNIFICANT CHANGE UP (ref 34.5–45)
HCT VFR BLDV CALC: 41.9 % — SIGNIFICANT CHANGE UP (ref 34.5–45)
HGB BLD-MCNC: 13.5 G/DL — SIGNIFICANT CHANGE UP (ref 11.5–15.5)
HGB BLDV-MCNC: 13.6 G/DL — SIGNIFICANT CHANGE UP (ref 11.5–15.5)
INR BLD: 0.95 — SIGNIFICANT CHANGE UP (ref 0.88–1.17)
LACTATE BLDV-MCNC: 1.6 MMOL/L — SIGNIFICANT CHANGE UP (ref 0.5–2)
LIDOCAIN IGE QN: 37.3 U/L — SIGNIFICANT CHANGE UP (ref 7–60)
MCHC RBC-ENTMCNC: 30.8 PG — SIGNIFICANT CHANGE UP (ref 27–34)
MCHC RBC-ENTMCNC: 32.4 % — SIGNIFICANT CHANGE UP (ref 32–36)
MCV RBC AUTO: 95.2 FL — SIGNIFICANT CHANGE UP (ref 80–100)
NRBC # FLD: 0 K/UL — SIGNIFICANT CHANGE UP (ref 0–0)
PCO2 BLDV: 47 MMHG — SIGNIFICANT CHANGE UP (ref 41–51)
PH BLDV: 7.38 PH — SIGNIFICANT CHANGE UP (ref 7.32–7.43)
PLATELET # BLD AUTO: 240 K/UL — SIGNIFICANT CHANGE UP (ref 150–400)
PMV BLD: 8.5 FL — SIGNIFICANT CHANGE UP (ref 7–13)
PO2 BLDV: 49 MMHG — HIGH (ref 35–40)
POTASSIUM BLDV-SCNC: 3.5 MMOL/L — SIGNIFICANT CHANGE UP (ref 3.4–4.5)
POTASSIUM SERPL-MCNC: 4 MMOL/L — SIGNIFICANT CHANGE UP (ref 3.5–5.3)
POTASSIUM SERPL-SCNC: 4 MMOL/L — SIGNIFICANT CHANGE UP (ref 3.5–5.3)
PROT SERPL-MCNC: 7.1 G/DL — SIGNIFICANT CHANGE UP (ref 6–8.3)
PROTHROM AB SERPL-ACNC: 10.5 SEC — SIGNIFICANT CHANGE UP (ref 9.8–13.1)
RBC # BLD: 4.38 M/UL — SIGNIFICANT CHANGE UP (ref 3.8–5.2)
RBC # FLD: 13 % — SIGNIFICANT CHANGE UP (ref 10.3–14.5)
RH IG SCN BLD-IMP: POSITIVE — SIGNIFICANT CHANGE UP
SAO2 % BLDV: 80.6 % — SIGNIFICANT CHANGE UP (ref 60–85)
SODIUM SERPL-SCNC: 138 MMOL/L — SIGNIFICANT CHANGE UP (ref 135–145)
WBC # BLD: 5.98 K/UL — SIGNIFICANT CHANGE UP (ref 3.8–10.5)
WBC # FLD AUTO: 5.98 K/UL — SIGNIFICANT CHANGE UP (ref 3.8–10.5)

## 2019-10-16 PROCEDURE — 44050 REDUCE BOWEL OBSTRUCTION: CPT | Mod: 82

## 2019-10-16 PROCEDURE — 44050 REDUCE BOWEL OBSTRUCTION: CPT

## 2019-10-16 PROCEDURE — 44120 REMOVAL OF SMALL INTESTINE: CPT

## 2019-10-16 PROCEDURE — 44120 REMOVAL OF SMALL INTESTINE: CPT | Mod: 82

## 2019-10-16 PROCEDURE — 74177 CT ABD & PELVIS W/CONTRAST: CPT | Mod: 26

## 2019-10-16 PROCEDURE — 38747 REMOVE ABDOMINAL LYMPH NODES: CPT

## 2019-10-16 PROCEDURE — 88307 TISSUE EXAM BY PATHOLOGIST: CPT | Mod: 26

## 2019-10-16 PROCEDURE — 38747 REMOVE ABDOMINAL LYMPH NODES: CPT | Mod: 82

## 2019-10-16 RX ORDER — ACETAMINOPHEN 500 MG
750 TABLET ORAL ONCE
Refills: 0 | Status: COMPLETED | OUTPATIENT
Start: 2019-10-17 | End: 2019-10-17

## 2019-10-16 RX ORDER — PIPERACILLIN AND TAZOBACTAM 4; .5 G/20ML; G/20ML
3.38 INJECTION, POWDER, LYOPHILIZED, FOR SOLUTION INTRAVENOUS ONCE
Refills: 0 | Status: DISCONTINUED | OUTPATIENT
Start: 2019-10-16 | End: 2019-10-17

## 2019-10-16 RX ORDER — SODIUM CHLORIDE 9 MG/ML
1000 INJECTION, SOLUTION INTRAVENOUS
Refills: 0 | Status: DISCONTINUED | OUTPATIENT
Start: 2019-10-16 | End: 2019-10-18

## 2019-10-16 RX ORDER — ACETAMINOPHEN 500 MG
750 TABLET ORAL ONCE
Refills: 0 | Status: COMPLETED | OUTPATIENT
Start: 2019-10-16 | End: 2019-10-17

## 2019-10-16 RX ORDER — ENOXAPARIN SODIUM 100 MG/ML
40 INJECTION SUBCUTANEOUS DAILY
Refills: 0 | Status: DISCONTINUED | OUTPATIENT
Start: 2019-10-16 | End: 2019-10-23

## 2019-10-16 RX ORDER — SODIUM CHLORIDE 9 MG/ML
1000 INJECTION, SOLUTION INTRAVENOUS ONCE
Refills: 0 | Status: COMPLETED | OUTPATIENT
Start: 2019-10-16 | End: 2019-10-16

## 2019-10-16 RX ORDER — ONDANSETRON 8 MG/1
4 TABLET, FILM COATED ORAL EVERY 6 HOURS
Refills: 0 | Status: DISCONTINUED | OUTPATIENT
Start: 2019-10-16 | End: 2019-10-17

## 2019-10-16 RX ORDER — NALOXONE HYDROCHLORIDE 4 MG/.1ML
0.1 SPRAY NASAL
Refills: 0 | Status: DISCONTINUED | OUTPATIENT
Start: 2019-10-16 | End: 2019-10-17

## 2019-10-16 RX ORDER — HYDROMORPHONE HYDROCHLORIDE 2 MG/ML
0.5 INJECTION INTRAMUSCULAR; INTRAVENOUS; SUBCUTANEOUS
Refills: 0 | Status: DISCONTINUED | OUTPATIENT
Start: 2019-10-16 | End: 2019-10-17

## 2019-10-16 RX ORDER — HYDROMORPHONE HYDROCHLORIDE 2 MG/ML
30 INJECTION INTRAMUSCULAR; INTRAVENOUS; SUBCUTANEOUS
Refills: 0 | Status: DISCONTINUED | OUTPATIENT
Start: 2019-10-16 | End: 2019-10-17

## 2019-10-16 RX ORDER — SODIUM CHLORIDE 9 MG/ML
1000 INJECTION INTRAMUSCULAR; INTRAVENOUS; SUBCUTANEOUS ONCE
Refills: 0 | Status: COMPLETED | OUTPATIENT
Start: 2019-10-16 | End: 2019-10-16

## 2019-10-16 RX ADMIN — HYDROMORPHONE HYDROCHLORIDE 30 MILLILITER(S): 2 INJECTION INTRAMUSCULAR; INTRAVENOUS; SUBCUTANEOUS at 22:00

## 2019-10-16 RX ADMIN — SODIUM CHLORIDE 1000 MILLILITER(S): 9 INJECTION INTRAMUSCULAR; INTRAVENOUS; SUBCUTANEOUS at 15:57

## 2019-10-16 RX ADMIN — SODIUM CHLORIDE 125 MILLILITER(S): 9 INJECTION, SOLUTION INTRAVENOUS at 21:40

## 2019-10-16 RX ADMIN — SODIUM CHLORIDE 1000 MILLILITER(S): 9 INJECTION INTRAMUSCULAR; INTRAVENOUS; SUBCUTANEOUS at 14:32

## 2019-10-16 RX ADMIN — SODIUM CHLORIDE 2000 MILLILITER(S): 9 INJECTION, SOLUTION INTRAVENOUS at 17:00

## 2019-10-16 NOTE — ED PROVIDER NOTE - ATTENDING CONTRIBUTION TO CARE
This is a 65 y/o F PMHx rectal adenocardinoma (s/p LAR with loop ileostomy (2017) s/p reversal (12/2017) c/b multiple SBOs), s/p rigmoid sigmoidoscopy with dilation 1/9/19 p/w abdominal pain for 3 days a/w nausea. She reports last bowel movement on Monday and only passed a little bit of gas yesterday. Denies any fevers, chills, chest pain, SOB, urinary complaints or lower extremity edema. Abdominal discomfort to palpation (lower > upper). Plan- Labs, UA, CT A/P to r/o SBO, Symptomatic treatment with pain control/anti-emetics/IVF, Reassess

## 2019-10-16 NOTE — H&P ADULT - HISTORY OF PRESENT ILLNESS
Very pleasant 62F w/ no PMH other than hx of colon CA, s/p LAR and ileostomy reversal (both in 2017, Dr. Moore), 2 hospitalizations since than due to SBO (once w/ NGT, once without), presents today after 2 days of worsening abd pain, nausea w/o vomiting. Pt states that she had dinner on Monday night and went to bed, and overnight started to have sharp pain diffusely in the abdomen. Next day, pain gradually worsened and she also started to feel nauseated, prompting her son to bring her into [facility] Wednesday. Of note, pt last passed gas on Tuesday AM, and had BM also on Tuesday    In ED, pt's not tachycardic, but SBP initialy was in 200's most likely due to severe pain, physical exam significant for soft, moderately distended, severely tender diffusely, no guarding, well healed midline incision noted. Labs not significant, no leukocytosis, lactate of 1.8, mildly increased creatinine to 1.6, CTAP obtained in ED shows closed loop SBO w/ 2 transition points Very pleasant 62F w/ no PMH other than hx of colon CA, s/p LAR and ileostomy reversal (both in 2017, Dr. Moore), 2 hospitalizations since than due to SBO (once w/ NGT, once without), presents today after 2 days of worsening abd pain, nausea w/o vomiting. Pt states that she had dinner on Monday night and went to bed, and overnight started to have sharp pain diffusely in the abdomen. Next day, pain gradually worsened and she also started to feel nauseated, prompting her son to bring her into [facility] Wednesday. Of note, pt last passed gas on Tuesday AM, and had BM also on Tuesday    In ED, pt's not tachycardic, but SBP initialy was in 200's most likely due to severe pain, physical exam significant for soft, moderately distended, severely tender diffusely, no guarding, well healed midline incision noted. Labs not significant, no leukocytosis, lactate of 1.8, mildly increased creatinine to 1.6, CTAP obtained in ED shows closed loop SBO w/ 2 transition points    NGT was inserted while pt was in the ED, w/ immediate output of about 350cc

## 2019-10-16 NOTE — H&P ADULT - NSHPLABSRESULTS_GEN_ALL_CORE
Vital Signs Last 24 Hrs  T(C): 36.5 (16 Oct 2019 15:20), Max: 36.7 (16 Oct 2019 11:49)  T(F): 97.7 (16 Oct 2019 15:20), Max: 98 (16 Oct 2019 11:49)  HR: 66 (16 Oct 2019 15:20) (66 - 80)  BP: 204/78 (16 Oct 2019 15:20) (139/69 - 204/78)  BP(mean): --  RR: 16 (16 Oct 2019 15:20) (16 - 16)  SpO2: 100% (16 Oct 2019 15:20) (100% - 100%)      LABS:                        13.5   5.98  )-----------( 240      ( 16 Oct 2019 13:01 )             41.7     10-16    138  |  100  |  15  ----------------------------<  114<H>  4.0   |  23  |  0.59    Ca    9.2      16 Oct 2019 13:01    TPro  7.1  /  Alb  4.4  /  TBili  1.0  /  DBili  x   /  AST  26  /  ALT  16  /  AlkPhos  33<L>  10-16    IMAGING:  < from: CT Abdomen and Pelvis w/ Oral Cont and w/ IV Cont (10.16.19 @ 15:46) >      EXAM:  CT ABDOMEN AND PELVIS OC IC        PROCEDURE DATE:  Oct 16 2019         INTERPRETATION:  CLINICAL INFORMATION: Abdominal pain with history of   rectal cancer in remission. Abdominal distention.    COMPARISON: CT chest, abdomen, and pelvis 1/16/2019 and CT abdomen and   pelvis 12/7/2018.    PROCEDURE:   CT of the Abdomen and Pelvis was performed with intravenous contrast.   Intravenous contrast: 90 ml Omnipaque 350. 10 ml discarded.  Oral contrast: positive contrast was administered.  Sagittal and coronal reformats were performed.    FINDINGS:    LOWER CHEST: Within normal limits.    LIVER: Within normal limits.  BILE DUCTS: Normal caliber.  GALLBLADDER: Within normal limits.  SPLEEN: Within normal limits.  PANCREAS: Within normal limits.  ADRENALS: Within normal limits.  KIDNEYS/URETERS: No hydronephrosis.    BLADDER: Within normal limits.  REPRODUCTIVE ORGANS: Fibroid uterus.    BOWEL: Dilated loops of small bowel with suggestion of two transition   points within the right hemipelvis (series 2 image 85 and series 2 image   79). Additionally, a small bowel loop with decreased wall enhancement   (series 2 image 83) concerning for ischemic bowel. Small amount of   associated mesenteric edema/ascites.  PERITONEUM: Small amount of ascites and mesenteric edema as above.  VESSELS: Normal caliber abdominal aorta.  RETROPERITONEUM/LYMPH NODES: No lymphadenopathy.    ABDOMINAL WALL: Within normal limits.  BONES: Within normal limits.    IMPRESSION:     Small bowel obstruction withsuggestion of two transition points in the   right hemipelvis concerning for closed loop obstruction.    Additional decreased enhancement and wall thickening involving a small   bowel loop in the right lower quadrant with associated mild edema and   ascites; underlying ischemic bowel is not excluded.     Findings were discussed with Dr. Waggoner 10/16/2019 4:19 PM by Dr. Hernandez   with read back confirmation.    MONTSERRAT HERNANDEZ M.D., ATTENDING RADIOLOGIST  This document has been electronically signed. Oct 16 2019  4:21PM

## 2019-10-16 NOTE — ED ADULT TRIAGE NOTE - CHIEF COMPLAINT QUOTE
pt c/o abd pain x 3 days. +nausea. denies vomiting/diarrhea/fever/chills. pt c/o abd pain x 3 days. +nausea. denies vomiting/diarrhea/fever/chills. last BM this morning. +flatus.

## 2019-10-16 NOTE — ED ADULT NURSE NOTE - OBJECTIVE STATEMENT
Alert and oriented x3, complaining of generalized Abd pain w. nausea. Pt has Hx of gastric blockage as per son.

## 2019-10-16 NOTE — H&P ADULT - ASSESSMENT
Very pleasant 62F w/ no PMH other than hx of colon CA, s/p LAR and ileostomy reversal (both in 2017, Dr. Moore), 2 hospitalizations since than due to SBO (once w/ NGT, once without), presents w/ closed loop SBO    - Admit to D Surgery, Dr. Moore  - SICU consulted and accepted for hemodynamic monitoring and possible deterioration of pt's clinical status  - Added on and consented for emergent ex-lap, possible bowel rxn, possible ostomy  - preop Abx  - 2L LR bolus given (2nd bolus running now)  - NPO/IVF  - serial abdominal exam, no pain meds before exam  - Examined w/ senior surgical resident on call, discussed w/ Dr. Oscar Medeiros PGY-2  D Surgery

## 2019-10-16 NOTE — ED ADULT NURSE NOTE - CHIEF COMPLAINT QUOTE
pt c/o abd pain x 3 days. +nausea. denies vomiting/diarrhea/fever/chills. last BM this morning. +flatus.

## 2019-10-16 NOTE — ED PROVIDER NOTE - CLINICAL SUMMARY MEDICAL DECISION MAKING FREE TEXT BOX
64 year old female with PMH of rectal cancer in remission s/p chemo and surgery presents to the ED complaining of abdominal pain since yesterday and associated constipation. Hemodynamically stable. Non-acute abdomen. Concern for bowel obstruction given hx. Plan for labs and CT A/P, IVF, Analgesia, Reassess.

## 2019-10-16 NOTE — PRE-OP CHECKLIST - LAST TOOK
pt states drank water earlier today w few sips during NG tube placement, states no solids intake x 2 days/clears

## 2019-10-16 NOTE — H&P ADULT - NSICDXPASTMEDICALHX_GEN_ALL_CORE_FT
PAST MEDICAL HISTORY:  Colorectal cancer     HTN (hypertension) No meds    SBO (small bowel obstruction) multiple- last 12/2018 - resolved    Thyroid disorder h/o Thyroid disorder- no issues / meds

## 2019-10-16 NOTE — ED ADULT NURSE NOTE - NSIMPLEMENTINTERV_GEN_ALL_ED
Implemented All Universal Safety Interventions:  [place] to call system. Call bell, personal items and telephone within reach. Instruct patient to call for assistance. Room bathroom lighting operational. Non-slip footwear when patient is off stretcher. Physically safe environment: no spills, clutter or unnecessary equipment. Stretcher in lowest position, wheels locked, appropriate side rails in place.

## 2019-10-16 NOTE — ED ADULT NURSE NOTE - CCCP TRG CHIEF CMPLNT
Inpatient Consult to Cardiology  Consult performed by: LUAREN PÉREZ  Consult ordered by: GELY CABRAL Cardiology at [facility]  Cardiovascular Consultation Note         Patient is a 77-year-old male with no known cardiac issues other than trouble with a fast heart rate about 10- 15 years ago in which he was evaluated by a cardiologist (he cannot remember the name) and after being instructed to decrease his caffeine intake his heart rate returned to normal.  He is being referred for new onset atrial fibrillation.  The patient started to experience palpitations last night around 8 PM that prompted him to come to the emergency room.  He did not experience any chest pain or become short of breath and was relatively asymptomatic but wanted to be evaluated.  He was started on a Cardizem drip but was turned off due to a drop in his blood pressure.  According to the nurse the patient has maintained sinus rhythm since he arrived on the floor.  The patient denies any signs or symptoms to suggest a TIA or stroke.  The patient denies chest pain, dyspnea, orthopnea, or syncope he is currently sitting in the chair and in no acute distress.  Normal sinus rhythm with occasional PACs noted on telemetry.  He has a history of rheumatoid arthritis, chronic venous stasis ulcers, hypertension and chronic vertigo.    Cardiac risk factors: Advanced age, hypertension, advanced age, venous stasis    Past medical and surgical history, social and family history reviewed in EMR.    REVIEW OF SYSTEMS:   H&P ROS reviewed and pertinent CV ROS as noted in HPI.         Vital Sign Min/Max for last 24 hours  Temp  Min: 97.4 °F (36.3 °C)  Max: 98.3 °F (36.8 °C)   BP  Min: 73/59  Max: 125/79   Pulse  Min: 91  Max: 159   Resp  Min: 16  Max: 18   SpO2  Min: 70 %  Max: 97 %   Flow (L/min)  Min: 2  Max: 2    No intake or output data in the 24 hours ending 06/18/17 1608        Physical Exam   Constitutional: He is  abd pain oriented to person, place, and time. He appears well-developed and well-nourished.   HENT:   Head: Normocephalic and atraumatic.   Eyes: Pupils are equal, round, and reactive to light. No scleral icterus.   Neck: No JVD present. Carotid bruit is not present. No thyromegaly present.   Cardiovascular: Normal rate, regular rhythm, S1 normal and S2 normal.   Extrasystoles are present. Exam reveals no gallop.    No murmur heard.  Pulmonary/Chest: Effort normal. He has rhonchi in the right middle field, the right lower field, the left upper field and the left middle field.   Abdominal: Soft. There is no tenderness.   Musculoskeletal: He exhibits edema.   Neurological: He is alert and oriented to person, place, and time.   Skin: Skin is warm and dry. No cyanosis. Nails show no clubbing.        Psychiatric: He has a normal mood and affect. His behavior is normal.     EKG:  Atrial Fibrillation  Left anterior fascicular block  Vent. rate 137 BPM  MA interval * ms  QRS duration 102 ms  QT/QTc 328/495 ms    Lab Review:   Labs reviewed in the electronic medical record.  Pertinent findings include:  Lab Results   Component Value Date    GLUCOSE 129 (H) 06/18/2017    BUN 25 (H) 06/18/2017    CREATININE 0.80 06/18/2017    EGFRIFAFRI 113 06/18/2017    BCR 31.3 (H) 06/18/2017    K 3.7 06/18/2017    CO2 30.0 06/18/2017    CALCIUM 9.4 06/18/2017    ALBUMIN 3.40 06/18/2017    LABIL2 1.0 (L) 06/18/2017    AST 22 06/18/2017    ALT 18 06/18/2017     Lab Results   Component Value Date    WBC 14.97 (H) 06/18/2017    HGB 11.2 (L) 06/18/2017    HCT 38.3 (L) 06/18/2017    MCV 91.2 06/18/2017     06/18/2017           Hospital Problem List     * (Principal)Atrial fibrillation with RVR    Overview Signed 6/18/2017  3:59 PM by REEMA Hudson     · Chads Vasc= 4, currently no anticoagulant         Chronic cutaneous venous stasis ulcer    Essential hypertension    Vertigo    Rheumatoid arthritis (Chronic)    Large joint arthralgia of  multiple sites           Episode of apparent paroxysmal atrial fibrillation the setting of multiple medical problems and limited activity schedule.    RECOMMEND:    · Will defer wound care as patient has been actively following wound specialist at [facility].  · Start Eliquis 5 mg twice a day due to elevated chads vasc score  · Start NEBIVOLOL 2.5 mg daily  · EKG now and in the a.m.  · Echocardiogram  · Defer formal antiarrhythmic drug therapy as well as LHC/MPS at this time    The initial assessment has been discussed with the patient and 7 family members in room.    Luci BENAVIDEZ dictating as scribe for Dr. Petar Friedman I, Petar Friedman MD, [facility], personally performed the services described in this documentation as scribed by the above named individual in my presence, and it is both accurate and complete.

## 2019-10-16 NOTE — ED PROVIDER NOTE - OBJECTIVE STATEMENT
64 year old female with PMH of rectal cancer in remission s/p chemo and surgery presents to the ED complaining of abdominal pain since yesterday. Pt pain is diffuse, constant, dull, no relieving or aggravating factors. Pt reports associated nausea and no bowel movement for 3 days. Pt denies vomiting, diarrhea, melena, hematochezia, fever, chills, urinary symptoms. Pt denies any other complaints.

## 2019-10-16 NOTE — BRIEF OPERATIVE NOTE - OPERATION/FINDINGS
Exploratory laparotomy with extensive lysis of adhesions for closed loop bowel obstruction. Multiple points of obstruction encountered. Bowel congested but relieved with adhesiolysis, no ichemia noted. Small bowel resection and anaplasmosis due to extensive serosal tears on small segment of bowel mobilized from pelvis.

## 2019-10-16 NOTE — H&P ADULT - NSHPPHYSICALEXAM_GEN_ALL_CORE
General: Writhing in pain  HEENT: NC/AT, EOMI  Neck: Soft, supple  Cardio: RRR, nml S1/S2  Resp: Good effort, CTA b/l  GI/Abd: Soft, moderately distended, severe tenderness diffusely, no guarding, well healed midline incision  Skin: Intact, no breakdown  Lymphatic/Nodes: No palpable lymphadenopathy  Musculoskeletal: All 4 extremities moving spontaneously, no limitations

## 2019-10-16 NOTE — BRIEF OPERATIVE NOTE - NSICDXBRIEFPROCEDURE_GEN_ALL_CORE_FT
PROCEDURES:  Small bowel resection with anastomosis 16-Oct-2019 21:57:23  Adamaris Higuera  Lysis of iris adhesions 16-Oct-2019 21:57:18  Adamaris Higuera  Exploratory laparotomy 16-Oct-2019 21:57:12  Adamaris Higuera

## 2019-10-17 DIAGNOSIS — Z71.89 OTHER SPECIFIED COUNSELING: ICD-10-CM

## 2019-10-17 LAB
ANION GAP SERPL CALC-SCNC: 8 MMO/L — SIGNIFICANT CHANGE UP (ref 7–14)
ANION GAP SERPL CALC-SCNC: 9 MMO/L — SIGNIFICANT CHANGE UP (ref 7–14)
BUN SERPL-MCNC: 13 MG/DL — SIGNIFICANT CHANGE UP (ref 7–23)
BUN SERPL-MCNC: 13 MG/DL — SIGNIFICANT CHANGE UP (ref 7–23)
CALCIUM SERPL-MCNC: 7.8 MG/DL — LOW (ref 8.4–10.5)
CALCIUM SERPL-MCNC: 8 MG/DL — LOW (ref 8.4–10.5)
CHLORIDE SERPL-SCNC: 100 MMOL/L — SIGNIFICANT CHANGE UP (ref 98–107)
CHLORIDE SERPL-SCNC: 103 MMOL/L — SIGNIFICANT CHANGE UP (ref 98–107)
CO2 SERPL-SCNC: 24 MMOL/L — SIGNIFICANT CHANGE UP (ref 22–31)
CO2 SERPL-SCNC: 27 MMOL/L — SIGNIFICANT CHANGE UP (ref 22–31)
CREAT SERPL-MCNC: 0.48 MG/DL — LOW (ref 0.5–1.3)
CREAT SERPL-MCNC: 0.54 MG/DL — SIGNIFICANT CHANGE UP (ref 0.5–1.3)
GLUCOSE SERPL-MCNC: 129 MG/DL — HIGH (ref 70–99)
GLUCOSE SERPL-MCNC: 160 MG/DL — HIGH (ref 70–99)
HCT VFR BLD CALC: 37 % — SIGNIFICANT CHANGE UP (ref 34.5–45)
HCV AB S/CO SERPL IA: 0.13 S/CO — SIGNIFICANT CHANGE UP (ref 0–0.99)
HCV AB SERPL-IMP: SIGNIFICANT CHANGE UP
HGB BLD-MCNC: 12.2 G/DL — SIGNIFICANT CHANGE UP (ref 11.5–15.5)
MAGNESIUM SERPL-MCNC: 1.5 MG/DL — LOW (ref 1.6–2.6)
MAGNESIUM SERPL-MCNC: 2.2 MG/DL — SIGNIFICANT CHANGE UP (ref 1.6–2.6)
MCHC RBC-ENTMCNC: 31.1 PG — SIGNIFICANT CHANGE UP (ref 27–34)
MCHC RBC-ENTMCNC: 33 % — SIGNIFICANT CHANGE UP (ref 32–36)
MCV RBC AUTO: 94.4 FL — SIGNIFICANT CHANGE UP (ref 80–100)
NRBC # FLD: 0 K/UL — SIGNIFICANT CHANGE UP (ref 0–0)
PHOSPHATE SERPL-MCNC: 1.8 MG/DL — LOW (ref 2.5–4.5)
PHOSPHATE SERPL-MCNC: 2.8 MG/DL — SIGNIFICANT CHANGE UP (ref 2.5–4.5)
PLATELET # BLD AUTO: 215 K/UL — SIGNIFICANT CHANGE UP (ref 150–400)
PMV BLD: 8.8 FL — SIGNIFICANT CHANGE UP (ref 7–13)
POTASSIUM SERPL-MCNC: 3.6 MMOL/L — SIGNIFICANT CHANGE UP (ref 3.5–5.3)
POTASSIUM SERPL-MCNC: 3.8 MMOL/L — SIGNIFICANT CHANGE UP (ref 3.5–5.3)
POTASSIUM SERPL-SCNC: 3.6 MMOL/L — SIGNIFICANT CHANGE UP (ref 3.5–5.3)
POTASSIUM SERPL-SCNC: 3.8 MMOL/L — SIGNIFICANT CHANGE UP (ref 3.5–5.3)
RBC # BLD: 3.92 M/UL — SIGNIFICANT CHANGE UP (ref 3.8–5.2)
RBC # FLD: 13.2 % — SIGNIFICANT CHANGE UP (ref 10.3–14.5)
SODIUM SERPL-SCNC: 135 MMOL/L — SIGNIFICANT CHANGE UP (ref 135–145)
SODIUM SERPL-SCNC: 136 MMOL/L — SIGNIFICANT CHANGE UP (ref 135–145)
WBC # BLD: 9.48 K/UL — SIGNIFICANT CHANGE UP (ref 3.8–10.5)
WBC # FLD AUTO: 9.48 K/UL — SIGNIFICANT CHANGE UP (ref 3.8–10.5)

## 2019-10-17 RX ORDER — ACETAMINOPHEN 500 MG
750 TABLET ORAL ONCE
Refills: 0 | Status: COMPLETED | OUTPATIENT
Start: 2019-10-17 | End: 2019-10-18

## 2019-10-17 RX ORDER — POTASSIUM CHLORIDE 20 MEQ
10 PACKET (EA) ORAL
Refills: 0 | Status: COMPLETED | OUTPATIENT
Start: 2019-10-17 | End: 2019-10-17

## 2019-10-17 RX ORDER — KETOROLAC TROMETHAMINE 30 MG/ML
15 SYRINGE (ML) INJECTION EVERY 6 HOURS
Refills: 0 | Status: DISCONTINUED | OUTPATIENT
Start: 2019-10-17 | End: 2019-10-22

## 2019-10-17 RX ORDER — ACETAMINOPHEN 500 MG
750 TABLET ORAL ONCE
Refills: 0 | Status: COMPLETED | OUTPATIENT
Start: 2019-10-18 | End: 2019-10-18

## 2019-10-17 RX ORDER — HYDROMORPHONE HYDROCHLORIDE 2 MG/ML
0.5 INJECTION INTRAMUSCULAR; INTRAVENOUS; SUBCUTANEOUS EVERY 4 HOURS
Refills: 0 | Status: DISCONTINUED | OUTPATIENT
Start: 2019-10-17 | End: 2019-10-22

## 2019-10-17 RX ORDER — MAGNESIUM SULFATE 500 MG/ML
2 VIAL (ML) INJECTION ONCE
Refills: 0 | Status: COMPLETED | OUTPATIENT
Start: 2019-10-17 | End: 2019-10-17

## 2019-10-17 RX ORDER — HYDROMORPHONE HYDROCHLORIDE 2 MG/ML
1 INJECTION INTRAMUSCULAR; INTRAVENOUS; SUBCUTANEOUS EVERY 4 HOURS
Refills: 0 | Status: DISCONTINUED | OUTPATIENT
Start: 2019-10-17 | End: 2019-10-22

## 2019-10-17 RX ADMIN — SODIUM CHLORIDE 125 MILLILITER(S): 9 INJECTION, SOLUTION INTRAVENOUS at 14:46

## 2019-10-17 RX ADMIN — Medication 750 MILLIGRAM(S): at 15:30

## 2019-10-17 RX ADMIN — HYDROMORPHONE HYDROCHLORIDE 30 MILLILITER(S): 2 INJECTION INTRAMUSCULAR; INTRAVENOUS; SUBCUTANEOUS at 07:10

## 2019-10-17 RX ADMIN — Medication 300 MILLIGRAM(S): at 04:15

## 2019-10-17 RX ADMIN — Medication 100 MILLIEQUIVALENT(S): at 12:41

## 2019-10-17 RX ADMIN — Medication 15 MILLIGRAM(S): at 17:46

## 2019-10-17 RX ADMIN — Medication 300 MILLIGRAM(S): at 14:45

## 2019-10-17 RX ADMIN — Medication 50 GRAM(S): at 12:42

## 2019-10-17 RX ADMIN — ENOXAPARIN SODIUM 40 MILLIGRAM(S): 100 INJECTION SUBCUTANEOUS at 12:46

## 2019-10-17 RX ADMIN — Medication 15 MILLIGRAM(S): at 18:00

## 2019-10-17 NOTE — PROVIDER CONTACT NOTE (OTHER) - ACTION/TREATMENT ORDERED:
Team aware and at bedside, ordered to not give remaining potassium run/IV Tylenol ordered. PCA d/c'd as pt. refuses to push button. BMP drawn as per orders. Will continue to monitor.

## 2019-10-17 NOTE — PROGRESS NOTE ADULT - SUBJECTIVE AND OBJECTIVE BOX
Vital Signs Last 24 Hrs  T(C): 37.3 (17 Oct 2019 15:39), Max: 37.3 (17 Oct 2019 05:44)  T(F): 99.1 (17 Oct 2019 15:39), Max: 99.2 (17 Oct 2019 05:44)  HR: 81 (17 Oct 2019 15:39) (70 - 81)  BP: 115/56 (17 Oct 2019 15:39) (115/56 - 197/73)  BP(mean): 86 (17 Oct 2019 02:15) (79 - 97)  RR: 18 (17 Oct 2019 15:39) (12 - 20)  SpO2: 97% (17 Oct 2019 15:39) (95% - 100%)    I&O's Detail    16 Oct 2019 07:01  -  17 Oct 2019 07:00  --------------------------------------------------------  IN:    lactated ringers.: 625 mL  Total IN: 625 mL    OUT:    Indwelling Catheter - Urethral: 650 mL    Nasoenteral Tube: 200 mL  Total OUT: 850 mL    Total NET: -225 mL                                12.2   9.48  )-----------( 215      ( 17 Oct 2019 05:44 )             37.0       10-17    135  |  103  |  13  ----------------------------<  160<H>  3.6   |  24  |  0.48<L>    Ca    7.8<L>      17 Oct 2019 05:44  Phos  2.8     10-17  Mg     1.5     10-17    TPro  7.1  /  Alb  4.4  /  TBili  1.0  /  DBili  x   /  AST  26  /  ALT  16  /  AlkPhos  33<L>  10-16      PT/INR - ( 16 Oct 2019 17:25 )   PT: 10.5 SEC;   INR: 0.95          PTT - ( 16 Oct 2019 17:25 )  PTT:28.0 SEC    Dressing dry  Baker out  Pt. is ambulating    PLAN:  Awaiting GI function  Continue N-G drainage for now

## 2019-10-17 NOTE — PROGRESS NOTE ADULT - SUBJECTIVE AND OBJECTIVE BOX
Anesthesia Pain Management Service    SUBJECTIVE: Patient is doing well with IV PCA and no significant problems reported.    Pain Scale Score	At rest: __2_ 	With Activity: ___ 	[X ] Refer to charted pain scores    THERAPY:    [ ] IV PCA Morphine		[ ] 5 mg/mL	[ ] 1 mg/mL  [X ] IV PCA Hydromorphone	[ ] 5 mg/mL	[X ] 1 mg/mL  [ ] IV PCA Fentanyl		[ ] 50 micrograms/mL    Demand dose __0.2_ lockout __6_ (minutes) Continuous Rate _0__ Total: _0.8_  mg used (in past 24 hours)      MEDICATIONS  (STANDING):  acetaminophen  IVPB .. 750 milliGRAM(s) IV Intermittent once  acetaminophen  IVPB .. 750 milliGRAM(s) IV Intermittent once  enoxaparin Injectable 40 milliGRAM(s) SubCutaneous daily  HYDROmorphone PCA (1 mG/mL) 30 milliLiter(s) PCA Continuous PCA Continuous  lactated ringers. 1000 milliLiter(s) (125 mL/Hr) IV Continuous <Continuous>  magnesium sulfate  IVPB 2 Gram(s) IV Intermittent once  potassium chloride  10 mEq/100 mL IVPB 10 milliEquivalent(s) IV Intermittent every 1 hour    MEDICATIONS  (PRN):  HYDROmorphone PCA (1 mG/mL) Rescue Clinician Bolus 0.5 milliGRAM(s) IV Push every 15 minutes PRN for Pain Scale GREATER THAN 6  naloxone Injectable 0.1 milliGRAM(s) IV Push every 3 minutes PRN For ANY of the following changes in patient status:  A. RR LESS THAN 10 breaths per minute, B. Oxygen saturation LESS THAN 90%, C. Sedation score of 6  ondansetron Injectable 4 milliGRAM(s) IV Push every 6 hours PRN Nausea      OBJECTIVE: laying in bed     Sedation Score:	[ X] Alert	[ ] Drowsy 	[ ] Arousable	[ ] Asleep	[ ] Unresponsive    Side Effects:	[X ] None	[ ] Nausea	[ ] Vomiting	[ ] Pruritus  		[ ] Other:    Vital Signs Last 24 Hrs  T(C): 37.3 (17 Oct 2019 05:44), Max: 37.3 (17 Oct 2019 05:44)  T(F): 99.2 (17 Oct 2019 05:44), Max: 99.2 (17 Oct 2019 05:44)  HR: 71 (17 Oct 2019 05:44) (66 - 80)  BP: 119/57 (17 Oct 2019 05:44) (119/57 - 204/78)  BP(mean): 86 (17 Oct 2019 02:15) (79 - 97)  RR: 18 (17 Oct 2019 05:44) (12 - 20)  SpO2: 97% (17 Oct 2019 05:44) (95% - 100%)    ASSESSMENT/ PLAN    Therapy to  be:	[ X] Continue   [ ] Discontinued   [ ] Change to prn Analgesics    Documentation and Verification of current medications:   [X] Done	[ ] Not done, not elligible    Comments: NPO with NGT, continue cureent therapy     .

## 2019-10-17 NOTE — PHYSICAL THERAPY INITIAL EVALUATION ADULT - ADDITIONAL COMMENTS
Pt. was left seated in chair post PT Evaluation, no apparent distress, all lines intact, son present. RN aware of pt. status and participation in PT.

## 2019-10-17 NOTE — PHYSICAL THERAPY INITIAL EVALUATION ADULT - GENERAL OBSERVATIONS, REHAB EVAL
Consult received, chart reviewed. Patient received seated in chair, no apparent distress, +NGT. Patient agreed to Evaluation from Physical Therapist.

## 2019-10-17 NOTE — CHART NOTE - NSCHARTNOTEFT_GEN_A_CORE
STATUS POST:  ex lap IRLANDA SBR     POST OPERATIVE DAY #: 0    SUBJECTIVE: Pt seen on the floor  NGT in place  PCA pump  no amb, void,  or PO tolerance  -/-      Vital Signs Last 24 Hrs  T(C): 37.3 (17 Oct 2019 05:44), Max: 37.3 (17 Oct 2019 05:44)  T(F): 99.2 (17 Oct 2019 05:44), Max: 99.2 (17 Oct 2019 05:44)  HR: 71 (17 Oct 2019 05:44) (66 - 80)  BP: 119/57 (17 Oct 2019 05:44) (119/57 - 204/78)  BP(mean): 86 (17 Oct 2019 02:15) (79 - 97)  RR: 18 (17 Oct 2019 05:44) (12 - 20)  SpO2: 97% (17 Oct 2019 05:44) (95% - 100%)  I&O's Summary    16 Oct 2019 07:01  -  17 Oct 2019 07:00  --------------------------------------------------------  IN: 625 mL / OUT: 850 mL / NET: -225 mL      I&O's Detail    16 Oct 2019 07:01  -  17 Oct 2019 07:00  --------------------------------------------------------  IN:    lactated ringers.: 625 mL  Total IN: 625 mL    OUT:    Indwelling Catheter - Urethral: 650 mL    Nasoenteral Tube: 200 mL  Total OUT: 850 mL    Total NET: -225 mL          MEDICATIONS  (STANDING):  acetaminophen  IVPB .. 750 milliGRAM(s) IV Intermittent once  acetaminophen  IVPB .. 750 milliGRAM(s) IV Intermittent once  enoxaparin Injectable 40 milliGRAM(s) SubCutaneous daily  HYDROmorphone PCA (1 mG/mL) 30 milliLiter(s) PCA Continuous PCA Continuous  lactated ringers. 1000 milliLiter(s) (125 mL/Hr) IV Continuous <Continuous>  magnesium sulfate  IVPB 2 Gram(s) IV Intermittent once  potassium chloride  10 mEq/100 mL IVPB 10 milliEquivalent(s) IV Intermittent every 1 hour    MEDICATIONS  (PRN):  HYDROmorphone PCA (1 mG/mL) Rescue Clinician Bolus 0.5 milliGRAM(s) IV Push every 15 minutes PRN for Pain Scale GREATER THAN 6  naloxone Injectable 0.1 milliGRAM(s) IV Push every 3 minutes PRN For ANY of the following changes in patient status:  A. RR LESS THAN 10 breaths per minute, B. Oxygen saturation LESS THAN 90%, C. Sedation score of 6  ondansetron Injectable 4 milliGRAM(s) IV Push every 6 hours PRN Nausea      LABS:                        12.2   9.48  )-----------( 215      ( 17 Oct 2019 05:44 )             37.0     10-17    135  |  103  |  13  ----------------------------<  160<H>  3.6   |  24  |  0.48<L>    Ca    7.8<L>      17 Oct 2019 05:44  Phos  2.8     10-17  Mg     1.5     10-17    TPro  7.1  /  Alb  4.4  /  TBili  1.0  /  DBili  x   /  AST  26  /  ALT  16  /  AlkPhos  33<L>  10-16    PT/INR - ( 16 Oct 2019 17:25 )   PT: 10.5 SEC;   INR: 0.95          PTT - ( 16 Oct 2019 17:25 )  PTT:28.0 SEC      RADIOLOGY & ADDITIONAL STUDIES:    PHYSICAL EXAM:    General: NAD, NGT in place  Resp: breathing comfortably  CV: appears well perfused  Ab: soft, TTP midline incision, nondistended  Ext: no pedal edema      A/P: 64y Female s/p   - Diet: NPO NGT IVF  - Activity: as tolerated  - Labs: f/u AM labs  - Pain medication  - DVT ppx

## 2019-10-17 NOTE — PROGRESS NOTE ADULT - SUBJECTIVE AND OBJECTIVE BOX
ANESTHESIA POSTOP CHECK    64y Female POSTOP DAY 1 S/P     Vital Signs Last 24 Hrs  T(C): 36.4 (17 Oct 2019 12:01), Max: 37.3 (17 Oct 2019 05:44)  T(F): 97.5 (17 Oct 2019 12:01), Max: 99.2 (17 Oct 2019 05:44)  HR: 79 (17 Oct 2019 12:01) (70 - 79)  BP: 119/62 (17 Oct 2019 12:01) (119/57 - 197/73)  BP(mean): 86 (17 Oct 2019 02:15) (79 - 97)  RR: 18 (17 Oct 2019 12:01) (12 - 20)  SpO2: 99% (17 Oct 2019 12:01) (95% - 100%)  I&O's Summary    16 Oct 2019 07:01  -  17 Oct 2019 07:00  --------------------------------------------------------  IN: 625 mL / OUT: 850 mL / NET: -225 mL        [X ] NO APPARENT ANESTHESIA COMPLICATIONS      Comments:

## 2019-10-17 NOTE — PROGRESS NOTE ADULT - SUBJECTIVE AND OBJECTIVE BOX
Anesthesia Pain Management Service    SUBJECTIVE: Pt now off IV PCA without problems reported.    Therapy:	  [ X] IV PCA	   [ ] Epidural           [ ] s/p Spinal Opoid              [ ] Postpartum infusion	  [ ] Patient controlled regional anesthesia (PCRA)    [ ] prn Analgesics    Allergies    No Known Allergies    Intolerances      MEDICATIONS  (STANDING):  acetaminophen  IVPB .. 750 milliGRAM(s) IV Intermittent once  acetaminophen  IVPB .. 750 milliGRAM(s) IV Intermittent once  enoxaparin Injectable 40 milliGRAM(s) SubCutaneous daily  ketorolac   Injectable 15 milliGRAM(s) IV Push every 6 hours  lactated ringers. 1000 milliLiter(s) (125 mL/Hr) IV Continuous <Continuous>  potassium chloride  10 mEq/100 mL IVPB 10 milliEquivalent(s) IV Intermittent every 1 hour    MEDICATIONS  (PRN):  HYDROmorphone  Injectable 0.5 milliGRAM(s) IV Push every 4 hours PRN Moderate Pain (4 - 6)  HYDROmorphone  Injectable 1 milliGRAM(s) IV Push every 4 hours PRN Severe Pain (7 - 10)      OBJECTIVE:   [X] No new signs     [ ] Other:    Side Effects:  [X ] None			[ ] Other:    Assessment of Catheter Site:		[ ] Intact		[ ] Other:    ASSESSMENT/PLAN  [ ] Continue current therapy    [X ] Therapy changed to:    [ ] IV PCA       [ ] Epidural     [ X] prn Analgesics     Comments: PRN Oral/IV opioids and/or non-opioid adjuvant analgesics to be used at this point.    Progress Note written now but Patient was seen earlier.

## 2019-10-17 NOTE — PHYSICAL THERAPY INITIAL EVALUATION ADULT - CRITERIA FOR SKILLED THERAPEUTIC INTERVENTIONS
anticipated discharge recommendation/rehab potential/therapy frequency/predicted duration of therapy intervention/impairments found

## 2019-10-17 NOTE — PROGRESS NOTE ADULT - SUBJECTIVE AND OBJECTIVE BOX
Surgery Progress Note    S: Patient seen and examined. Tolerating pain. Denies fever, chills, SOB, chest pain.     O:  Physical Exam:  Gen: Laying in bed, NAD  HEENT: atrumatic, EMOI  Resp: Unlabored breathing  Abd: soft, nontender, nondistended, no rebound or guarding.    Ext: Moves 4 extremities spontaneously    Vital Signs Last 24 Hrs  T(C): 37.3 (17 Oct 2019 05:44), Max: 37.3 (17 Oct 2019 05:44)  T(F): 99.2 (17 Oct 2019 05:44), Max: 99.2 (17 Oct 2019 05:44)  HR: 71 (17 Oct 2019 05:44) (66 - 80)  BP: 119/57 (17 Oct 2019 05:44) (119/57 - 204/78)  BP(mean): 86 (17 Oct 2019 02:15) (79 - 97)  RR: 18 (17 Oct 2019 05:44) (12 - 20)  SpO2: 97% (17 Oct 2019 05:44) (95% - 100%)    I&O's Detail    16 Oct 2019 07:01  -  17 Oct 2019 07:00  --------------------------------------------------------  IN:    lactated ringers.: 625 mL  Total IN: 625 mL    OUT:    Indwelling Catheter - Urethral: 650 mL    Nasoenteral Tube: 200 mL  Total OUT: 850 mL    Total NET: -225 mL                                12.2   9.48  )-----------( 215      ( 17 Oct 2019 05:44 )             37.0       10-17    135  |  103  |  13  ----------------------------<  160<H>  3.6   |  24  |  0.48<L>    Ca    7.8<L>      17 Oct 2019 05:44  Phos  2.8     10-17  Mg     1.5     10-17    TPro  7.1  /  Alb  4.4  /  TBili  1.0  /  DBili  x   /  AST  26  /  ALT  16  /  AlkPhos  33<L>  10-16

## 2019-10-17 NOTE — PHYSICAL THERAPY INITIAL EVALUATION ADULT - PERTINENT HX OF CURRENT PROBLEM, REHAB EVAL
Per documentation, pt. admitted for abdominal pain, found to have closed loop bowel obstruction. Now s/p small bowel resection with primary anastomosis POD#1.

## 2019-10-18 LAB
ANION GAP SERPL CALC-SCNC: 10 MMO/L — SIGNIFICANT CHANGE UP (ref 7–14)
BUN SERPL-MCNC: 16 MG/DL — SIGNIFICANT CHANGE UP (ref 7–23)
CALCIUM SERPL-MCNC: 8 MG/DL — LOW (ref 8.4–10.5)
CHLORIDE SERPL-SCNC: 102 MMOL/L — SIGNIFICANT CHANGE UP (ref 98–107)
CO2 SERPL-SCNC: 28 MMOL/L — SIGNIFICANT CHANGE UP (ref 22–31)
CREAT SERPL-MCNC: 0.57 MG/DL — SIGNIFICANT CHANGE UP (ref 0.5–1.3)
GLUCOSE SERPL-MCNC: 91 MG/DL — SIGNIFICANT CHANGE UP (ref 70–99)
HCT VFR BLD CALC: 30 % — LOW (ref 34.5–45)
HGB BLD-MCNC: 9.7 G/DL — LOW (ref 11.5–15.5)
MAGNESIUM SERPL-MCNC: 2 MG/DL — SIGNIFICANT CHANGE UP (ref 1.6–2.6)
MCHC RBC-ENTMCNC: 30.6 PG — SIGNIFICANT CHANGE UP (ref 27–34)
MCHC RBC-ENTMCNC: 32.3 % — SIGNIFICANT CHANGE UP (ref 32–36)
MCV RBC AUTO: 94.6 FL — SIGNIFICANT CHANGE UP (ref 80–100)
NRBC # FLD: 0 K/UL — SIGNIFICANT CHANGE UP (ref 0–0)
PHOSPHATE SERPL-MCNC: 1.6 MG/DL — LOW (ref 2.5–4.5)
PLATELET # BLD AUTO: 176 K/UL — SIGNIFICANT CHANGE UP (ref 150–400)
PMV BLD: 8.9 FL — SIGNIFICANT CHANGE UP (ref 7–13)
POTASSIUM SERPL-MCNC: 3.4 MMOL/L — LOW (ref 3.5–5.3)
POTASSIUM SERPL-SCNC: 3.4 MMOL/L — LOW (ref 3.5–5.3)
RBC # BLD: 3.17 M/UL — LOW (ref 3.8–5.2)
RBC # FLD: 13.4 % — SIGNIFICANT CHANGE UP (ref 10.3–14.5)
SODIUM SERPL-SCNC: 140 MMOL/L — SIGNIFICANT CHANGE UP (ref 135–145)
WBC # BLD: 6.78 K/UL — SIGNIFICANT CHANGE UP (ref 3.8–10.5)
WBC # FLD AUTO: 6.78 K/UL — SIGNIFICANT CHANGE UP (ref 3.8–10.5)

## 2019-10-18 RX ORDER — POTASSIUM CHLORIDE 20 MEQ
10 PACKET (EA) ORAL
Refills: 0 | Status: DISCONTINUED | OUTPATIENT
Start: 2019-10-18 | End: 2019-10-18

## 2019-10-18 RX ORDER — SODIUM CHLORIDE 9 MG/ML
1000 INJECTION, SOLUTION INTRAVENOUS
Refills: 0 | Status: DISCONTINUED | OUTPATIENT
Start: 2019-10-18 | End: 2019-10-22

## 2019-10-18 RX ORDER — SODIUM CHLORIDE 9 MG/ML
1000 INJECTION, SOLUTION INTRAVENOUS
Refills: 0 | Status: DISCONTINUED | OUTPATIENT
Start: 2019-10-18 | End: 2019-10-18

## 2019-10-18 RX ORDER — ACETAMINOPHEN 500 MG
750 TABLET ORAL EVERY 6 HOURS
Refills: 0 | Status: COMPLETED | OUTPATIENT
Start: 2019-10-18 | End: 2019-10-18

## 2019-10-18 RX ADMIN — Medication 300 MILLIGRAM(S): at 03:59

## 2019-10-18 RX ADMIN — Medication 15 MILLIGRAM(S): at 12:18

## 2019-10-18 RX ADMIN — ENOXAPARIN SODIUM 40 MILLIGRAM(S): 100 INJECTION SUBCUTANEOUS at 12:18

## 2019-10-18 RX ADMIN — Medication 750 MILLIGRAM(S): at 04:30

## 2019-10-18 RX ADMIN — Medication 15 MILLIGRAM(S): at 23:47

## 2019-10-18 RX ADMIN — Medication 300 MILLIGRAM(S): at 23:48

## 2019-10-18 NOTE — PROGRESS NOTE ADULT - SUBJECTIVE AND OBJECTIVE BOX
Surgery Progress Note    S: Patient seen and examined. Pain controlled on IV Tylenol. No additional heat/hot flashes after potassium discontinued. No acute events overnight     O:    Vital Signs Last 24 Hrs  T(C): 36.7 (10-18-19 @ 09:32), Max: 37.8 (10-17-19 @ 20:34)  HR: 72 (10-18-19 @ 09:32) (72 - 89)  BP: 122/55 (10-18-19 @ 09:32) (107/49 - 128/52)  ABP: --  ABP(mean): --  RR: 20 (10-18-19 @ 09:32) (18 - 20)  SpO2: 97% (10-18-19 @ 09:32) (95% - 100%)  Wt(kg): --  CVP(mm Hg): --      10-17 @ 07:01  -  10-18 @ 07:00  --------------------------------------------------------  IN:  Total IN: 0 mL    OUT:    Nasoenteral Tube: 240 mL    Voided: 990 mL  Total OUT: 1230 mL    Total NET: -1230 mL          Physical Exam:  Gen: Laying in bed, NAD  HEENT: atraumatic   Resp: Unlabored breathing  Abd: softly distended, some tenderness to palpation. No rebound or guarding.  -/-  Ext: Moves 4 extremities spontaneously      LABS:    CBC (10-18 @ 06:30)                              9.7<L>                         6.78    )----------------(  176        --    % Neutrophils, --    % Lymphocytes, ANC: --                                  30.0<L>  CBC (10-17 @ 05:44)                              12.2                           9.48    )----------------(  215        --    % Neutrophils, --    % Lymphocytes, ANC: --                                  37.0      BMP (10-18 @ 06:30)             140     |  102     |  16    		Ca++ --      Ca 8.0<L>             ---------------------------------( 91    		Mg 2.0                3.4<L>  |  28      |  0.57  			Ph 1.6<L>  [facility] (10-17 @ 16:04)             136     |  100     |  13    		Ca++ --      Ca 8.0<L>             ---------------------------------( 129<H>		Mg 2.2                3.8     |  27      |  0.54  			Ph 1.8<L>

## 2019-10-19 LAB
ANION GAP SERPL CALC-SCNC: 10 MMO/L — SIGNIFICANT CHANGE UP (ref 7–14)
BUN SERPL-MCNC: 12 MG/DL — SIGNIFICANT CHANGE UP (ref 7–23)
CALCIUM SERPL-MCNC: 8.2 MG/DL — LOW (ref 8.4–10.5)
CHLORIDE SERPL-SCNC: 103 MMOL/L — SIGNIFICANT CHANGE UP (ref 98–107)
CO2 SERPL-SCNC: 28 MMOL/L — SIGNIFICANT CHANGE UP (ref 22–31)
CREAT SERPL-MCNC: 0.47 MG/DL — LOW (ref 0.5–1.3)
GLUCOSE SERPL-MCNC: 121 MG/DL — HIGH (ref 70–99)
HCT VFR BLD CALC: 30.5 % — LOW (ref 34.5–45)
HGB BLD-MCNC: 9.4 G/DL — LOW (ref 11.5–15.5)
MAGNESIUM SERPL-MCNC: 2 MG/DL — SIGNIFICANT CHANGE UP (ref 1.6–2.6)
MCHC RBC-ENTMCNC: 30.1 PG — SIGNIFICANT CHANGE UP (ref 27–34)
MCHC RBC-ENTMCNC: 30.8 % — LOW (ref 32–36)
MCV RBC AUTO: 97.8 FL — SIGNIFICANT CHANGE UP (ref 80–100)
NRBC # FLD: 0 K/UL — SIGNIFICANT CHANGE UP (ref 0–0)
PHOSPHATE SERPL-MCNC: 1.6 MG/DL — LOW (ref 2.5–4.5)
PLATELET # BLD AUTO: 172 K/UL — SIGNIFICANT CHANGE UP (ref 150–400)
PMV BLD: 8.9 FL — SIGNIFICANT CHANGE UP (ref 7–13)
POTASSIUM SERPL-MCNC: 3.5 MMOL/L — SIGNIFICANT CHANGE UP (ref 3.5–5.3)
POTASSIUM SERPL-SCNC: 3.5 MMOL/L — SIGNIFICANT CHANGE UP (ref 3.5–5.3)
RBC # BLD: 3.12 M/UL — LOW (ref 3.8–5.2)
RBC # FLD: 13.3 % — SIGNIFICANT CHANGE UP (ref 10.3–14.5)
SODIUM SERPL-SCNC: 141 MMOL/L — SIGNIFICANT CHANGE UP (ref 135–145)
WBC # BLD: 5.86 K/UL — SIGNIFICANT CHANGE UP (ref 3.8–10.5)
WBC # FLD AUTO: 5.86 K/UL — SIGNIFICANT CHANGE UP (ref 3.8–10.5)

## 2019-10-19 PROCEDURE — 71045 X-RAY EXAM CHEST 1 VIEW: CPT | Mod: 26

## 2019-10-19 RX ORDER — POTASSIUM CHLORIDE 20 MEQ
10 PACKET (EA) ORAL ONCE
Refills: 0 | Status: DISCONTINUED | OUTPATIENT
Start: 2019-10-19 | End: 2019-10-19

## 2019-10-19 RX ORDER — ACETAMINOPHEN 500 MG
750 TABLET ORAL ONCE
Refills: 0 | Status: COMPLETED | OUTPATIENT
Start: 2019-10-19 | End: 2019-10-19

## 2019-10-19 RX ORDER — POTASSIUM CHLORIDE 20 MEQ
10 PACKET (EA) ORAL ONCE
Refills: 0 | Status: COMPLETED | OUTPATIENT
Start: 2019-10-19 | End: 2019-10-19

## 2019-10-19 RX ADMIN — Medication 750 MILLIGRAM(S): at 00:18

## 2019-10-19 RX ADMIN — Medication 15 MILLIGRAM(S): at 17:27

## 2019-10-19 RX ADMIN — Medication 300 MILLIGRAM(S): at 21:44

## 2019-10-19 RX ADMIN — Medication 100 MILLIEQUIVALENT(S): at 12:23

## 2019-10-19 RX ADMIN — Medication 750 MILLIGRAM(S): at 22:15

## 2019-10-19 RX ADMIN — SODIUM CHLORIDE 100 MILLILITER(S): 9 INJECTION, SOLUTION INTRAVENOUS at 12:23

## 2019-10-19 RX ADMIN — Medication 15 MILLIGRAM(S): at 00:17

## 2019-10-19 RX ADMIN — Medication 85 MILLIMOLE(S): at 12:23

## 2019-10-19 RX ADMIN — ENOXAPARIN SODIUM 40 MILLIGRAM(S): 100 INJECTION SUBCUTANEOUS at 12:23

## 2019-10-19 RX ADMIN — Medication 15 MILLIGRAM(S): at 10:07

## 2019-10-19 NOTE — CHART NOTE - NSCHARTNOTEFT_GEN_A_CORE
Contacted by RN who reported patient returned from a walk and NGT had slid out of the nose by about 20-30cm. Patient seen and examined at bedside. Denies n/v. Pain controlled. Abdomen soft, mildly tender, nondistended. NGT taped to nose with area of insertion point marked by RN with orange tape. NGT was advanced 25cm with immediate return of bilious output. CXR ordered to confirm placement.

## 2019-10-19 NOTE — PROGRESS NOTE ADULT - SUBJECTIVE AND OBJECTIVE BOX
Surgery Progress Note    S: Patient seen and examined. No acute events overnight. Reports tolerating diet without nausea, vomiting, passing flatus, having bowel movements, voiding without issues, have been ambulating and out of bed. Denies fever, chills, SOB, chest pain.     O:  Physical Exam:  Gen: Laying in bed, NAD  HEENT: atrumatic, EMOI  Resp: Unlabored breathing  Abd: soft, candy-incisional tenderness, nondistended, no rebound or guarding. Drains serosanguinous   Ext: Moves 4 extremities spontaneously    Vital Signs Last 24 Hrs  T(C): 36.9 (19 Oct 2019 12:35), Max: 37.7 (18 Oct 2019 19:52)  T(F): 98.4 (19 Oct 2019 12:35), Max: 99.9 (18 Oct 2019 19:52)  HR: 75 (19 Oct 2019 12:35) (75 - 81)  BP: 130/62 (19 Oct 2019 12:35) (113/53 - 141/58)  BP(mean): --  RR: 18 (19 Oct 2019 12:35) (18 - 19)  SpO2: 98% (19 Oct 2019 12:35) (96% - 100%)    I&O's Detail    18 Oct 2019 07:01  -  19 Oct 2019 07:00  --------------------------------------------------------  IN:  Total IN: 0 mL    OUT:    Nasoenteral Tube: 1100 mL    Voided: 1660 mL  Total OUT: 2760 mL    Total NET: -2760 mL      19 Oct 2019 07:01  -  19 Oct 2019 15:54  --------------------------------------------------------  IN:    dextrose 5% + sodium chloride 0.45%: 900 mL    IV PiggyBack: 600 mL  Total IN: 1500 mL    OUT:    Nasoenteral Tube: 450 mL    Voided: 900 mL  Total OUT: 1350 mL    Total NET: 150 mL                                9.4    5.86  )-----------( 172      ( 19 Oct 2019 06:00 )             30.5       10-19    141  |  103  |  12  ----------------------------<  121<H>  3.5   |  28  |  0.47<L>    Ca    8.2<L>      19 Oct 2019 06:00  Phos  1.6     10-19  Mg     2.0     10-19

## 2019-10-20 LAB
ANION GAP SERPL CALC-SCNC: 10 MMO/L — SIGNIFICANT CHANGE UP (ref 7–14)
BUN SERPL-MCNC: 7 MG/DL — SIGNIFICANT CHANGE UP (ref 7–23)
CALCIUM SERPL-MCNC: 8 MG/DL — LOW (ref 8.4–10.5)
CHLORIDE SERPL-SCNC: 104 MMOL/L — SIGNIFICANT CHANGE UP (ref 98–107)
CO2 SERPL-SCNC: 27 MMOL/L — SIGNIFICANT CHANGE UP (ref 22–31)
CREAT SERPL-MCNC: 0.43 MG/DL — LOW (ref 0.5–1.3)
GLUCOSE SERPL-MCNC: 132 MG/DL — HIGH (ref 70–99)
HCT VFR BLD CALC: 25.9 % — LOW (ref 34.5–45)
HCT VFR BLD CALC: 26.2 % — LOW (ref 34.5–45)
HGB BLD-MCNC: 8.4 G/DL — LOW (ref 11.5–15.5)
HGB BLD-MCNC: 8.5 G/DL — LOW (ref 11.5–15.5)
MAGNESIUM SERPL-MCNC: 1.9 MG/DL — SIGNIFICANT CHANGE UP (ref 1.6–2.6)
MCHC RBC-ENTMCNC: 30.8 PG — SIGNIFICANT CHANGE UP (ref 27–34)
MCHC RBC-ENTMCNC: 30.9 PG — SIGNIFICANT CHANGE UP (ref 27–34)
MCHC RBC-ENTMCNC: 32.4 % — SIGNIFICANT CHANGE UP (ref 32–36)
MCHC RBC-ENTMCNC: 32.4 % — SIGNIFICANT CHANGE UP (ref 32–36)
MCV RBC AUTO: 94.9 FL — SIGNIFICANT CHANGE UP (ref 80–100)
MCV RBC AUTO: 95.3 FL — SIGNIFICANT CHANGE UP (ref 80–100)
NRBC # FLD: 0 K/UL — SIGNIFICANT CHANGE UP (ref 0–0)
NRBC # FLD: 0 K/UL — SIGNIFICANT CHANGE UP (ref 0–0)
PHOSPHATE SERPL-MCNC: 2.3 MG/DL — LOW (ref 2.5–4.5)
PLATELET # BLD AUTO: 190 K/UL — SIGNIFICANT CHANGE UP (ref 150–400)
PLATELET # BLD AUTO: 203 K/UL — SIGNIFICANT CHANGE UP (ref 150–400)
PMV BLD: 8.6 FL — SIGNIFICANT CHANGE UP (ref 7–13)
PMV BLD: 8.9 FL — SIGNIFICANT CHANGE UP (ref 7–13)
POTASSIUM SERPL-MCNC: 3.6 MMOL/L — SIGNIFICANT CHANGE UP (ref 3.5–5.3)
POTASSIUM SERPL-SCNC: 3.6 MMOL/L — SIGNIFICANT CHANGE UP (ref 3.5–5.3)
RBC # BLD: 2.73 M/UL — LOW (ref 3.8–5.2)
RBC # BLD: 2.75 M/UL — LOW (ref 3.8–5.2)
RBC # FLD: 13.2 % — SIGNIFICANT CHANGE UP (ref 10.3–14.5)
RBC # FLD: 13.2 % — SIGNIFICANT CHANGE UP (ref 10.3–14.5)
SODIUM SERPL-SCNC: 141 MMOL/L — SIGNIFICANT CHANGE UP (ref 135–145)
WBC # BLD: 4.49 K/UL — SIGNIFICANT CHANGE UP (ref 3.8–10.5)
WBC # BLD: 4.61 K/UL — SIGNIFICANT CHANGE UP (ref 3.8–10.5)
WBC # FLD AUTO: 4.49 K/UL — SIGNIFICANT CHANGE UP (ref 3.8–10.5)
WBC # FLD AUTO: 4.61 K/UL — SIGNIFICANT CHANGE UP (ref 3.8–10.5)

## 2019-10-20 RX ORDER — ACETAMINOPHEN 500 MG
750 TABLET ORAL EVERY 6 HOURS
Refills: 0 | Status: COMPLETED | OUTPATIENT
Start: 2019-10-20 | End: 2019-10-21

## 2019-10-20 RX ORDER — POTASSIUM PHOSPHATE, MONOBASIC POTASSIUM PHOSPHATE, DIBASIC 236; 224 MG/ML; MG/ML
15 INJECTION, SOLUTION INTRAVENOUS ONCE
Refills: 0 | Status: COMPLETED | OUTPATIENT
Start: 2019-10-20 | End: 2019-10-20

## 2019-10-20 RX ORDER — PANTOPRAZOLE SODIUM 20 MG/1
40 TABLET, DELAYED RELEASE ORAL DAILY
Refills: 0 | Status: DISCONTINUED | OUTPATIENT
Start: 2019-10-20 | End: 2019-10-22

## 2019-10-20 RX ADMIN — SODIUM CHLORIDE 100 MILLILITER(S): 9 INJECTION, SOLUTION INTRAVENOUS at 08:44

## 2019-10-20 RX ADMIN — POTASSIUM PHOSPHATE, MONOBASIC POTASSIUM PHOSPHATE, DIBASIC 62.5 MILLIMOLE(S): 236; 224 INJECTION, SOLUTION INTRAVENOUS at 08:44

## 2019-10-20 RX ADMIN — ENOXAPARIN SODIUM 40 MILLIGRAM(S): 100 INJECTION SUBCUTANEOUS at 12:21

## 2019-10-20 RX ADMIN — PANTOPRAZOLE SODIUM 40 MILLIGRAM(S): 20 TABLET, DELAYED RELEASE ORAL at 12:21

## 2019-10-20 RX ADMIN — Medication 15 MILLIGRAM(S): at 06:00

## 2019-10-20 RX ADMIN — Medication 15 MILLIGRAM(S): at 17:08

## 2019-10-20 RX ADMIN — Medication 300 MILLIGRAM(S): at 13:51

## 2019-10-20 RX ADMIN — Medication 15 MILLIGRAM(S): at 12:21

## 2019-10-20 RX ADMIN — Medication 300 MILLIGRAM(S): at 19:59

## 2019-10-20 RX ADMIN — Medication 750 MILLIGRAM(S): at 20:40

## 2019-10-20 RX ADMIN — Medication 15 MILLIGRAM(S): at 05:31

## 2019-10-20 RX ADMIN — SODIUM CHLORIDE 100 MILLILITER(S): 9 INJECTION, SOLUTION INTRAVENOUS at 19:55

## 2019-10-20 NOTE — PROGRESS NOTE ADULT - SUBJECTIVE AND OBJECTIVE BOX
GENERAL SURGERY PROGRESS NOTE    STATUS POST:  ex lap IRLANDA SBR  POST OPERATIVE DAY #: 3      SUBJECTIVE  +/-  notes some pain    OVERNIGHT EVENTS:  EMILIE  10-point review of systems completed and negative except as noted above.      OBJECTIVE    MEDICATIONS  acetaminophen  IVPB .. 750 milliGRAM(s) IV Intermittent every 6 hours  dextrose 5% + sodium chloride 0.45% 1000 milliLiter(s) IV Continuous <Continuous>  enoxaparin Injectable 40 milliGRAM(s) SubCutaneous daily  HYDROmorphone  Injectable 0.5 milliGRAM(s) IV Push every 4 hours PRN  HYDROmorphone  Injectable 1 milliGRAM(s) IV Push every 4 hours PRN  ketorolac   Injectable 15 milliGRAM(s) IV Push every 6 hours  pantoprazole  Injectable 40 milliGRAM(s) IV Push daily      PHYSICAL EXAM  T(C): 36.9 (10-20-19 @ 12:17), Max: 37.1 (10-19-19 @ 21:34)  HR: 72 (10-20-19 @ 12:17) (66 - 75)  BP: 144/57 (10-20-19 @ 12:17) (128/59 - 144/57)  RR: 18 (10-20-19 @ 12:17) (16 - 19)  SpO2: 96% (10-20-19 @ 12:17) (96% - 100%)    10-19-19 @ 07:01  -  10-20-19 @ 07:00  --------------------------------------------------------  IN: 2700 mL / OUT: 2600 mL / NET: 100 mL    10-20-19 @ 07:01  -  10-20-19 @ 14:01  --------------------------------------------------------  IN: 850 mL / OUT: 650 mL / NET: 200 mL        General: Appears well, NAD  Neuro: AAOx3  CHEST: Clear to auscultation bilaterally  CV: Regular rate and rhythm  Abdomen: soft, TTP LLQ, nondistended, no rebound or guarding, staples c/d/i midline  Extremities: Grossly symmetric    LABS                        8.4    4.49  )-----------( 190      ( 20 Oct 2019 05:35 )             25.9     10-20    141  |  104  |  7   ----------------------------<  132<H>  3.6   |  27  |  0.43<L>    Ca    8.0<L>      20 Oct 2019 05:35  Phos  2.3     10-20  Mg     1.9     10-20            RADIOLOGY & ADDITIONAL STUDIES

## 2019-10-21 ENCOUNTER — TRANSCRIPTION ENCOUNTER (OUTPATIENT)
Age: 64
End: 2019-10-21

## 2019-10-21 LAB
ANION GAP SERPL CALC-SCNC: 7 MMO/L — SIGNIFICANT CHANGE UP (ref 7–14)
BUN SERPL-MCNC: 5 MG/DL — LOW (ref 7–23)
CALCIUM SERPL-MCNC: 8.4 MG/DL — SIGNIFICANT CHANGE UP (ref 8.4–10.5)
CHLORIDE SERPL-SCNC: 104 MMOL/L — SIGNIFICANT CHANGE UP (ref 98–107)
CO2 SERPL-SCNC: 27 MMOL/L — SIGNIFICANT CHANGE UP (ref 22–31)
CREAT SERPL-MCNC: 0.44 MG/DL — LOW (ref 0.5–1.3)
GLUCOSE SERPL-MCNC: 118 MG/DL — HIGH (ref 70–99)
HCT VFR BLD CALC: 26.7 % — LOW (ref 34.5–45)
HGB BLD-MCNC: 8.6 G/DL — LOW (ref 11.5–15.5)
MAGNESIUM SERPL-MCNC: 1.9 MG/DL — SIGNIFICANT CHANGE UP (ref 1.6–2.6)
MCHC RBC-ENTMCNC: 30.6 PG — SIGNIFICANT CHANGE UP (ref 27–34)
MCHC RBC-ENTMCNC: 32.2 % — SIGNIFICANT CHANGE UP (ref 32–36)
MCV RBC AUTO: 95 FL — SIGNIFICANT CHANGE UP (ref 80–100)
NRBC # FLD: 0 K/UL — SIGNIFICANT CHANGE UP (ref 0–0)
PHOSPHATE SERPL-MCNC: 2.7 MG/DL — SIGNIFICANT CHANGE UP (ref 2.5–4.5)
PLATELET # BLD AUTO: 218 K/UL — SIGNIFICANT CHANGE UP (ref 150–400)
PMV BLD: 8.8 FL — SIGNIFICANT CHANGE UP (ref 7–13)
POTASSIUM SERPL-MCNC: 3.7 MMOL/L — SIGNIFICANT CHANGE UP (ref 3.5–5.3)
POTASSIUM SERPL-SCNC: 3.7 MMOL/L — SIGNIFICANT CHANGE UP (ref 3.5–5.3)
RBC # BLD: 2.81 M/UL — LOW (ref 3.8–5.2)
RBC # FLD: 13 % — SIGNIFICANT CHANGE UP (ref 10.3–14.5)
SODIUM SERPL-SCNC: 138 MMOL/L — SIGNIFICANT CHANGE UP (ref 135–145)
WBC # BLD: 4.08 K/UL — SIGNIFICANT CHANGE UP (ref 3.8–10.5)
WBC # FLD AUTO: 4.08 K/UL — SIGNIFICANT CHANGE UP (ref 3.8–10.5)

## 2019-10-21 RX ORDER — POTASSIUM PHOSPHATE, MONOBASIC POTASSIUM PHOSPHATE, DIBASIC 236; 224 MG/ML; MG/ML
15 INJECTION, SOLUTION INTRAVENOUS ONCE
Refills: 0 | Status: COMPLETED | OUTPATIENT
Start: 2019-10-21 | End: 2019-10-21

## 2019-10-21 RX ORDER — MAGNESIUM SULFATE 500 MG/ML
1 VIAL (ML) INJECTION ONCE
Refills: 0 | Status: COMPLETED | OUTPATIENT
Start: 2019-10-21 | End: 2019-10-21

## 2019-10-21 RX ADMIN — SODIUM CHLORIDE 50 MILLILITER(S): 9 INJECTION, SOLUTION INTRAVENOUS at 19:24

## 2019-10-21 RX ADMIN — SODIUM CHLORIDE 100 MILLILITER(S): 9 INJECTION, SOLUTION INTRAVENOUS at 09:33

## 2019-10-21 RX ADMIN — Medication 15 MILLIGRAM(S): at 00:02

## 2019-10-21 RX ADMIN — ENOXAPARIN SODIUM 40 MILLIGRAM(S): 100 INJECTION SUBCUTANEOUS at 12:50

## 2019-10-21 RX ADMIN — Medication 300 MILLIGRAM(S): at 08:00

## 2019-10-21 RX ADMIN — Medication 15 MILLIGRAM(S): at 18:30

## 2019-10-21 RX ADMIN — POTASSIUM PHOSPHATE, MONOBASIC POTASSIUM PHOSPHATE, DIBASIC 62.5 MILLIMOLE(S): 236; 224 INJECTION, SOLUTION INTRAVENOUS at 09:33

## 2019-10-21 RX ADMIN — Medication 300 MILLIGRAM(S): at 02:46

## 2019-10-21 RX ADMIN — Medication 15 MILLIGRAM(S): at 12:49

## 2019-10-21 RX ADMIN — Medication 15 MILLIGRAM(S): at 05:42

## 2019-10-21 RX ADMIN — PANTOPRAZOLE SODIUM 40 MILLIGRAM(S): 20 TABLET, DELAYED RELEASE ORAL at 12:50

## 2019-10-21 RX ADMIN — SODIUM CHLORIDE 50 MILLILITER(S): 9 INJECTION, SOLUTION INTRAVENOUS at 12:50

## 2019-10-21 RX ADMIN — Medication 100 GRAM(S): at 09:32

## 2019-10-21 RX ADMIN — Medication 750 MILLIGRAM(S): at 03:09

## 2019-10-21 NOTE — DISCHARGE NOTE PROVIDER - CARE PROVIDER_API CALL
Kory Moore)  Surgery  [address] [place] [address]  Phone: [phone]  Fax: [phone]  Follow Up Time: 1 week

## 2019-10-21 NOTE — DISCHARGE NOTE PROVIDER - HOSPITAL COURSE
62F w/ no PMH other than hx of colon CA, s/p LAR and ileostomy reversal (both in 2017, ), 2 hospitalizations since than due to SBO (once w/ NGT, once without), presents today after 2 days of worsening abd pain, nausea w/o vomiting. Pt states that she had dinner on Monday night and went to bed, and overnight started to have sharp pain diffusely in the abdomen. Next day, pain gradually worsened and she also started to feel nauseated, prompting her son to bring her into [facility] Wednesday. Of note, pt last passed gas on Tuesday AM, and had BM also on Tuesday        In ED, pt's not tachycardic, but SBP initialy was in 200's most likely due to severe pain, physical exam significant for soft, moderately distended, severely tender diffusely, no guarding, well healed midline incision noted. Labs not significant, no leukocytosis, lactate of 1.8, mildly increased creatinine to 1.6, CTAP obtained in ED shows closed loop SBO w/ 2 transition points        NGT was inserted while pt was in the ED, w/ immediate output of about 350cc        Pt went emergently to OR on 10/16 for Exploratory laparotomy, IRLANDA and Small bowel resection with anastomosis. Pt tolerated procedure well. Postoperatively pt had PCA pump for pain control and NGT. On 10/17 pts barakat was removed and she passed a TOV. She was OOB and ambulating with PT who recommended home with no needs. Her PCA was discontinued and her pain was controlled on IV pain medication.         10/20 pt passing flatus        10/21 pt NGT was removed. Her diet was advanced as tolerated.         At this time, pt is tolerating a regular diet, ambulating and voiding.  Pt has been deemed stable for discharge at this time by attending 62F w/ PMH of colon CA, s/p LAR and ileostomy reversal (both in 2017, ), 2 hospitalizations since then due to SBO (once w/ NGT, once without), presents to [facility] 10/16/19 after 2 days of worsening abd pain, nausea w/o vomiting. Pt states that she had dinner on Monday night prior to admission and went to bed, and overnight started to have sharp pain diffusely in the abdomen. Day of admission pain gradually worsened and she also started to feel nauseated, prompting her son to bring her into [facility]. Of note, pt last passed gas and flatus day prior to admission.         In ED, pt's not tachycardic, but SBP initialy was in 200's most likely due to severe pain, physical exam significant for soft, moderately distended, severely tender diffusely, no guarding, well healed midline incision noted. Labs not significant, no leukocytosis, lactate of 1.8, mildly increased creatinine to 1.6, CTAP obtained in ED shows closed loop SBO w/ 2 transition points        NGT was inserted while pt was in the ED, w/ immediate output of about 350cc        Pt went emergently to OR on 10/16 for Exploratory laparotomy, IRLANDA and Small bowel resection with anastomosis. Pt tolerated procedure well. Postoperatively pt had PCA pump for pain control and NGT.         10/17 Baker was removed and she passed a TOV. She was OOB and ambulating with PT who recommended home with no needs. Her PCA was discontinued and her pain was controlled on IV pain medication.         10/20 pt passing flatus        10/21 pt NGT was removed. Her diet was advanced as tolerated.         At this time, pt is tolerating a regular diet, ambulating and voiding.  Pt has been deemed stable for discharge at this time by attending. 62F w/ PMH of colon CA, s/p LAR and ileostomy reversal (both in 2017, ), 2 hospitalizations since then due to SBO (once w/ NGT, once without), presents to [facility] 10/16/19 after 2 days of worsening abd pain, nausea w/o vomiting. Pt states that she had dinner on Monday night prior to admission and went to bed, and overnight started to have sharp pain diffusely in the abdomen. Day of admission pain gradually worsened and she also started to feel nauseated, prompting her son to bring her into [facility]. Of note, pt last passed gas and flatus day prior to admission.         In ED, pt's not tachycardic, but SBP initialy was in 200's most likely due to severe pain, physical exam significant for soft, moderately distended, severely tender diffusely, no guarding, well healed midline incision noted. Labs not significant, no leukocytosis, lactate of 1.8, mildly increased creatinine to 1.6, CTAP obtained in ED shows closed loop SBO w/ 2 transition points        NGT was inserted while pt was in the ED, w/ immediate output of about 350cc        Pt went emergently to OR on 10/16 for Exploratory laparotomy, IRLANDA and Small bowel resection with anastomosis. Pt tolerated procedure well. Postoperatively pt had PCA pump for pain control and NGT.         10/17 Baker was removed and she passed a TOV. She was OOB and ambulating with PT who recommended home with no needs. Her PCA was discontinued and her pain was controlled on IV pain medication.         10/20 Patient had return of GI function.         10/21 pt NGT was removed. Her diet was advanced as tolerated.         At this time, pt is tolerating a regular diet, ambulating and voiding.  Pt has been deemed stable for discharge at this time by attending.         istop #: [id number]

## 2019-10-21 NOTE — PROGRESS NOTE ADULT - SUBJECTIVE AND OBJECTIVE BOX
GENERAL SURGERY PROGRESS NOTE    STATUS POST:  ex lap IRLANDA SBR  POST OPERATIVE DAY #: 4      SUBJECTIVE  +/-, NGT in place  notes some abdominal pain, but controlled  EMILIE    10-point review of systems completed and negative except as noted above.      Objective:  Vital Signs Last 24 Hrs  T(C): 36.9 (21 Oct 2019 05:41), Max: 37.1 (21 Oct 2019 01:39)  T(F): 98.5 (21 Oct 2019 05:41), Max: 98.7 (21 Oct 2019 01:39)  HR: 68 (21 Oct 2019 05:41) (64 - 72)  BP: 146/66 (21 Oct 2019 05:41) (134/61 - 148/65)  BP(mean): --  RR: 17 (21 Oct 2019 05:41) (16 - 18)  SpO2: 98% (21 Oct 2019 05:41) (96% - 100%)    I&O's Detail    20 Oct 2019 07:01  -  21 Oct 2019 07:00  --------------------------------------------------------  IN:    dextrose 5% + sodium chloride 0.45%: 2200 mL    IV PiggyBack: 250 mL  Total IN: 2450 mL    OUT:    Nasoenteral Tube: 400 mL    Voided: 2200 mL  Total OUT: 2600 mL    Total NET: -150 mL          MEDICATIONS  (STANDING):  acetaminophen  IVPB .. 750 milliGRAM(s) IV Intermittent every 6 hours  dextrose 5% + sodium chloride 0.45% 1000 milliLiter(s) (100 mL/Hr) IV Continuous <Continuous>  enoxaparin Injectable 40 milliGRAM(s) SubCutaneous daily  ketorolac   Injectable 15 milliGRAM(s) IV Push every 6 hours  pantoprazole  Injectable 40 milliGRAM(s) IV Push daily    MEDICATIONS  (PRN):  HYDROmorphone  Injectable 0.5 milliGRAM(s) IV Push every 4 hours PRN Moderate Pain (4 - 6)  HYDROmorphone  Injectable 1 milliGRAM(s) IV Push every 4 hours PRN Severe Pain (7 - 10)                            8.5    4.61  )-----------( 203      ( 20 Oct 2019 14:26 )             26.2       10-20    141  |  104  |  7   ----------------------------<  132<H>  3.6   |  27  |  0.43<L>    Ca    8.0<L>      20 Oct 2019 05:35  Phos  2.3     10-20  Mg     1.9     10-20        PHYSICAL EXAM    General: Appears well, NAD  Neuro: AAOx3  Abdomen: soft,  appropriately TTP, nondistended, no rebound or guarding, staples c/d/i midline  Extremities: Grossly symmetric

## 2019-10-21 NOTE — DISCHARGE NOTE PROVIDER - NSDCCPCAREPLAN_GEN_ALL_CORE_FT
PRINCIPAL DISCHARGE DIAGNOSIS  Diagnosis: SBO (small bowel obstruction)  Assessment and Plan of Treatment: multiple- last 12/2018 - resolved Patient is 18 years or older

## 2019-10-21 NOTE — DISCHARGE NOTE PROVIDER - NSDCFUADDINST_GEN_ALL_CORE_FT
WOUND CARE:  Please keep incisions clean and dry. Please do not Scrub or rub incisions. Do not use lotion or powder on incisions.     BATHING: You may shower and/or sponge bathe. You may use warm soapy water in the shower and rinse, pat dry.    ACTIVITY: No heavy lifting or straining. Otherwise, you may return to your usual level of physical activity. If you are taking narcotic pain medication DO NOT drive a car, operate machinery or make important decisions.    DIET: Low fiber diet     NOTIFY YOUR SURGEON IF: You have any bleeding that does not stop, any pus draining from your wound(s), increased pain at surgical site, any fever (over 100.4 F) persistent nausea/vomiting, or if your pain is not controlled on your discharge pain medications.  Please follow up with your primary care physician in 1-2 weeks regarding your hospitalization.  Please follow up with your surgeon,  WOUND CARE:  Please keep incisions clean and dry. Please do not Scrub or rub incisions. Do not use lotion or powder on incisions.     BATHING: You may shower and/or sponge bathe. You may use warm soapy water in the shower and rinse, pat dry.    ACTIVITY: No heavy lifting or straining. Otherwise, you may return to your usual level of physical activity. If you are taking narcotic pain medication DO NOT drive a car, operate machinery or make important decisions.    DIET: Low fiber diet     NOTIFY YOUR SURGEON IF: You have any bleeding that does not stop, any pus draining from your wound(s), increased pain at surgical site, any fever (over 100.4 F) persistent nausea/vomiting, or if your pain is not controlled on your discharge pain medications.  Please follow up with your primary care physician in 1-2 weeks regarding your hospitalization.  Please follow up with your surgeon, Dr. Moore in 1 week. Please call office to make an appointment.

## 2019-10-21 NOTE — DISCHARGE NOTE PROVIDER - NSDCFUSCHEDAPPT_GEN_ALL_CORE_FT
JOCE EMMANUEL ; 11/06/2019 ; [facility]urg[facility] JOCE EMMANUEL ; 11/06/2019 ; [facility] Surgon[facility] [address] [facility] JOCE EMMANUEL ; 11/06/2019 ; [facility] Surgon[facility] [address] [facility] JOCE EMMANUEL ; 11/06/2019 ; NPP Surgonc [address] [facility]

## 2019-10-22 RX ORDER — PANTOPRAZOLE SODIUM 20 MG/1
40 TABLET, DELAYED RELEASE ORAL
Refills: 0 | Status: DISCONTINUED | OUTPATIENT
Start: 2019-10-22 | End: 2019-10-23

## 2019-10-22 RX ORDER — OXYCODONE HYDROCHLORIDE 5 MG/1
5 TABLET ORAL EVERY 4 HOURS
Refills: 0 | Status: DISCONTINUED | OUTPATIENT
Start: 2019-10-22 | End: 2019-10-23

## 2019-10-22 RX ORDER — ACETAMINOPHEN 500 MG
650 TABLET ORAL EVERY 6 HOURS
Refills: 0 | Status: DISCONTINUED | OUTPATIENT
Start: 2019-10-22 | End: 2019-10-23

## 2019-10-22 RX ORDER — OXYCODONE HYDROCHLORIDE 5 MG/1
10 TABLET ORAL EVERY 4 HOURS
Refills: 0 | Status: DISCONTINUED | OUTPATIENT
Start: 2019-10-22 | End: 2019-10-23

## 2019-10-22 RX ADMIN — Medication 15 MILLIGRAM(S): at 06:10

## 2019-10-22 RX ADMIN — PANTOPRAZOLE SODIUM 40 MILLIGRAM(S): 20 TABLET, DELAYED RELEASE ORAL at 08:55

## 2019-10-22 RX ADMIN — Medication 15 MILLIGRAM(S): at 01:00

## 2019-10-22 RX ADMIN — Medication 15 MILLIGRAM(S): at 05:35

## 2019-10-22 RX ADMIN — OXYCODONE HYDROCHLORIDE 5 MILLIGRAM(S): 5 TABLET ORAL at 22:12

## 2019-10-22 RX ADMIN — OXYCODONE HYDROCHLORIDE 10 MILLIGRAM(S): 5 TABLET ORAL at 16:02

## 2019-10-22 RX ADMIN — OXYCODONE HYDROCHLORIDE 5 MILLIGRAM(S): 5 TABLET ORAL at 23:02

## 2019-10-22 RX ADMIN — Medication 15 MILLIGRAM(S): at 00:39

## 2019-10-22 RX ADMIN — OXYCODONE HYDROCHLORIDE 10 MILLIGRAM(S): 5 TABLET ORAL at 16:29

## 2019-10-22 NOTE — PROGRESS NOTE ADULT - SUBJECTIVE AND OBJECTIVE BOX
GENERAL SURGERY PROGRESS NOTE    STATUS POST:  ex lap IRLANDA SBR  POST OPERATIVE DAY #: 5      SUBJECTIVE  +/+, NGT clamped and pulled yest.,  Tolerating regular diet  No acute o/n events    10-point review of systems completed and negative except as noted above.      Objective:  Vital Signs Last 24 Hrs  T(C): 37.2 (22 Oct 2019 08:43), Max: 37.2 (22 Oct 2019 00:22)  T(F): 98.9 (22 Oct 2019 08:43), Max: 99 (22 Oct 2019 05:33)  HR: 70 (22 Oct 2019 08:43) (66 - 73)  BP: 136/62 (22 Oct 2019 08:43) (117/61 - 141/58)  BP(mean): --  RR: 16 (22 Oct 2019 08:43) (16 - 20)  SpO2: 99% (22 Oct 2019 08:43) (98% - 100%)    I&O's Detail    21 Oct 2019 07:01  -  22 Oct 2019 07:00  --------------------------------------------------------  IN:    dextrose 5% + sodium chloride 0.45%: 1350 mL    IV PiggyBack: 425 mL    Oral Fluid: 210 mL  Total IN: 1985 mL    OUT:    Voided: 2650 mL  Total OUT: 2650 mL    Total NET: -665 mL      MEDICATIONS  (STANDING):  enoxaparin Injectable 40 milliGRAM(s) SubCutaneous daily  pantoprazole    Tablet 40 milliGRAM(s) Oral before breakfast    MEDICATIONS  (PRN):  acetaminophen   Tablet .. 650 milliGRAM(s) Oral every 6 hours PRN Mild Pain (1 - 3)  oxyCODONE    IR 10 milliGRAM(s) Oral every 4 hours PRN Severe Pain (7 - 10)  oxyCODONE    IR 5 milliGRAM(s) Oral every 4 hours PRN Moderate Pain (4 - 6)                            8.6    4.08  )-----------( 218      ( 21 Oct 2019 05:50 )             26.7       10-21    138  |  104  |  5<L>  ----------------------------<  118<H>  3.7   |  27  |  0.44<L>    Ca    8.4      21 Oct 2019 05:50  Phos  2.7     10-21  Mg     1.9     10-21        PHYSICAL EXAM    General: Appears well, NAD  Neuro: AAOx3  Abdomen: soft,  appropriately TTP, nondistended, no rebound or guarding, staples c/d/i midline  Extremities: Grossly symmetric

## 2019-10-22 NOTE — PROGRESS NOTE ADULT - SUBJECTIVE AND OBJECTIVE BOX
Vital Signs Last 24 Hrs  T(C): 37.2 (22 Oct 2019 05:33), Max: 37.2 (22 Oct 2019 00:22)  T(F): 99 (22 Oct 2019 05:33), Max: 99 (22 Oct 2019 05:33)  HR: 66 (22 Oct 2019 05:33) (65 - 73)  BP: 135/57 (22 Oct 2019 05:33) (117/61 - 141/58)  BP(mean): --  RR: 19 (22 Oct 2019 05:33) (18 - 20)  SpO2: 99% (22 Oct 2019 05:33) (98% - 100%)    I&O's Detail    21 Oct 2019 07:01  -  22 Oct 2019 07:00  --------------------------------------------------------  IN:    dextrose 5% + sodium chloride 0.45%: 1350 mL    IV PiggyBack: 425 mL    Oral Fluid: 210 mL  Total IN: 1985 mL    OUT:    Voided: 2650 mL  Total OUT: 2650 mL    Total NET: -665 mL                                8.6    4.08  )-----------( 218      ( 21 Oct 2019 05:50 )             26.7       10-21    138  |  104  |  5<L>  ----------------------------<  118<H>  3.7   |  27  |  0.44<L>    Ca    8.4      21 Oct 2019 05:50  Phos  2.7     10-21  Mg     1.9     10-21    incision clear  Tolerating clear liquid diet            PLAN:  Regular diet  Ambulate  probable discharge home tomorrow

## 2019-10-23 ENCOUNTER — TRANSCRIPTION ENCOUNTER (OUTPATIENT)
Age: 64
End: 2019-10-23

## 2019-10-23 VITALS
RESPIRATION RATE: 18 BRPM | SYSTOLIC BLOOD PRESSURE: 120 MMHG | HEART RATE: 64 BPM | TEMPERATURE: 99 F | DIASTOLIC BLOOD PRESSURE: 52 MMHG | OXYGEN SATURATION: 98 %

## 2019-10-23 LAB — SURGICAL PATHOLOGY STUDY: SIGNIFICANT CHANGE UP

## 2019-10-23 PROCEDURE — 99238 HOSP IP/OBS DSCHRG MGMT 30/<: CPT

## 2019-10-23 RX ORDER — OXYCODONE HYDROCHLORIDE 5 MG/1
1 TABLET ORAL
Qty: 8 | Refills: 0
Start: 2019-10-23 | End: 2019-10-24

## 2019-10-23 RX ORDER — ACETAMINOPHEN 500 MG
2 TABLET ORAL
Qty: 0 | Refills: 0 | DISCHARGE
Start: 2019-10-23

## 2019-10-23 RX ADMIN — Medication 650 MILLIGRAM(S): at 05:17

## 2019-10-23 RX ADMIN — PANTOPRAZOLE SODIUM 40 MILLIGRAM(S): 20 TABLET, DELAYED RELEASE ORAL at 05:16

## 2019-10-23 RX ADMIN — Medication 650 MILLIGRAM(S): at 06:05

## 2019-10-23 NOTE — PROGRESS NOTE ADULT - ASSESSMENT
64 F hx of colon CA, s/p LAR and ileostomy reversal and recurrent SBO, presented with closed loop bowel obstruction now s/p SBR with primary anastomosis on 10/16    - D/c barakat  - MIVF  - PT  - RTC IV tylenol  - OOB/amb    D Team Surgery #[id number]
64 F hx of colon CA, s/p LAR and ileostomy reversal and recurrent SBO, presented with closed loop bowel obstruction now s/p SBR with primary anastomosis on 10/16    - NGT clamp tiral today @ 10AM,, monitor bowel fxn, possibly d/c if output continues to be low  - adv. diet as tolerated after ngt pull  - maintenance fluid while NPO  - continue to monitor electrolytes and supplement PRN   - PT/OOB  - started on protonix, continue  - RTC IV Tylenol for pain       D Team Surgery #[id number]
64 F hx of colon CA, s/p LAR and ileostomy reversal and recurrent SBO, presented with closed loop bowel obstruction now s/p SBR with primary anastomosis on 10/16    -await return of bowel function- continue NGT to LCWS   -maintenance fluid while NPO  -continue to monitor electrolytes and supplement PRN   -continue IVF  - PT/OOB  - RTC IV Tylenol for pain       D Team Surgery #[id number]
64 F hx of colon CA, s/p LAR and ileostomy reversal and recurrent SBO, presented with closed loop bowel obstruction now s/p SBR with primary anastomosis on 10/16    -await return of bowel function- continue NGT to LCWS   -maintenance fluid while NPO  -continue to monitor electrolytes and supplement PRN   -continue IVF  - PT/OOB  - RTC IV Tylenol for pain       D Team Surgery #[id number]
64 F hx of colon CA, s/p LAR and ileostomy reversal and recurrent SBO, presented with closed loop bowel obstruction now s/p SBR with primary anastomosis on 10/16    -continue NGT to LCWS , monitor bowel fxn  -maintenance fluid while NPO  -continue to monitor electrolytes and supplement PRN   -continue IVF  - PT/OOB  - RTC IV Tylenol for pain       D Team Surgery #[id number]
64 F hx of colon CA, s/p LAR and ileostomy reversal and recurrent SBO, presented with closed loop bowel obstruction now s/p SBR with primary anastomosis on 10/16. Doing well and ready for discharge tomorrow.    - NGT pulled, diet advanced, tolerating regular diet  - D/c'd IV meds, switched to PO meds  - IVL  - continue to monitor electrolytes and supplement PRN   - PT/OOB  - started on protonix, continue  - Tylenol for pain   - D/c home tomorrow      D Team Surgery #[id number]
64 F hx of colon CA, s/p LAR and ileostomy reversal and recurrent SBO, presented with closed loop bowel obstruction now s/p SBR with primary anastomosis on 10/16. Doing well and ready for discharge.    - PT/OOB  - c/w protonix   - Tylenol for pain   - D/c home       D Team Surgery #[id number]

## 2019-10-23 NOTE — DISCHARGE NOTE NURSING/CASE MANAGEMENT/SOCIAL WORK - PATIENT PORTAL LINK FT
You can access the FollowMyHealth Patient Portal offered by [facility] by registering at the following website: [email or web address]. By joining [employer]’s FollowMyHealth portal, you will also be able to view your health information using other applications (apps) compatible with our system.

## 2019-10-23 NOTE — PROGRESS NOTE ADULT - SUBJECTIVE AND OBJECTIVE BOX
SURGERY DAILY PROGRESS NOTE:     SUBJECTIVE/24hr Events:     Patient seen and examined on am rounds.      OBJECTIVE:    MEDICATIONS  (STANDING):  enoxaparin Injectable 40 milliGRAM(s) SubCutaneous daily  pantoprazole    Tablet 40 milliGRAM(s) Oral before breakfast    MEDICATIONS  (PRN):  acetaminophen   Tablet .. 650 milliGRAM(s) Oral every 6 hours PRN Mild Pain (1 - 3)  oxyCODONE    IR 10 milliGRAM(s) Oral every 4 hours PRN Severe Pain (7 - 10)  oxyCODONE    IR 5 milliGRAM(s) Oral every 4 hours PRN Moderate Pain (4 - 6)      Vital Signs Last 24 Hrs  T(C): 37 (23 Oct 2019 07:44), Max: 37.3 (23 Oct 2019 00:11)  T(F): 98.6 (23 Oct 2019 07:44), Max: 99.2 (23 Oct 2019 00:11)  HR: 64 (23 Oct 2019 07:44) (64 - 72)  BP: 120/52 (23 Oct 2019 07:44) (115/51 - 133/53)  BP(mean): --  RR: 18 (23 Oct 2019 07:44) (17 - 18)  SpO2: 98% (23 Oct 2019 07:44) (98% - 100%)      I&O's Detail    22 Oct 2019 07:01  -  23 Oct 2019 07:00  --------------------------------------------------------  IN:  Total IN: 0 mL    OUT:    Voided: 1150 mL  Total OUT: 1150 mL    Total NET: -1150 mL          LABS:                        PHYSICAL EXAM:  Constitutional: well developed, well nourished, NAD  ENMT: normal facies, symmetric  Respiratory: Normal respiratory effort   Extremities:   Skin:  Psychiatric: oriented x 3; appropriate SURGERY DAILY PROGRESS NOTE:     SUBJECTIVE/24hr Events:     Patient seen and examined on am rounds. Yesterday tolerating regular diet. No acute events overnight. AVSS. States pain well controlled. Denies n/v, fever, chills. Passing gas. Last BM yesterday.      OBJECTIVE:    MEDICATIONS  (STANDING):  enoxaparin Injectable 40 milliGRAM(s) SubCutaneous daily  pantoprazole    Tablet 40 milliGRAM(s) Oral before breakfast    MEDICATIONS  (PRN):  acetaminophen   Tablet .. 650 milliGRAM(s) Oral every 6 hours PRN Mild Pain (1 - 3)  oxyCODONE    IR 10 milliGRAM(s) Oral every 4 hours PRN Severe Pain (7 - 10)  oxyCODONE    IR 5 milliGRAM(s) Oral every 4 hours PRN Moderate Pain (4 - 6)      Vital Signs Last 24 Hrs  T(C): 37 (23 Oct 2019 07:44), Max: 37.3 (23 Oct 2019 00:11)  T(F): 98.6 (23 Oct 2019 07:44), Max: 99.2 (23 Oct 2019 00:11)  HR: 64 (23 Oct 2019 07:44) (64 - 72)  BP: 120/52 (23 Oct 2019 07:44) (115/51 - 133/53)  BP(mean): --  RR: 18 (23 Oct 2019 07:44) (17 - 18)  SpO2: 98% (23 Oct 2019 07:44) (98% - 100%)      I&O's Detail    22 Oct 2019 07:01  -  23 Oct 2019 07:00  --------------------------------------------------------  IN:  Total IN: 0 mL    OUT:    Voided: 1150 mL  Total OUT: 1150 mL    Total NET: -1150 mL          PHYSICAL EXAM    General: Appears well, NAD  Abdomen: soft,  appropriately TTP, mildly distended, no rebound or guarding, staples c/d/i midline, no swelling, erythema or discharge.

## 2019-10-23 NOTE — PROGRESS NOTE ADULT - REASON FOR ADMISSION
abd pain

## 2019-10-23 NOTE — DISCHARGE NOTE NURSING/CASE MANAGEMENT/SOCIAL WORK - NSDCPNINST_GEN_ALL_CORE
Take medication as ordered, follow up with MD as advised, monitor incision for healing, call if fever over 101F, redness, swelling or drainage at sight, gradually  increase activity, eat a heart healthy diet

## 2019-10-23 NOTE — PROGRESS NOTE ADULT - SUBJECTIVE AND OBJECTIVE BOX
Vital Signs Last 24 Hrs  T(C): 37 (23 Oct 2019 07:44), Max: 37.3 (23 Oct 2019 00:11)  T(F): 98.6 (23 Oct 2019 07:44), Max: 99.2 (23 Oct 2019 00:11)  HR: 64 (23 Oct 2019 07:44) (64 - 72)  BP: 120/52 (23 Oct 2019 07:44) (115/51 - 133/53)  BP(mean): --  RR: 18 (23 Oct 2019 07:44) (17 - 18)  SpO2: 98% (23 Oct 2019 07:44) (98% - 100%)    I&O's Detail    22 Oct 2019 07:01  -  23 Oct 2019 07:00  --------------------------------------------------------  IN:  Total IN: 0 mL    OUT:    Voided: 1150 mL  Total OUT: 1150 mL    Total NET: -1150     tolerating diet  Incision clear    Plan:  discharge home today  RTO one week for staple removal  Instrucitons given                          PLAN:

## 2019-10-26 ENCOUNTER — INPATIENT (INPATIENT)
Facility: HOSPITAL | Age: 64
LOS: 3 days | Discharge: HOME | End: 2019-10-30
Attending: SPECIALIST | Admitting: SPECIALIST
Payer: MEDICARE

## 2019-10-26 VITALS
HEART RATE: 68 BPM | SYSTOLIC BLOOD PRESSURE: 150 MMHG | TEMPERATURE: 98 F | OXYGEN SATURATION: 100 % | RESPIRATION RATE: 16 BRPM | DIASTOLIC BLOOD PRESSURE: 72 MMHG

## 2019-10-26 DIAGNOSIS — Z90.49 ACQUIRED ABSENCE OF OTHER SPECIFIED PARTS OF DIGESTIVE TRACT: Chronic | ICD-10-CM

## 2019-10-26 DIAGNOSIS — Z98.890 OTHER SPECIFIED POSTPROCEDURAL STATES: Chronic | ICD-10-CM

## 2019-10-26 NOTE — ED ADULT TRIAGE NOTE - CHIEF COMPLAINT QUOTE
c/o lower abdominal pain and constipation. had sx for SBO last week. discharged on Wednesday. states has not had a BM since Thursday morning. appears uncomfortable.

## 2019-10-27 DIAGNOSIS — R10.9 UNSPECIFIED ABDOMINAL PAIN: ICD-10-CM

## 2019-10-27 DIAGNOSIS — T88.8XXA OTHER SPECIFIED COMPLICATIONS OF SURGICAL AND MEDICAL CARE, NOT ELSEWHERE CLASSIFIED, INITIAL ENCOUNTER: ICD-10-CM

## 2019-10-27 LAB
ALBUMIN SERPL ELPH-MCNC: 3.8 G/DL — SIGNIFICANT CHANGE UP (ref 3.3–5)
ALP SERPL-CCNC: 33 U/L — LOW (ref 40–120)
ALT FLD-CCNC: 15 U/L — SIGNIFICANT CHANGE UP (ref 4–33)
ANION GAP SERPL CALC-SCNC: 10 MMO/L — SIGNIFICANT CHANGE UP (ref 7–14)
ANION GAP SERPL CALC-SCNC: 14 MMO/L — SIGNIFICANT CHANGE UP (ref 7–14)
APTT BLD: 28.5 SEC — SIGNIFICANT CHANGE UP (ref 27.5–36.3)
AST SERPL-CCNC: 25 U/L — SIGNIFICANT CHANGE UP (ref 4–32)
BASE EXCESS BLDV CALC-SCNC: 5.4 MMOL/L — SIGNIFICANT CHANGE UP
BASOPHILS # BLD AUTO: 0.02 K/UL — SIGNIFICANT CHANGE UP (ref 0–0.2)
BASOPHILS # BLD AUTO: 0.03 K/UL — SIGNIFICANT CHANGE UP (ref 0–0.2)
BASOPHILS NFR BLD AUTO: 0.4 % — SIGNIFICANT CHANGE UP (ref 0–2)
BASOPHILS NFR BLD AUTO: 0.6 % — SIGNIFICANT CHANGE UP (ref 0–2)
BILIRUB SERPL-MCNC: 0.4 MG/DL — SIGNIFICANT CHANGE UP (ref 0.2–1.2)
BLD GP AB SCN SERPL QL: NEGATIVE — SIGNIFICANT CHANGE UP
BLOOD GAS VENOUS - CREATININE: 0.54 MG/DL — SIGNIFICANT CHANGE UP (ref 0.5–1.3)
BLOOD GAS VENOUS - FIO2: 21 — SIGNIFICANT CHANGE UP
BUN SERPL-MCNC: 10 MG/DL — SIGNIFICANT CHANGE UP (ref 7–23)
BUN SERPL-MCNC: 15 MG/DL — SIGNIFICANT CHANGE UP (ref 7–23)
CALCIUM SERPL-MCNC: 8 MG/DL — LOW (ref 8.4–10.5)
CALCIUM SERPL-MCNC: 9.7 MG/DL — SIGNIFICANT CHANGE UP (ref 8.4–10.5)
CHLORIDE BLDV-SCNC: SIGNIFICANT CHANGE UP MMOL/L (ref 96–108)
CHLORIDE SERPL-SCNC: 104 MMOL/L — SIGNIFICANT CHANGE UP (ref 98–107)
CHLORIDE SERPL-SCNC: 96 MMOL/L — LOW (ref 98–107)
CO2 SERPL-SCNC: 23 MMOL/L — SIGNIFICANT CHANGE UP (ref 22–31)
CO2 SERPL-SCNC: 26 MMOL/L — SIGNIFICANT CHANGE UP (ref 22–31)
CREAT SERPL-MCNC: 0.42 MG/DL — LOW (ref 0.5–1.3)
CREAT SERPL-MCNC: 0.55 MG/DL — SIGNIFICANT CHANGE UP (ref 0.5–1.3)
EOSINOPHIL # BLD AUTO: 0.13 K/UL — SIGNIFICANT CHANGE UP (ref 0–0.5)
EOSINOPHIL # BLD AUTO: 0.16 K/UL — SIGNIFICANT CHANGE UP (ref 0–0.5)
EOSINOPHIL NFR BLD AUTO: 2.4 % — SIGNIFICANT CHANGE UP (ref 0–6)
EOSINOPHIL NFR BLD AUTO: 3 % — SIGNIFICANT CHANGE UP (ref 0–6)
GAS PNL BLDV: SIGNIFICANT CHANGE UP MMOL/L (ref 136–146)
GLUCOSE BLDV-MCNC: 94 MG/DL — SIGNIFICANT CHANGE UP (ref 70–99)
GLUCOSE SERPL-MCNC: 91 MG/DL — SIGNIFICANT CHANGE UP (ref 70–99)
GLUCOSE SERPL-MCNC: 94 MG/DL — SIGNIFICANT CHANGE UP (ref 70–99)
HCO3 BLDV-SCNC: 28 MMOL/L — HIGH (ref 20–27)
HCT VFR BLD CALC: 29.4 % — LOW (ref 34.5–45)
HCT VFR BLD CALC: 33.4 % — LOW (ref 34.5–45)
HCT VFR BLDV CALC: 32.7 % — LOW (ref 34.5–45)
HGB BLD-MCNC: 10.3 G/DL — LOW (ref 11.5–15.5)
HGB BLD-MCNC: 9.4 G/DL — LOW (ref 11.5–15.5)
HGB BLDV-MCNC: 10.6 G/DL — LOW (ref 11.5–15.5)
IMM GRANULOCYTES NFR BLD AUTO: 0.2 % — SIGNIFICANT CHANGE UP (ref 0–1.5)
IMM GRANULOCYTES NFR BLD AUTO: 0.4 % — SIGNIFICANT CHANGE UP (ref 0–1.5)
INR BLD: 0.94 — SIGNIFICANT CHANGE UP (ref 0.88–1.17)
LACTATE BLDV-MCNC: 1.5 MMOL/L — SIGNIFICANT CHANGE UP (ref 0.5–2)
LYMPHOCYTES # BLD AUTO: 0.83 K/UL — LOW (ref 1–3.3)
LYMPHOCYTES # BLD AUTO: 1.03 K/UL — SIGNIFICANT CHANGE UP (ref 1–3.3)
LYMPHOCYTES # BLD AUTO: 15.6 % — SIGNIFICANT CHANGE UP (ref 13–44)
LYMPHOCYTES # BLD AUTO: 19.1 % — SIGNIFICANT CHANGE UP (ref 13–44)
MCHC RBC-ENTMCNC: 29.9 PG — SIGNIFICANT CHANGE UP (ref 27–34)
MCHC RBC-ENTMCNC: 30.8 % — LOW (ref 32–36)
MCHC RBC-ENTMCNC: 31.3 PG — SIGNIFICANT CHANGE UP (ref 27–34)
MCHC RBC-ENTMCNC: 32 % — SIGNIFICANT CHANGE UP (ref 32–36)
MCV RBC AUTO: 96.8 FL — SIGNIFICANT CHANGE UP (ref 80–100)
MCV RBC AUTO: 98 FL — SIGNIFICANT CHANGE UP (ref 80–100)
MONOCYTES # BLD AUTO: 0.42 K/UL — SIGNIFICANT CHANGE UP (ref 0–0.9)
MONOCYTES # BLD AUTO: 0.48 K/UL — SIGNIFICANT CHANGE UP (ref 0–0.9)
MONOCYTES NFR BLD AUTO: 7.9 % — SIGNIFICANT CHANGE UP (ref 2–14)
MONOCYTES NFR BLD AUTO: 8.9 % — SIGNIFICANT CHANGE UP (ref 2–14)
NEUTROPHILS # BLD AUTO: 3.68 K/UL — SIGNIFICANT CHANGE UP (ref 1.8–7.4)
NEUTROPHILS # BLD AUTO: 3.9 K/UL — SIGNIFICANT CHANGE UP (ref 1.8–7.4)
NEUTROPHILS NFR BLD AUTO: 68 % — SIGNIFICANT CHANGE UP (ref 43–77)
NEUTROPHILS NFR BLD AUTO: 73.5 % — SIGNIFICANT CHANGE UP (ref 43–77)
NRBC # FLD: 0 K/UL — SIGNIFICANT CHANGE UP (ref 0–0)
NRBC # FLD: 0 K/UL — SIGNIFICANT CHANGE UP (ref 0–0)
PCO2 BLDV: 51 MMHG — SIGNIFICANT CHANGE UP (ref 41–51)
PH BLDV: 7.39 PH — SIGNIFICANT CHANGE UP (ref 7.32–7.43)
PLATELET # BLD AUTO: 475 K/UL — HIGH (ref 150–400)
PLATELET # BLD AUTO: 552 K/UL — HIGH (ref 150–400)
PMV BLD: 8.5 FL — SIGNIFICANT CHANGE UP (ref 7–13)
PMV BLD: 9.2 FL — SIGNIFICANT CHANGE UP (ref 7–13)
PO2 BLDV: 29 MMHG — LOW (ref 35–40)
POTASSIUM BLDV-SCNC: SIGNIFICANT CHANGE UP MMOL/L (ref 3.4–4.5)
POTASSIUM SERPL-MCNC: 3.5 MMOL/L — SIGNIFICANT CHANGE UP (ref 3.5–5.3)
POTASSIUM SERPL-MCNC: 3.8 MMOL/L — SIGNIFICANT CHANGE UP (ref 3.5–5.3)
POTASSIUM SERPL-SCNC: 3.5 MMOL/L — SIGNIFICANT CHANGE UP (ref 3.5–5.3)
POTASSIUM SERPL-SCNC: 3.8 MMOL/L — SIGNIFICANT CHANGE UP (ref 3.5–5.3)
PROT SERPL-MCNC: 7.9 G/DL — SIGNIFICANT CHANGE UP (ref 6–8.3)
PROTHROM AB SERPL-ACNC: 10.7 SEC — SIGNIFICANT CHANGE UP (ref 9.8–13.1)
RBC # BLD: 3 M/UL — LOW (ref 3.8–5.2)
RBC # BLD: 3.45 M/UL — LOW (ref 3.8–5.2)
RBC # FLD: 12.7 % — SIGNIFICANT CHANGE UP (ref 10.3–14.5)
RBC # FLD: 12.8 % — SIGNIFICANT CHANGE UP (ref 10.3–14.5)
RH IG SCN BLD-IMP: POSITIVE — SIGNIFICANT CHANGE UP
SAO2 % BLDV: 49.5 % — LOW (ref 60–85)
SODIUM SERPL-SCNC: 136 MMOL/L — SIGNIFICANT CHANGE UP (ref 135–145)
SODIUM SERPL-SCNC: 137 MMOL/L — SIGNIFICANT CHANGE UP (ref 135–145)
WBC # BLD: 5.31 K/UL — SIGNIFICANT CHANGE UP (ref 3.8–10.5)
WBC # BLD: 5.4 K/UL — SIGNIFICANT CHANGE UP (ref 3.8–10.5)
WBC # FLD AUTO: 5.31 K/UL — SIGNIFICANT CHANGE UP (ref 3.8–10.5)
WBC # FLD AUTO: 5.4 K/UL — SIGNIFICANT CHANGE UP (ref 3.8–10.5)

## 2019-10-27 PROCEDURE — 74177 CT ABD & PELVIS W/CONTRAST: CPT | Mod: 26

## 2019-10-27 RX ORDER — ACETAMINOPHEN 500 MG
1000 TABLET ORAL ONCE
Refills: 0 | Status: DISCONTINUED | OUTPATIENT
Start: 2019-10-27 | End: 2019-10-27

## 2019-10-27 RX ORDER — ACETAMINOPHEN 500 MG
750 TABLET ORAL ONCE
Refills: 0 | Status: COMPLETED | OUTPATIENT
Start: 2019-10-27 | End: 2019-10-27

## 2019-10-27 RX ORDER — ONDANSETRON 8 MG/1
4 TABLET, FILM COATED ORAL EVERY 6 HOURS
Refills: 0 | Status: DISCONTINUED | OUTPATIENT
Start: 2019-10-27 | End: 2019-10-30

## 2019-10-27 RX ORDER — POTASSIUM CHLORIDE 20 MEQ
20 PACKET (EA) ORAL
Refills: 0 | Status: COMPLETED | OUTPATIENT
Start: 2019-10-27 | End: 2019-10-27

## 2019-10-27 RX ORDER — FENTANYL CITRATE 50 UG/ML
25 INJECTION INTRAVENOUS ONCE
Refills: 0 | Status: DISCONTINUED | OUTPATIENT
Start: 2019-10-27 | End: 2019-10-27

## 2019-10-27 RX ORDER — ACETAMINOPHEN 500 MG
650 TABLET ORAL ONCE
Refills: 0 | Status: COMPLETED | OUTPATIENT
Start: 2019-10-27 | End: 2019-10-27

## 2019-10-27 RX ORDER — ACETAMINOPHEN 500 MG
650 TABLET ORAL EVERY 6 HOURS
Refills: 0 | Status: DISCONTINUED | OUTPATIENT
Start: 2019-10-27 | End: 2019-10-29

## 2019-10-27 RX ORDER — MORPHINE SULFATE 50 MG/1
4 CAPSULE, EXTENDED RELEASE ORAL ONCE
Refills: 0 | Status: DISCONTINUED | OUTPATIENT
Start: 2019-10-27 | End: 2019-10-27

## 2019-10-27 RX ORDER — SODIUM CHLORIDE 9 MG/ML
1000 INJECTION INTRAMUSCULAR; INTRAVENOUS; SUBCUTANEOUS ONCE
Refills: 0 | Status: COMPLETED | OUTPATIENT
Start: 2019-10-27 | End: 2019-10-27

## 2019-10-27 RX ORDER — SODIUM CHLORIDE 9 MG/ML
1000 INJECTION, SOLUTION INTRAVENOUS
Refills: 0 | Status: DISCONTINUED | OUTPATIENT
Start: 2019-10-27 | End: 2019-10-27

## 2019-10-27 RX ORDER — ENOXAPARIN SODIUM 100 MG/ML
40 INJECTION SUBCUTANEOUS ONCE
Refills: 0 | Status: DISCONTINUED | OUTPATIENT
Start: 2019-10-27 | End: 2019-10-27

## 2019-10-27 RX ORDER — ENOXAPARIN SODIUM 100 MG/ML
40 INJECTION SUBCUTANEOUS DAILY
Refills: 0 | Status: DISCONTINUED | OUTPATIENT
Start: 2019-10-27 | End: 2019-10-27

## 2019-10-27 RX ORDER — ACETAMINOPHEN 500 MG
975 TABLET ORAL ONCE
Refills: 0 | Status: COMPLETED | OUTPATIENT
Start: 2019-10-27 | End: 2019-10-27

## 2019-10-27 RX ORDER — ACETAMINOPHEN 500 MG
1000 TABLET ORAL ONCE
Refills: 0 | Status: COMPLETED | OUTPATIENT
Start: 2019-10-27 | End: 2019-10-27

## 2019-10-27 RX ORDER — ENOXAPARIN SODIUM 100 MG/ML
30 INJECTION SUBCUTANEOUS ONCE
Refills: 0 | Status: COMPLETED | OUTPATIENT
Start: 2019-10-27 | End: 2019-10-27

## 2019-10-27 RX ORDER — DEXTROSE MONOHYDRATE, SODIUM CHLORIDE, AND POTASSIUM CHLORIDE 50; .745; 4.5 G/1000ML; G/1000ML; G/1000ML
1000 INJECTION, SOLUTION INTRAVENOUS
Refills: 0 | Status: DISCONTINUED | OUTPATIENT
Start: 2019-10-27 | End: 2019-10-28

## 2019-10-27 RX ORDER — HYDROMORPHONE HYDROCHLORIDE 2 MG/ML
0.5 INJECTION INTRAMUSCULAR; INTRAVENOUS; SUBCUTANEOUS EVERY 4 HOURS
Refills: 0 | Status: DISCONTINUED | OUTPATIENT
Start: 2019-10-27 | End: 2019-10-28

## 2019-10-27 RX ADMIN — Medication 975 MILLIGRAM(S): at 01:28

## 2019-10-27 RX ADMIN — Medication 750 MILLIGRAM(S): at 23:00

## 2019-10-27 RX ADMIN — Medication 300 MILLIGRAM(S): at 22:31

## 2019-10-27 RX ADMIN — FENTANYL CITRATE 25 MICROGRAM(S): 50 INJECTION INTRAVENOUS at 04:10

## 2019-10-27 RX ADMIN — Medication 260 MILLIGRAM(S): at 15:00

## 2019-10-27 RX ADMIN — Medication 20 MILLIEQUIVALENT(S): at 17:13

## 2019-10-27 RX ADMIN — Medication 1000 MILLIGRAM(S): at 09:40

## 2019-10-27 RX ADMIN — HYDROMORPHONE HYDROCHLORIDE 0.5 MILLIGRAM(S): 2 INJECTION INTRAMUSCULAR; INTRAVENOUS; SUBCUTANEOUS at 18:53

## 2019-10-27 RX ADMIN — DEXTROSE MONOHYDRATE, SODIUM CHLORIDE, AND POTASSIUM CHLORIDE 75 MILLILITER(S): 50; .745; 4.5 INJECTION, SOLUTION INTRAVENOUS at 17:12

## 2019-10-27 RX ADMIN — HYDROMORPHONE HYDROCHLORIDE 0.5 MILLIGRAM(S): 2 INJECTION INTRAMUSCULAR; INTRAVENOUS; SUBCUTANEOUS at 18:27

## 2019-10-27 RX ADMIN — SODIUM CHLORIDE 100 MILLILITER(S): 9 INJECTION, SOLUTION INTRAVENOUS at 08:22

## 2019-10-27 RX ADMIN — Medication 650 MILLIGRAM(S): at 17:13

## 2019-10-27 RX ADMIN — Medication 400 MILLIGRAM(S): at 08:22

## 2019-10-27 RX ADMIN — ENOXAPARIN SODIUM 30 MILLIGRAM(S): 100 INJECTION SUBCUTANEOUS at 11:26

## 2019-10-27 RX ADMIN — SODIUM CHLORIDE 1000 MILLILITER(S): 9 INJECTION INTRAMUSCULAR; INTRAVENOUS; SUBCUTANEOUS at 00:50

## 2019-10-27 NOTE — H&P ADULT - ASSESSMENT
64F with pre-sacral fluid collection, unclear if seroma, hematoma, or abscess/leak. Benign process likely as patient is hemodynamically stable without signs of infection, labs normal.

## 2019-10-27 NOTE — ED ADULT NURSE NOTE - OBJECTIVE STATEMENT
pt received in rm 8 AAO x 3. pt reports b/l lower abdominal pain. pt states she was d/c on wednesday for sbo, had surgery for sbo 10/16/17. pt also reports LBM on thursday but is passing gas. pt also states she is tolerating PO intake. respirations even and unlabored. abdomen is soft, non distended and tender to lower abdomen. staples noted to lower mid abdomen. site clean dry and intact. 20g iv placed to right ac.

## 2019-10-27 NOTE — ED PROVIDER NOTE - ATTENDING CONTRIBUTION TO CARE
GEN: no acute respiratory distress. nontoxic, speaking comfortably in full sentences, in moderate pain  HEENT: NCAT. face symmetrical. PERRL 4mm, EOMI, MMM, oropharynx wnl  Neck: no JVD, trachea midline, no LAD  CV: RRR. +S1S2, no murmur. cap refill <2 sec  Chest: CTA B/l. no wheezing, rales, rhonchi. no retractions. good air movement.   ABD: +BS, soft, minimally distended, tender L>R. No guarding/rebound. surgical staples intact  : no cva or suprapubic tenderness  MSK: No clubbing, cyanosis, edema. FROM of all extremities.   Neuro: AOOX3.  no focal deficit  SKIN: No  rash    63 yo F pmh colon ca s/p resection, ostomy reversal, hx of sbo x2 conservatively managed, recent clsoed sbo s/p OR management dc on 10/23 with worsening abd pain, decreased po, no BM x 2 days. +flatus. last po 6am. no fever chills, n/v, cp, sob, urinary complaints. concern for sbo, post-op related complication. plan: symptom relief, ivf, labs, ct

## 2019-10-27 NOTE — H&P ADULT - HISTORY OF PRESENT ILLNESS
64F with history of LAR for colon CA 2017, recently with closed-loop SBO tx with ex-lap, SBR on 10/16/19, discharged 10/23, presents with 3 days of abdominal pain. Patient states that her pain is moderate, not relieved with Tylenol nor Oxycodone (lasts only 1 hour). Has urge to defecate but cannot pass stool. Still passing flatus. No HA, CP, SOB, No N/V, No F/C.

## 2019-10-27 NOTE — ED PROVIDER NOTE - CLINICAL SUMMARY MEDICAL DECISION MAKING FREE TEXT BOX
Demetrius Kirby MD: 63 yo F pmh colon ca s/p resection, ostomy reversal, hx of sbo x2 conservatively managed, recent sbo s/p OR management dc on 10/23 with worsening abd pain, decreased po, no BM x 2 days. r/o SBO labs, ct abd pelvis pain control ivf

## 2019-10-27 NOTE — ED PROVIDER NOTE - NS ED ROS FT
GENERAL: No fever or chills, EYES: no change in vision, HEENT: no trouble speaking, CARDIAC: no chest pain, palpitation PULMONARY: no cough or SOB, GI: + abdominal pain, no nausea, no vomiting, no diarrhea or + constipation, : No changes in urination, SKIN: no rashes, NEURO: no headache,  MSK: No muscle pain ~Demetrius Kirby MD

## 2019-10-27 NOTE — ED ADULT NURSE NOTE - NSIMPLEMENTINTERV_GEN_ALL_ED
Implemented All Universal Safety Interventions:  [place] to call system. Call bell, personal items and telephone within reach. Instruct patient to call for assistance. Room bathroom lighting operational. Non-slip footwear when patient is off stretcher. Physically safe environment: no spills, clutter or unnecessary equipment. Stretcher in lowest position, wheels locked, appropriate side rails in place.

## 2019-10-27 NOTE — H&P ADULT - NSHPLABSRESULTS_GEN_ALL_CORE
Labs and imaging reviewed    BOWEL: Interval partial small bowel resection with anastomosis in the   central left pelvis. Previous low anterior resection and anastomosis.   Revised ileoileal anastomosis in the right midabdomen. No bowel   obstruction. Appendix is normal.  PERITONEUM: A 5.8 x 4.9 x 4.9 cm rim-enhancing fluid collection in the   presacral space and mild nonspecific mesenteric fat stranding. No   pneumoperitoneum.

## 2019-10-27 NOTE — H&P ADULT - NSHPPHYSICALEXAM_GEN_ALL_CORE
AAO in NAD  No respiratory distress  Abd soft, mildly distended, minimal TTP. Midline incision C/D/I with staples. No erythema, no purulent discharge  Calves soft  Rectal exam deferred.

## 2019-10-27 NOTE — PROGRESS NOTE ADULT - SUBJECTIVE AND OBJECTIVE BOX
SURGERY DAILY PROGRESS NOTE:     SUBJECTIVE/24hr Events:     Patient seen and examined on am rounds. Tolerating regular diet. No acute events overnight.   Moderate abdominal pain. Denies n/v, fever, chills.      Objective:  Vital Signs Last 24 Hrs  T(C): 36.8 (27 Oct 2019 20:12), Max: 36.8 (27 Oct 2019 12:15)  T(F): 98.2 (27 Oct 2019 20:12), Max: 98.3 (27 Oct 2019 15:00)  HR: 982 (27 Oct 2019 20:12) (61 - 982)  BP: 127/78 (27 Oct 2019 20:12) (127/62 - 150/72)  BP(mean): --  RR: 19 (27 Oct 2019 20:12) (16 - 20)  SpO2: 99% (27 Oct 2019 20:12) (98% - 100%)    I&O's Detail    27 Oct 2019 07:01  -  27 Oct 2019 21:27  --------------------------------------------------------  IN:    dextrose 5% + sodium chloride 0.45% with potassium chloride 20 mEq/L: 300 mL    lactated ringers.: 800 mL  Total IN: 1100 mL    OUT:    Voided: 800 mL  Total OUT: 800 mL    Total NET: 300 mL          MEDICATIONS  (STANDING):  acetaminophen  IVPB .. 1000 milliGRAM(s) IV Intermittent once  dextrose 5% + sodium chloride 0.45% with potassium chloride 20 mEq/L 1000 milliLiter(s) (75 mL/Hr) IV Continuous <Continuous>    MEDICATIONS  (PRN):  acetaminophen   Tablet .. 650 milliGRAM(s) Oral every 6 hours PRN Mild Pain (1 - 3), Moderate Pain (4 - 6)  HYDROmorphone  Injectable 0.5 milliGRAM(s) IV Push every 4 hours PRN Severe Pain (7 - 10)  ondansetron Injectable 4 milliGRAM(s) IV Push every 6 hours PRN Nausea                            9.4    5.31  )-----------( 475      ( 27 Oct 2019 06:45 )             29.4       10-27    137  |  104  |  10  ----------------------------<  91  3.5   |  23  |  0.42<L>    Ca    8.0<L>      27 Oct 2019 06:45    TPro  7.9  /  Alb  3.8  /  TBili  0.4  /  DBili  x   /  AST  25  /  ALT  15  /  AlkPhos  33<L>  10-27        PHYSICAL EXAM    General: Appears well, NAD  Abdomen: soft,  appropriately TTP, mildly distended, no rebound or guarding, staples c/d/i midline, no swelling, erythema or discharge.

## 2019-10-27 NOTE — ED PROVIDER NOTE - PHYSICAL EXAMINATION
Gen: AAOx3, non-toxic  Head: NCAT  HEENT: EOMI, PERRLA, oral mucosa moist, normal conjunctiva  Lung: CTAB, no respiratory distress, no wheezes/rhonchi/rales B/L, speaking in full sentences  CV: RRR, no murmurs, rubs or gallops  Abd: soft, diffuse TTP ND, no guarding, no CVA tenderness, no rebound tenderness  MSK: no visible deformities, full range of motion of all 4 exts  Neuro: No focal sensory or motor deficits  Skin: Warm, well perfused, no rash  Psych: normal affect.   ~Demetrius Kirby MD

## 2019-10-27 NOTE — H&P ADULT - NSICDXPASTSURGICALHX_GEN_ALL_CORE_FT
PAST SURGICAL HISTORY:  H/O ileostomy reversal 12/2017    History of low anterior resection of rectum Surgeon Dr Moore performed 8/4/17

## 2019-10-27 NOTE — ED PROVIDER NOTE - OBJECTIVE STATEMENT
Demetrius Kirby MD: 63 yo F pmh colon ca s/p resection, ostomy reversal, hx of sbo x2 conservatively managed, recent sbo s/p OR management dc on 10/23 with worsening abd pain, decreased po, no BM x 2 days. Denies fever, n/v/ diarrhea, blood stool, obstipation

## 2019-10-27 NOTE — H&P ADULT - PROBLEM SELECTOR PLAN 1
Admit to surgery, Dr. Moore  NPO  IVF  ABX - Zosyn  Will consult IR for transrectal drainage of fluid collection  Pain control  Discussed with surg-onc attending on call.

## 2019-10-27 NOTE — ED PROVIDER NOTE - BIRTH SEX
Subjective:       Patient ID:  Mine Wilkins is a 81 y.o. female who presents for   Chief Complaint   Patient presents with    Spot     face    Skin Check     UBSe     History of Present Illness: The patient presents with chief complaint of spots.  Location: legs  Duration: months  Signs/Symptoms: none    Prior treatments: none          Review of Systems   Constitutional: Negative for fever.   Skin: Negative for itching and rash.   Hematologic/Lymphatic: Does not bruise/bleed easily.        Objective:    Physical Exam   Constitutional: She appears well-developed and well-nourished. No distress.   Neurological: She is alert and oriented to person, place, and time. She is not disoriented.   Psychiatric: She has a normal mood and affect.   Skin:   Areas Examined (abnormalities noted in diagram):   Head / Face Inspection Performed  Neck Inspection Performed  Chest / Axilla Inspection Performed  Back Inspection Performed  RUE Inspected  LUE Inspection Performed  RLE Inspected  LLE Inspection Performed                   Diagram Legend     Erythematous scaling macule/papule c/w actinic keratosis       Vascular papule c/w angioma      Pigmented verrucoid papule/plaque c/w seborrheic keratosis      Yellow umbilicated papule c/w sebaceous hyperplasia      Irregularly shaped tan macule c/w lentigo     1-2 mm smooth white papules consistent with Milia      Movable subcutaneous cyst with punctum c/w epidermal inclusion cyst      Subcutaneous movable cyst c/w pilar cyst      Firm pink to brown papule c/w dermatofibroma      Pedunculated fleshy papule(s) c/w skin tag(s)      Evenly pigmented macule c/w junctional nevus     Mildly variegated pigmented, slightly irregular-bordered macule c/w mildly atypical nevus      Flesh colored to evenly pigmented papule c/w intradermal nevus       Pink pearly papule/plaque c/w basal cell carcinoma      Erythematous hyperkeratotic cursted plaque c/w SCC      Surgical scar with no  "sign of skin cancer recurrence      Open and closed comedones      Inflammatory papules and pustules      Verrucoid papule consistent consistent with wart     Erythematous eczematous patches and plaques     Dystrophic onycholytic nail with subungual debris c/w onychomycosis     Umbilicated papule    Erythematous-base heme-crusted tan verrucoid plaque consistent with inflamed seborrheic keratosis     Erythematous Silvery Scaling Plaque c/w Psoriasis     See annotation      Assessment / Plan:        Seborrheic keratoses  reassurance        Milium  differin gel hs  Nevus of multiple sites  The "ABCD" rules to observe pigmented lesions were reviewed.               Follow-up in about 1 year (around 4/25/2019).  " Female

## 2019-10-28 ENCOUNTER — TRANSCRIPTION ENCOUNTER (OUTPATIENT)
Age: 64
End: 2019-10-28

## 2019-10-28 LAB
ANION GAP SERPL CALC-SCNC: 9 MMO/L — SIGNIFICANT CHANGE UP (ref 7–14)
BLD GP AB SCN SERPL QL: NEGATIVE — SIGNIFICANT CHANGE UP
BUN SERPL-MCNC: 5 MG/DL — LOW (ref 7–23)
CALCIUM SERPL-MCNC: 9 MG/DL — SIGNIFICANT CHANGE UP (ref 8.4–10.5)
CHLORIDE SERPL-SCNC: 100 MMOL/L — SIGNIFICANT CHANGE UP (ref 98–107)
CO2 SERPL-SCNC: 29 MMOL/L — SIGNIFICANT CHANGE UP (ref 22–31)
CREAT SERPL-MCNC: 0.5 MG/DL — SIGNIFICANT CHANGE UP (ref 0.5–1.3)
GLUCOSE SERPL-MCNC: 116 MG/DL — HIGH (ref 70–99)
GRAM STN WND: SIGNIFICANT CHANGE UP
HCT VFR BLD CALC: 28.5 % — LOW (ref 34.5–45)
HGB BLD-MCNC: 9.2 G/DL — LOW (ref 11.5–15.5)
MAGNESIUM SERPL-MCNC: 2 MG/DL — SIGNIFICANT CHANGE UP (ref 1.6–2.6)
MCHC RBC-ENTMCNC: 30.6 PG — SIGNIFICANT CHANGE UP (ref 27–34)
MCHC RBC-ENTMCNC: 32.3 % — SIGNIFICANT CHANGE UP (ref 32–36)
MCV RBC AUTO: 94.7 FL — SIGNIFICANT CHANGE UP (ref 80–100)
NRBC # FLD: 0 K/UL — SIGNIFICANT CHANGE UP (ref 0–0)
PHOSPHATE SERPL-MCNC: 2.9 MG/DL — SIGNIFICANT CHANGE UP (ref 2.5–4.5)
PLATELET # BLD AUTO: 574 K/UL — HIGH (ref 150–400)
PMV BLD: 7.9 FL — SIGNIFICANT CHANGE UP (ref 7–13)
POTASSIUM SERPL-MCNC: 4.3 MMOL/L — SIGNIFICANT CHANGE UP (ref 3.5–5.3)
POTASSIUM SERPL-SCNC: 4.3 MMOL/L — SIGNIFICANT CHANGE UP (ref 3.5–5.3)
RBC # BLD: 3.01 M/UL — LOW (ref 3.8–5.2)
RBC # FLD: 12.5 % — SIGNIFICANT CHANGE UP (ref 10.3–14.5)
RH IG SCN BLD-IMP: POSITIVE — SIGNIFICANT CHANGE UP
SODIUM SERPL-SCNC: 138 MMOL/L — SIGNIFICANT CHANGE UP (ref 135–145)
SPECIMEN SOURCE: SIGNIFICANT CHANGE UP
WBC # BLD: 5.03 K/UL — SIGNIFICANT CHANGE UP (ref 3.8–10.5)
WBC # FLD AUTO: 5.03 K/UL — SIGNIFICANT CHANGE UP (ref 3.8–10.5)

## 2019-10-28 PROCEDURE — 99232 SBSQ HOSP IP/OBS MODERATE 35: CPT | Mod: 24

## 2019-10-28 PROCEDURE — 49406 IMAGE CATH FLUID PERI/RETRO: CPT

## 2019-10-28 RX ORDER — OXYCODONE HYDROCHLORIDE 5 MG/1
5 TABLET ORAL EVERY 6 HOURS
Refills: 0 | Status: DISCONTINUED | OUTPATIENT
Start: 2019-10-28 | End: 2019-10-30

## 2019-10-28 RX ORDER — SODIUM CHLORIDE 9 MG/ML
3 INJECTION INTRAMUSCULAR; INTRAVENOUS; SUBCUTANEOUS EVERY 8 HOURS
Refills: 0 | Status: DISCONTINUED | OUTPATIENT
Start: 2019-10-28 | End: 2019-10-30

## 2019-10-28 RX ORDER — ENOXAPARIN SODIUM 100 MG/ML
40 INJECTION SUBCUTANEOUS DAILY
Refills: 0 | Status: DISCONTINUED | OUTPATIENT
Start: 2019-10-28 | End: 2019-10-30

## 2019-10-28 RX ADMIN — Medication 650 MILLIGRAM(S): at 21:40

## 2019-10-28 RX ADMIN — HYDROMORPHONE HYDROCHLORIDE 0.5 MILLIGRAM(S): 2 INJECTION INTRAMUSCULAR; INTRAVENOUS; SUBCUTANEOUS at 17:21

## 2019-10-28 RX ADMIN — HYDROMORPHONE HYDROCHLORIDE 0.5 MILLIGRAM(S): 2 INJECTION INTRAMUSCULAR; INTRAVENOUS; SUBCUTANEOUS at 04:52

## 2019-10-28 RX ADMIN — HYDROMORPHONE HYDROCHLORIDE 0.5 MILLIGRAM(S): 2 INJECTION INTRAMUSCULAR; INTRAVENOUS; SUBCUTANEOUS at 16:58

## 2019-10-28 RX ADMIN — SODIUM CHLORIDE 3 MILLILITER(S): 9 INJECTION INTRAMUSCULAR; INTRAVENOUS; SUBCUTANEOUS at 21:01

## 2019-10-28 RX ADMIN — Medication 650 MILLIGRAM(S): at 21:10

## 2019-10-28 RX ADMIN — HYDROMORPHONE HYDROCHLORIDE 0.5 MILLIGRAM(S): 2 INJECTION INTRAMUSCULAR; INTRAVENOUS; SUBCUTANEOUS at 04:22

## 2019-10-28 RX ADMIN — DEXTROSE MONOHYDRATE, SODIUM CHLORIDE, AND POTASSIUM CHLORIDE 75 MILLILITER(S): 50; .745; 4.5 INJECTION, SOLUTION INTRAVENOUS at 17:00

## 2019-10-28 NOTE — DISCHARGE NOTE PROVIDER - NSDCFUADDINST_GEN_ALL_CORE_FT
Please follow up with Interventional radiology to have your drain evaluated one week from discharge.  Please call today, to schedule an appointment (for one week from discharge date) [phone].   Location is in [facility] on the second floor radiology Room 263. You can park at [facility] [employer] [facility].     You will be discharged with a SARA drain. You will need to empty them and record outputs accurately. This will be taught to you by the nursing staff. Please do not remove the SARA drain. It will be removed in the office. Please bring to the office accurate records of output.

## 2019-10-28 NOTE — DISCHARGE NOTE PROVIDER - HOSPITAL COURSE
64 F hx of colon CA, s/p LAR and ileostomy reversal and recurrent SBO, s/p SBR with primary anastomosis on 10/16 presents to [facility] on 10/27  w/ 3 days of generalized abdominal pain and found on CT to have a pre-sacral abdominal collection. Patient admitted to surgery, she was made NPO. She went to IR on 10/28 for drainage. Patient tolerated the procedure well. Patients diet was slowly advanced as tolerated.  At this time, pt is tolerating a regular diet, ambulating and voiding.  Pt has been deemed stable for discharge at this time. 64 F hx of colon CA, s/p LAR and ileostomy reversal and recurrent SBO, s/p SBR with primary anastomosis on 10/16 presents to [facility] on 10/27  w/ 3 days of generalized abdominal pain and found on CT to have a pre-sacral abdominal collection. Patient admitted to surgery, she was made NPO. She went to IR on 10/28 for drainage. Patient tolerated the procedure well.         The patient's pain was controlled by IV pain medications and then by PO pain medications. The patient was advanced to a regular diet and tolerated it well. The patient was placed on home medications. At the time of discharge, the patient was hemodynamically stable, was tolerating PO diet, was voiding urine and passing stool, was ambulating, and was comfortable with adequate pain control. The patient was instructed to follow up with Dr. Tamayo within 7-10 days after discharge from the hospital. The patient & family felt comfortable with discharge. The patient had no other issues.

## 2019-10-28 NOTE — DISCHARGE NOTE PROVIDER - NSDCCPCAREPLAN_GEN_ALL_CORE_FT
PRINCIPAL DISCHARGE DIAGNOSIS  Diagnosis: Abdominal pain  Assessment and Plan of Treatment: found to have fluid collection s/p IR drainage PRINCIPAL DISCHARGE DIAGNOSIS  Diagnosis: Abdominal pain  Assessment and Plan of Treatment: You were found to have fluid collection s/p IR drainage

## 2019-10-28 NOTE — DISCHARGE NOTE PROVIDER - CARE PROVIDER_API CALL
Kory Moore)  Surgery  [address] [place] [address]  Phone: [phone]  Fax: [phone]  Follow Up Time: 1 week Kory Moore)  Surgery  [address] [place] [address]  Phone: [phone]  Fax: [phone]  Follow Up Time: 1 week

## 2019-10-28 NOTE — CHART NOTE - NSCHARTNOTEFT_GEN_A_CORE
Patient Age: 64y    Patient Gender: Female    Procedure (including site / side if known): Drainage of presacral fluid collection    Diagnosis / Indication: Presacral fluid collection    Interventional Radiology Attending Physician: Dr. Mendez    Ordering Attending Physician: Dr. Moore    Pertinent Medical History:  64 F hx of colon CA, s/p LAR and ileostomy reversal and recurrent SBO, presented with closed loop bowel obstruction now s/p SBR with primary anastomosis on 10/16. Now represents w/ 3 days of generalized abdominal pain and found on CT to have a pre-sacral abdominal collection.    PAST MEDICAL & SURGICAL HISTORY:  SBO (small bowel obstruction): multiple- last 12/2018 - resolved  HTN (hypertension): No meds  Colorectal cancer  Thyroid disorder: h/o Thyroid disorder- no issues / meds  H/O ileostomy: reversal 12/2017  History of low anterior resection of rectum: Surgeon Dr Moore performed 8/4/17        Pertinent Labs:                        9.2    5.03  )-----------( 574      ( 28 Oct 2019 06:14 )             28.5       10-28    138  |  100  |  5<L>  ----------------------------<  116<H>  4.3   |  29  |  0.50    Ca    9.0      28 Oct 2019 06:14  Phos  2.9     10-28  Mg     2.0     10-28    TPro  7.9  /  Alb  3.8  /  TBili  0.4  /  DBili  x   /  AST  25  /  ALT  15  /  AlkPhos  33<L>  10-27      PT/INR - ( 27 Oct 2019 00:25 )   PT: 10.7 SEC;   INR: 0.94          PTT - ( 27 Oct 2019 00:25 )  PTT:28.5 SEC    Patient and Family aware:   [ x ]Y   [  ]N

## 2019-10-28 NOTE — DISCHARGE NOTE PROVIDER - NSDCFUSCHEDAPPT_GEN_ALL_CORE_FT
JOCE EMMANUEL ; 10/30/2019 ; [facility]  JOCE EMMANUEL ; 01/22/2020 ; FAITH Mejia JOCE EMMANUEL ; 10/30/2019 ; [facility]  JOCE EMMANUEL ; 01/22/2020 ; FAITH Mejia JOCE EMMANUEL ; 10/30/2019 ; [facility]  JOCE EMMANUEL ; 01/22/2020 ; FAITH Mejia

## 2019-10-28 NOTE — PROGRESS NOTE ADULT - SUBJECTIVE AND OBJECTIVE BOX
Vital Signs Last 24 Hrs  T(C): 36.6 (28 Oct 2019 05:40), Max: 36.8 (27 Oct 2019 12:15)  T(F): 97.8 (28 Oct 2019 05:40), Max: 98.3 (27 Oct 2019 15:00)  HR: 59 (28 Oct 2019 05:40) (55 - 70)  BP: 119/55 (28 Oct 2019 05:40) (119/55 - 135/62)  BP(mean): --  RR: 19 (28 Oct 2019 05:40) (18 - 20)  SpO2: 99% (28 Oct 2019 05:40) (98% - 100%)    I&O's Detail    27 Oct 2019 07:01  -  28 Oct 2019 07:00  --------------------------------------------------------  IN:    dextrose 5% + sodium chloride 0.45% with potassium chloride 20 mEq/L: 300 mL    lactated ringers.: 800 mL  Total IN: 1100 mL    OUT:    Voided: 1150 mL  Total OUT: 1150 mL    Total NET: -50 mL                                9.2    5.03  )-----------( 574      ( 28 Oct 2019 06:14 )             28.5       10-28    138  |  100  |  5<L>  ----------------------------<  116<H>  4.3   |  29  |  0.50    Ca    9.0      28 Oct 2019 06:14  Phos  2.9     10-28  Mg     2.0     10-28    TPro  7.9  /  Alb  3.8  /  TBili  0.4  /  DBili  x   /  AST  25  /  ALT  15  /  AlkPhos  33<L>  10-27      PT/INR - ( 27 Oct 2019 00:25 )   PT: 10.7 SEC;   INR: 0.94          PTT - ( 27 Oct 2019 00:25 )  PTT:28.5 SEC    Abdomen non-tender  incision clear - staples removed    PLAN:  IR drainage of pelvic collection today  No antibiotics unless pelvic collection grows bacteria

## 2019-10-28 NOTE — PROGRESS NOTE ADULT - SUBJECTIVE AND OBJECTIVE BOX
SURGERY DAILY PROGRESS NOTE:     SUBJECTIVE/24hr Events:     Patient seen and examined on am rounds.      OBJECTIVE:    MEDICATIONS  (STANDING):  dextrose 5% + sodium chloride 0.45% with potassium chloride 20 mEq/L 1000 milliLiter(s) (75 mL/Hr) IV Continuous <Continuous>    MEDICATIONS  (PRN):  acetaminophen   Tablet .. 650 milliGRAM(s) Oral every 6 hours PRN Mild Pain (1 - 3), Moderate Pain (4 - 6)  HYDROmorphone  Injectable 0.5 milliGRAM(s) IV Push every 4 hours PRN Severe Pain (7 - 10)  ondansetron Injectable 4 milliGRAM(s) IV Push every 6 hours PRN Nausea      Vital Signs Last 24 Hrs  T(C): 36.8 (28 Oct 2019 08:30), Max: 36.8 (27 Oct 2019 12:15)  T(F): 98.2 (28 Oct 2019 08:30), Max: 98.3 (27 Oct 2019 15:00)  HR: 64 (28 Oct 2019 08:30) (55 - 70)  BP: 122/62 (28 Oct 2019 08:30) (119/55 - 135/62)  BP(mean): --  RR: 20 (28 Oct 2019 08:30) (18 - 20)  SpO2: 98% (28 Oct 2019 08:30) (98% - 100%)      I&O's Detail    27 Oct 2019 07:01  -  28 Oct 2019 07:00  --------------------------------------------------------  IN:    dextrose 5% + sodium chloride 0.45% with potassium chloride 20 mEq/L: 300 mL    lactated ringers.: 800 mL  Total IN: 1100 mL    OUT:    Voided: 1150 mL  Total OUT: 1150 mL    Total NET: -50 mL        LABS:                        9.2    5.03  )-----------( 574      ( 28 Oct 2019 06:14 )             28.5     10-28    138  |  100  |  5<L>  ----------------------------<  116<H>  4.3   |  29  |  0.50    Ca    9.0      28 Oct 2019 06:14  Phos  2.9     10-28  Mg     2.0     10-28    TPro  7.9  /  Alb  3.8  /  TBili  0.4  /  DBili  x   /  AST  25  /  ALT  15  /  AlkPhos  33<L>  10-27    PT/INR - ( 27 Oct 2019 00:25 )   PT: 10.7 SEC;   INR: 0.94          PTT - ( 27 Oct 2019 00:25 )  PTT:28.5 SEC      PHYSICAL EXAM:  Constitutional: well developed, well nourished, NAD  ENMT: normal facies, symmetric  Respiratory: Normal respiratory effort   Extremities:   Skin:  Psychiatric: oriented x 3; appropriate SURGERY DAILY PROGRESS NOTE:     SUBJECTIVE/24hr Events:     Patient seen and examined on am rounds. She is doing well o/n but has continuing complaints of abdominal pain.      Objective:  Vital Signs Last 24 Hrs  T(C): 36.8 (28 Oct 2019 08:30), Max: 36.8 (27 Oct 2019 12:15)  T(F): 98.2 (28 Oct 2019 08:30), Max: 98.3 (27 Oct 2019 15:00)  HR: 64 (28 Oct 2019 08:30) (55 - 70)  BP: 122/62 (28 Oct 2019 08:30) (119/55 - 135/62)  BP(mean): --  RR: 20 (28 Oct 2019 08:30) (18 - 20)  SpO2: 98% (28 Oct 2019 08:30) (98% - 100%)    I&O's Detail    27 Oct 2019 07:01  -  28 Oct 2019 07:00  --------------------------------------------------------  IN:    dextrose 5% + sodium chloride 0.45% with potassium chloride 20 mEq/L: 300 mL    lactated ringers.: 800 mL  Total IN: 1100 mL    OUT:    Voided: 1150 mL  Total OUT: 1150 mL    Total NET: -50 mL        MEDICATIONS  (STANDING):  dextrose 5% + sodium chloride 0.45% with potassium chloride 20 mEq/L 1000 milliLiter(s) (75 mL/Hr) IV Continuous <Continuous>    MEDICATIONS  (PRN):  acetaminophen   Tablet .. 650 milliGRAM(s) Oral every 6 hours PRN Mild Pain (1 - 3), Moderate Pain (4 - 6)  HYDROmorphone  Injectable 0.5 milliGRAM(s) IV Push every 4 hours PRN Severe Pain (7 - 10)  ondansetron Injectable 4 milliGRAM(s) IV Push every 6 hours PRN Nausea                            9.2    5.03  )-----------( 574      ( 28 Oct 2019 06:14 )             28.5       10-28    138  |  100  |  5<L>  ----------------------------<  116<H>  4.3   |  29  |  0.50    Ca    9.0      28 Oct 2019 06:14  Phos  2.9     10-28  Mg     2.0     10-28    TPro  7.9  /  Alb  3.8  /  TBili  0.4  /  DBili  x   /  AST  25  /  ALT  15  /  AlkPhos  33<L>  10-27    SURGERY DAILY PROGRESS NOTE:     SUBJECTIVE/24hr Events:     Patient seen and examined on am rounds.      OBJECTIVE:    MEDICATIONS  (STANDING):  dextrose 5% + sodium chloride 0.45% with potassium chloride 20 mEq/L 1000 milliLiter(s) (75 mL/Hr) IV Continuous <Continuous>    MEDICATIONS  (PRN):  acetaminophen   Tablet .. 650 milliGRAM(s) Oral every 6 hours PRN Mild Pain (1 - 3), Moderate Pain (4 - 6)  HYDROmorphone  Injectable 0.5 milliGRAM(s) IV Push every 4 hours PRN Severe Pain (7 - 10)  ondansetron Injectable 4 milliGRAM(s) IV Push every 6 hours PRN Nausea      PHYSICAL EXAM:  Constitutional: well developed, well nourished, NAD  ENMT: normal facies, symmetric  Respiratory: Normal respiratory effort   Psychiatric: oriented x 3; appropriate

## 2019-10-29 DIAGNOSIS — E83.39 OTHER DISORDERS OF PHOSPHORUS METABOLISM: ICD-10-CM

## 2019-10-29 DIAGNOSIS — E87.6 HYPOKALEMIA: ICD-10-CM

## 2019-10-29 LAB
ANION GAP SERPL CALC-SCNC: 11 MMO/L — SIGNIFICANT CHANGE UP (ref 7–14)
BUN SERPL-MCNC: 7 MG/DL — SIGNIFICANT CHANGE UP (ref 7–23)
CALCIUM SERPL-MCNC: 9.3 MG/DL — SIGNIFICANT CHANGE UP (ref 8.4–10.5)
CHLORIDE SERPL-SCNC: 98 MMOL/L — SIGNIFICANT CHANGE UP (ref 98–107)
CO2 SERPL-SCNC: 27 MMOL/L — SIGNIFICANT CHANGE UP (ref 22–31)
CREAT SERPL-MCNC: 0.54 MG/DL — SIGNIFICANT CHANGE UP (ref 0.5–1.3)
GLUCOSE SERPL-MCNC: 102 MG/DL — HIGH (ref 70–99)
HCT VFR BLD CALC: 28.7 % — LOW (ref 34.5–45)
HGB BLD-MCNC: 9.5 G/DL — LOW (ref 11.5–15.5)
MAGNESIUM SERPL-MCNC: 2.1 MG/DL — SIGNIFICANT CHANGE UP (ref 1.6–2.6)
MCHC RBC-ENTMCNC: 30.8 PG — SIGNIFICANT CHANGE UP (ref 27–34)
MCHC RBC-ENTMCNC: 33.1 % — SIGNIFICANT CHANGE UP (ref 32–36)
MCV RBC AUTO: 93.2 FL — SIGNIFICANT CHANGE UP (ref 80–100)
NRBC # FLD: 0 K/UL — SIGNIFICANT CHANGE UP (ref 0–0)
PHOSPHATE SERPL-MCNC: 3.4 MG/DL — SIGNIFICANT CHANGE UP (ref 2.5–4.5)
PLATELET # BLD AUTO: 563 K/UL — HIGH (ref 150–400)
PMV BLD: 7.8 FL — SIGNIFICANT CHANGE UP (ref 7–13)
POTASSIUM SERPL-MCNC: 3.9 MMOL/L — SIGNIFICANT CHANGE UP (ref 3.5–5.3)
POTASSIUM SERPL-SCNC: 3.9 MMOL/L — SIGNIFICANT CHANGE UP (ref 3.5–5.3)
RBC # BLD: 3.08 M/UL — LOW (ref 3.8–5.2)
RBC # FLD: 12.6 % — SIGNIFICANT CHANGE UP (ref 10.3–14.5)
SODIUM SERPL-SCNC: 136 MMOL/L — SIGNIFICANT CHANGE UP (ref 135–145)
WBC # BLD: 4.43 K/UL — SIGNIFICANT CHANGE UP (ref 3.8–10.5)
WBC # FLD AUTO: 4.43 K/UL — SIGNIFICANT CHANGE UP (ref 3.8–10.5)

## 2019-10-29 PROCEDURE — 99238 HOSP IP/OBS DSCHRG MGMT 30/<: CPT

## 2019-10-29 PROCEDURE — 99232 SBSQ HOSP IP/OBS MODERATE 35: CPT | Mod: 24

## 2019-10-29 RX ORDER — ACETAMINOPHEN 500 MG
650 TABLET ORAL EVERY 6 HOURS
Refills: 0 | Status: DISCONTINUED | OUTPATIENT
Start: 2019-10-29 | End: 2019-10-30

## 2019-10-29 RX ORDER — IBUPROFEN 200 MG
1 TABLET ORAL
Qty: 0 | Refills: 0 | DISCHARGE
Start: 2019-10-29

## 2019-10-29 RX ORDER — IBUPROFEN 200 MG
600 TABLET ORAL EVERY 6 HOURS
Refills: 0 | Status: DISCONTINUED | OUTPATIENT
Start: 2019-10-29 | End: 2019-10-30

## 2019-10-29 RX ADMIN — SODIUM CHLORIDE 3 MILLILITER(S): 9 INJECTION INTRAMUSCULAR; INTRAVENOUS; SUBCUTANEOUS at 13:48

## 2019-10-29 RX ADMIN — Medication 650 MILLIGRAM(S): at 18:55

## 2019-10-29 RX ADMIN — SODIUM CHLORIDE 3 MILLILITER(S): 9 INJECTION INTRAMUSCULAR; INTRAVENOUS; SUBCUTANEOUS at 21:26

## 2019-10-29 RX ADMIN — Medication 650 MILLIGRAM(S): at 17:55

## 2019-10-29 RX ADMIN — ENOXAPARIN SODIUM 40 MILLIGRAM(S): 100 INJECTION SUBCUTANEOUS at 12:38

## 2019-10-29 RX ADMIN — Medication 600 MILLIGRAM(S): at 18:55

## 2019-10-29 RX ADMIN — Medication 600 MILLIGRAM(S): at 17:55

## 2019-10-29 RX ADMIN — Medication 650 MILLIGRAM(S): at 13:30

## 2019-10-29 RX ADMIN — SODIUM CHLORIDE 3 MILLILITER(S): 9 INJECTION INTRAMUSCULAR; INTRAVENOUS; SUBCUTANEOUS at 05:40

## 2019-10-29 RX ADMIN — Medication 650 MILLIGRAM(S): at 04:33

## 2019-10-29 RX ADMIN — Medication 600 MILLIGRAM(S): at 13:30

## 2019-10-29 RX ADMIN — Medication 600 MILLIGRAM(S): at 12:39

## 2019-10-29 RX ADMIN — Medication 650 MILLIGRAM(S): at 12:39

## 2019-10-29 RX ADMIN — Medication 650 MILLIGRAM(S): at 05:03

## 2019-10-29 NOTE — PROGRESS NOTE ADULT - SUBJECTIVE AND OBJECTIVE BOX
Vital Signs Last 24 Hrs  T(C): 36.7 (29 Oct 2019 04:31), Max: 36.8 (28 Oct 2019 08:30)  T(F): 98.1 (29 Oct 2019 04:31), Max: 98.2 (28 Oct 2019 08:30)  HR: 61 (29 Oct 2019 04:31) (59 - 68)  BP: 107/47 (29 Oct 2019 04:31) (107/47 - 126/64)  BP(mean): --  RR: 17 (29 Oct 2019 04:31) (17 - 20)  SpO2: 99% (29 Oct 2019 04:31) (96% - 99%)    I&O's Detail    28 Oct 2019 07:01  -  29 Oct 2019 07:00  --------------------------------------------------------  IN:    dextrose 5% + sodium chloride 0.45% with potassium chloride 20 mEq/L: 525 mL  Total IN: 525 mL    OUT:    Bulb: 25 mL    Voided: 1550 mL  Total OUT: 1575 mL    Total NET: -1050 mL                                9.5    4.43  )-----------( 563      ( 29 Oct 2019 05:40 )             28.7       10-29    136  |  98  |  7   ----------------------------<  102<H>  3.9   |  27  |  0.54    Ca    9.3      29 Oct 2019 05:40  Phos  3.4     10-29  Mg     2.1     10-29    Pelvic collection drained  Gram stain negative  Abdomen non-tender            PLAN:  ambulate  Regular diet  possible discharge home tomorrow

## 2019-10-29 NOTE — PROGRESS NOTE ADULT - ASSESSMENT
64 F hx of colon CA, s/p LAR and ileostomy reversal and recurrent SBO, presented with closed loop bowel obstruction now s/p SBR with primary anastomosis on 10/16. Now represents w/ 3 days of generalized abdominal pain and found on CT to have a pre-sacral abdominal collxn.      - Pain control  - Potential drainage of pre-sacral collxn today vs. tomorrow  - PO for potential procedure today, may have diet if procedure will go tomorrow and then make her NPO @ [facility]  - IVF      D Team Surgery #[id number]
64 F hx of colon CA, s/p LAR and ileostomy reversal and recurrent SBO, presented with closed loop bowel obstruction now s/p SBR with primary anastomosis on 10/16. Now represents w/ 3 days of generalized abdominal pain and found on CT to have a pre-sacral abdominal collxn.    - Pain control  - Regular diet  - IR consult: No pelvic drainage  - No abx  - Discharge planning
64 F hx of colon CA, s/p LAR and ileostomy reversal and recurrent SBO, presented with closed loop bowel obstruction now s/p SBR with primary anastomosis on 10/16. Now represents w/ 3 days of generalized abdominal pain and found on CT to have a pre-sacral abdominal collxn.      - Pain control  - Potential drainage of pre-sacral collxn today w/ IR  - NPO for procedure today, diet after procedure TBD per IR recs  - IVF  - post-procedure check
General

## 2019-10-29 NOTE — PROGRESS NOTE ADULT - SUBJECTIVE AND OBJECTIVE BOX
SURGERY DAILY PROGRESS NOTE:     SUBJECTIVE/24hr Events:   Patient seen and examined on am rounds.   She is doing well.  No pain. Ambulating  Tolerating regular diet    OBJECTIVE:  Vital Signs Last 24 Hrs  T(C): 36.8 (29 Oct 2019 08:36), Max: 36.8 (29 Oct 2019 08:36)  T(F): 98.2 (29 Oct 2019 08:36), Max: 98.2 (29 Oct 2019 08:36)  HR: 71 (29 Oct 2019 08:36) (59 - 71)  BP: 113/56 (29 Oct 2019 08:36) (107/47 - 113/56)  BP(mean): --  RR: 17 (29 Oct 2019 08:36) (17 - 19)  SpO2: 98% (29 Oct 2019 08:36) (97% - 99%)    PHYSICAL EXAM:  Constitutional: well developed, well nourished, NAD  ENMT: normal facies, symmetric  Respiratory: Normal respiratory effort   Psychiatric: oriented x 3; appropriate    ** I&O's **    28 Oct 2019 07:01  -  29 Oct 2019 07:00  --------------------------------------------------------  IN:    dextrose 5% + sodium chloride 0.45% with potassium chloride 20 mEq/L: 525 mL  Total IN: 525 mL    OUT:    Bulb: 25 mL    Voided: 1550 mL  Total OUT: 1575 mL    Total NET: -1050 mL          ** LABS **                        9.5    4.43  )-----------( 563      ( 29 Oct 2019 05:40 )             28.7     29 Oct 2019 05:40    136    |  98     |  7      ----------------------------<  102    3.9     |  27     |  0.54     Ca    9.3        29 Oct 2019 05:40  Phos  3.4       29 Oct 2019 05:40  Mg     2.1       29 Oct 2019 05:40        CAPILLARY BLOOD GLUCOSE                Culture - Surg Site Aerob/Anaer w/[address] [facility] (collected 28 Oct 2019 13:45)  Source: PELVIS  Preliminary Report (29 Oct 2019 08:43):    CULTURE IN PROGRESS, FURTHER REPORT TO FOLLOW.          MEDICATIONS  (STANDING):  acetaminophen   Tablet .. 650 milliGRAM(s) Oral every 6 hours  enoxaparin Injectable 40 milliGRAM(s) SubCutaneous daily  ibuprofen  Tablet. 600 milliGRAM(s) Oral every 6 hours  sodium chloride 0.9% lock flush 3 milliLiter(s) IV Push every 8 hours    MEDICATIONS  (PRN):  ondansetron Injectable 4 milliGRAM(s) IV Push every 6 hours PRN Nausea  oxyCODONE    IR 5 milliGRAM(s) Oral every 6 hours PRN Severe Pain (7 - 10)

## 2019-10-30 ENCOUNTER — APPOINTMENT (OUTPATIENT)
Dept: SURGICAL ONCOLOGY | Facility: CLINIC | Age: 64
End: 2019-10-30

## 2019-10-30 ENCOUNTER — TRANSCRIPTION ENCOUNTER (OUTPATIENT)
Age: 64
End: 2019-10-30

## 2019-10-30 VITALS
HEART RATE: 63 BPM | SYSTOLIC BLOOD PRESSURE: 111 MMHG | TEMPERATURE: 98 F | DIASTOLIC BLOOD PRESSURE: 53 MMHG | OXYGEN SATURATION: 97 % | RESPIRATION RATE: 18 BRPM

## 2019-10-30 PROCEDURE — 99232 SBSQ HOSP IP/OBS MODERATE 35: CPT | Mod: 24

## 2019-10-30 RX ADMIN — Medication 600 MILLIGRAM(S): at 11:57

## 2019-10-30 RX ADMIN — Medication 600 MILLIGRAM(S): at 17:37

## 2019-10-30 RX ADMIN — Medication 600 MILLIGRAM(S): at 13:00

## 2019-10-30 RX ADMIN — SODIUM CHLORIDE 3 MILLILITER(S): 9 INJECTION INTRAMUSCULAR; INTRAVENOUS; SUBCUTANEOUS at 10:47

## 2019-10-30 RX ADMIN — ENOXAPARIN SODIUM 40 MILLIGRAM(S): 100 INJECTION SUBCUTANEOUS at 11:56

## 2019-10-30 RX ADMIN — Medication 650 MILLIGRAM(S): at 11:56

## 2019-10-30 RX ADMIN — Medication 650 MILLIGRAM(S): at 13:00

## 2019-10-30 RX ADMIN — Medication 650 MILLIGRAM(S): at 04:10

## 2019-10-30 RX ADMIN — Medication 600 MILLIGRAM(S): at 04:10

## 2019-10-30 RX ADMIN — SODIUM CHLORIDE 3 MILLILITER(S): 9 INJECTION INTRAMUSCULAR; INTRAVENOUS; SUBCUTANEOUS at 13:41

## 2019-10-30 RX ADMIN — Medication 650 MILLIGRAM(S): at 17:37

## 2019-10-30 NOTE — PROGRESS NOTE ADULT - REASON FOR ADMISSION
Pre-sacral fluid collection

## 2019-10-30 NOTE — DISCHARGE NOTE NURSING/CASE MANAGEMENT/SOCIAL WORK - NSDCPNINST_GEN_ALL_CORE
follow up with all doctors appointments. If you experience any sign of infection such as fever ,redness or pus coming out of the surgical sites ,call your doctor. for shortness of breath and chest pain ,call [phone]

## 2019-10-30 NOTE — HISTORY OF PRESENT ILLNESS
[de-identified] : Ms Medeiros is a 64 y.o. female who presents for a post op evaluation.  She is s/p exploratory laparotomy, lysis of adhesions and release of internal hernia,  small bowel resection, mesenteric lymph node resection on 10/16/19 for a SBO.  Final path was benign with reactive LNs.\par \par \par Previous history:\par History of rectal cancer.  She began neoadjuvant chemoradiation on 5/15/17 under the care of Dr. Jones and Dr. Booker which concluded on 6/22/17.\par \par She is s/p LAR with loop ileostomy on 8/4/17. Final pathology showed residual adenocarcinoma of the rectum (0.9 cm). 13 lymph nodes and margins were negative though tumor deposits were present. Stage T2N1c. She was re admitted on 8/31/17 with an SBO which resolved with NGT decompression. She is now post/op small bowel resection to close loop ileostomy with lysis of adhesions for release of internal hernia on 12/1/17.\par \par She was hospitalized at [facility] from 12/7-12/9/18 with abdominal pain/nausea.  CT A/P obtained showed SBO with possible transition point in the midpelvis. On the day after admission she passed multiple small bowel movements and continued to pass flatus and was D/C home. \par \par She is s/p rigid sigmoidoscopy and rectal dilatation for rectal stricture from low anterior anastomosis on 1/9/19. \par \par CT C/A/P 1/16/19 -Stable exam. No evidence of metastatic disease. \par \par Blood work 1/23/19 as per Dr. Jones: CEA (1.5) LFTs WNL\par Underwent a colonoscopy by Dr. Fairbanks March 2019 which she reports was normal.\par Internist: Dr. Trevor Howell.

## 2019-10-30 NOTE — DISCHARGE NOTE NURSING/CASE MANAGEMENT/SOCIAL WORK - NSDCPETBCESMAN_GEN_ALL_CORE
08-Mar-2018 If you are a smoker, it is important for your health to stop smoking. Please be aware that second hand smoke is also harmful.

## 2019-10-30 NOTE — PROGRESS NOTE ADULT - SUBJECTIVE AND OBJECTIVE BOX
Vital Signs Last 24 Hrs  T(C): 36.4 (30 Oct 2019 11:58), Max: 37.4 (29 Oct 2019 16:41)  T(F): 97.5 (30 Oct 2019 11:58), Max: 99.3 (29 Oct 2019 16:41)  HR: 70 (30 Oct 2019 11:58) (59 - 70)  BP: 115/61 (30 Oct 2019 11:58) (104/50 - 116/53)  BP(mean): --  RR: 17 (30 Oct 2019 11:58) (17 - 18)  SpO2: 99% (30 Oct 2019 11:58) (98% - 100%)    I&O's Detail    29 Oct 2019 07:01  -  30 Oct 2019 07:00  --------------------------------------------------------  IN:  Total IN: 0 mL    OUT:    Bulb: 50 mL    Voided: 350 mL  Total OUT: 400 mL    Total NET: -400 mL                                9.5    4.43  )-----------( 563      ( 29 Oct 2019 05:40 )             28.7       10-29    136  |  98  |  7   ----------------------------<  102<H>  3.9   |  27  |  0.54    Ca    9.3      29 Oct 2019 05:40  Phos  3.4     10-29  Mg     2.1     10-29    tolerating diet            PLAN:  discharge home today with pigtail drain in place  RTO 1 week  Instructions given

## 2019-10-30 NOTE — PHYSICAL THERAPY INITIAL EVALUATION ADULT - PERTINENT HX OF CURRENT PROBLEM, REHAB EVAL
64F with history of LAR for colon CA 2017, recently with closed-loop SBO tx with ex-lap, SBR on 10/16/19, discharged 10/23, presents with 3 days of abdominal pain.

## 2019-10-30 NOTE — PHYSICAL THERAPY INITIAL EVALUATION ADULT - ADDITIONAL COMMENTS
Pt reports that she lives alone in a private house with ~3-4 steps to enter; (+)1 handrails; bedroom/bathroom on first floor. Prior to hospital admission pt was completely independent and used no assistive device with ambulation. Pt denies any recent falls and states that her son and sister will be assisting her post hospital stay.    Pt left comfortable in chair, NAD, all lines intact, all precautions maintained, and RN aware of PT evaluation.

## 2019-10-30 NOTE — REASON FOR VISIT
[Follow-Up Visit] : a follow-up visit for [Rectal Cancer] : rectal cancer [FreeTextEntry2] : s/p exploratory laparotomy, lysis of adhesions and release of internal hernia,  small bowel resection, mesenteric lymph node resection on 10/16/19

## 2019-10-30 NOTE — DISCHARGE NOTE NURSING/CASE MANAGEMENT/SOCIAL WORK - PATIENT PORTAL LINK FT
You can access the FollowMyHealth Patient Portal offered by [facility] by registering at the following website: [email or web address]. By joining [employer]’s FollowMyHealth portal, you will also be able to view your health information using other applications (apps) compatible with our system.

## 2019-10-30 NOTE — PHYSICAL THERAPY INITIAL EVALUATION ADULT - PATIENT PROFILE REVIEW, REHAB EVAL
yes/ACTIVITY: Ambulate as Tolerated; spoke with RN Quevedo Cadet prior to PT evaluation--> Pt OK for PT consult/OOB activity

## 2019-11-03 LAB — CULTURE - SURGICAL SITE: SIGNIFICANT CHANGE UP

## 2019-11-04 ENCOUNTER — APPOINTMENT (OUTPATIENT)
Dept: SURGICAL ONCOLOGY | Facility: CLINIC | Age: 64
End: 2019-11-04
Payer: MEDICARE

## 2019-11-04 VITALS
DIASTOLIC BLOOD PRESSURE: 70 MMHG | WEIGHT: 108 LBS | OXYGEN SATURATION: 98 % | RESPIRATION RATE: 17 BRPM | HEIGHT: 63 IN | SYSTOLIC BLOOD PRESSURE: 113 MMHG | HEART RATE: 70 BPM | BODY MASS INDEX: 19.14 KG/M2 | TEMPERATURE: 97.8 F

## 2019-11-04 PROCEDURE — 99024 POSTOP FOLLOW-UP VISIT: CPT

## 2019-11-04 NOTE — ASSESSMENT
[FreeTextEntry1] : Imp:\par JENNI - hx of Rectal cancer\par She presented to [facility] ER on 10/16/19 after 2 days of worsening abdominal pain, nausea w/o vomiting.  CT A/P obtained in the ED showed closed loop SBO with 2 transition points.  She went emergency to the OR on 10/16/19 for an exploratory laparotomy, IRLANDA and release of internal hernia, small bowel resection, mesenteric lymph node resection.   Final pathology was benign (segment of small bowel with serositis and serosal fibrosis and adhesions), negative margins ,multiple reactive lymph nodes. \par \par She was re-admitted to [facility] on 10/27/19 with c/o generalized abdominal pain and found on CT to have a pre-sacral abdominal collection.  She was admitted and made NPO.  She went to IR on 10/28 for drainage.  She tolerated the procedure well.  She was discharged home on 10/30/19.   SARA pigtail removed. \par \par Plan:\par Bloodwork and imaging by Dr. Jones \par RTO 3 months

## 2019-11-04 NOTE — PHYSICAL EXAM
[Normal] : well developed, well nourished, in no acute distress [de-identified] : healing midline incision with no evidence of infection; soft, ND, NT [de-identified] : right buttock pigtail with scant serosanguineous drainage  ( removed today )

## 2019-11-04 NOTE — HISTORY OF PRESENT ILLNESS
[de-identified] : Ms Medeiros is a 64 y.o. female who presents for a post op visit.  She presented to [facility] ER on 10/16/19 after 2 days of worsening abdominal pain, nausea w/o vomiting.  CT A/P obtained in the ED showed closed loop SBO with 2 transition points.  She went emergency to the OR on 10/16/19 for an exploratory laparotomy, IRLANDA and release of internal hernia, small bowel resection, mesenteric lymph node resection.   Final pathology was benign (segment of small bowel with serositis and serosal fibrosis and adhesions), negative margins ,multiple reactive lymph nodes. \par \par She was re-admitted to [facility] on 10/27/19 with c/o generalized abdominal pain and found on CT to have a pre-sacral abdominal collection.  She was admitted and made NPO.  She went to IR on 10/28 for drainage.  She tolerated the procedure well.  She was discharged home on 10/30/19. \par \par Has c/o occasional abdominal pain which is relieved with Tylenol/Motrin.  Denies nausea or vomiting. Having small Bm's and passing flatus.  She states that she has been nervous to eat and hasn't been eating much.  Denies fever or chills.  SARA draining less than 10 ml's per day. \par \par PERTINENT HISTORY:\par She began neoadjuvant chemoradiation on 5/15/17 under the care of Dr. Jones and Dr. Booker which concluded on 6/22/17.\par \par She is s/p LAR with loop ileostomy on 8/4/17. Final pathology showed residual adenocarcinoma of the rectum (0.9 cm). 13 lymph nodes and margins were negative though tumor deposits were present. Stage T2N1c. She was re admitted on 8/31/17 with an SBO which resolved with NGT decompression. She is now post/op small bowel resection to close loop ileostomy with lysis of adhesions for release of internal hernia on 12/1/17.\par \par She was hospitalized at [facility] from 12/7-12/9/18 with abdominal pain/nausea.  CT A/P obtained showed SBO with possible transition point in the midpelvis. On the day after admission she passed multiple small bowel movements and continued to pass flatus and was D/C home. \par \par She is s/p rigid sigmoidoscopy and rectal dilatation for rectal stricture from low anterior anastomosis on 1/9/19. \par \par CT C/A/P 1/16/19 -Stable exam. No evidence of metastatic disease. \par \par Blood work 1/23/19 as per Dr. Jones: CEA (1.5) LFTs WNL\par Underwent a colonoscopy by Dr. Fairbanks March 2019 which she reports was normal.\par \par She is tolerating PO intake without nausea, vomiting or abdominal pain. Reports daily BM's without BRBPR and passing flatus. Complains of experiencing a lot of malodorous flatus despite refraining from certain food.\par \par Internist: Dr. Trevor Howell.

## 2019-11-05 LAB — SPECIMEN SOURCE: SIGNIFICANT CHANGE UP

## 2019-11-07 ENCOUNTER — FORM ENCOUNTER (OUTPATIENT)
Age: 64
End: 2019-11-07

## 2019-11-08 ENCOUNTER — APPOINTMENT (OUTPATIENT)
Dept: CT IMAGING | Facility: CLINIC | Age: 64
End: 2019-11-08
Payer: MEDICARE

## 2019-11-08 ENCOUNTER — OUTPATIENT (OUTPATIENT)
Dept: OUTPATIENT SERVICES | Facility: HOSPITAL | Age: 64
LOS: 1 days | End: 2019-11-08
Payer: MEDICARE

## 2019-11-08 DIAGNOSIS — Z00.8 ENCOUNTER FOR OTHER GENERAL EXAMINATION: ICD-10-CM

## 2019-11-08 DIAGNOSIS — Z90.49 ACQUIRED ABSENCE OF OTHER SPECIFIED PARTS OF DIGESTIVE TRACT: Chronic | ICD-10-CM

## 2019-11-08 DIAGNOSIS — Z98.890 OTHER SPECIFIED POSTPROCEDURAL STATES: Chronic | ICD-10-CM

## 2019-11-08 PROCEDURE — 72192 CT PELVIS W/O DYE: CPT | Mod: 26

## 2019-11-08 PROCEDURE — 72192 CT PELVIS W/O DYE: CPT

## 2019-11-26 LAB — FUNGUS SPEC QL CULT: SIGNIFICANT CHANGE UP

## 2019-12-18 NOTE — PHYSICAL THERAPY INITIAL EVALUATION ADULT - RANGE OF MOTION EXAMINATION, REHAB EVAL
Ventricular Rate : 88  Atrial Rate : 73  QRS Duration : 108  Q-T Interval : 324  QTC Calculation(Bazett) : 392  R Axis : 35  T Axis : 166  Diagnosis : Atrial fibrillation  Nonspecific T wave abnormality  Abnormal ECG  When compared with ECG of 18-DEC-2019 11:46,  No significant change was found  Confirmed by Henry Kate ([id number]) on 12/19/2019 4:25:10 PM   bilateral lower extremity ROM was WFL (within functional limits)/bilateral upper extremity ROM was WFL (within functional limits)

## 2020-01-05 ENCOUNTER — FORM ENCOUNTER (OUTPATIENT)
Age: 65
End: 2020-01-05

## 2020-01-06 ENCOUNTER — APPOINTMENT (OUTPATIENT)
Dept: CT IMAGING | Facility: IMAGING CENTER | Age: 65
End: 2020-01-06
Payer: MEDICARE

## 2020-01-06 ENCOUNTER — OUTPATIENT (OUTPATIENT)
Dept: OUTPATIENT SERVICES | Facility: HOSPITAL | Age: 65
LOS: 1 days | End: 2020-01-06
Payer: MEDICARE

## 2020-01-06 DIAGNOSIS — C20 MALIGNANT NEOPLASM OF RECTUM: ICD-10-CM

## 2020-01-06 DIAGNOSIS — Z90.49 ACQUIRED ABSENCE OF OTHER SPECIFIED PARTS OF DIGESTIVE TRACT: Chronic | ICD-10-CM

## 2020-01-06 DIAGNOSIS — Z98.890 OTHER SPECIFIED POSTPROCEDURAL STATES: Chronic | ICD-10-CM

## 2020-01-06 PROCEDURE — 82565 ASSAY OF CREATININE: CPT

## 2020-01-06 PROCEDURE — 74177 CT ABD & PELVIS W/CONTRAST: CPT

## 2020-01-06 PROCEDURE — 74177 CT ABD & PELVIS W/CONTRAST: CPT | Mod: 26

## 2020-01-06 PROCEDURE — 71260 CT THORAX DX C+: CPT

## 2020-01-06 PROCEDURE — 71260 CT THORAX DX C+: CPT | Mod: 26

## 2020-01-21 ENCOUNTER — OUTPATIENT (OUTPATIENT)
Dept: OUTPATIENT SERVICES | Facility: HOSPITAL | Age: 65
LOS: 1 days | Discharge: HOME | End: 2020-01-21

## 2020-01-21 DIAGNOSIS — C20 MALIGNANT NEOPLASM OF RECTUM: ICD-10-CM

## 2020-01-21 DIAGNOSIS — Z90.49 ACQUIRED ABSENCE OF OTHER SPECIFIED PARTS OF DIGESTIVE TRACT: Chronic | ICD-10-CM

## 2020-01-21 DIAGNOSIS — Z98.890 OTHER SPECIFIED POSTPROCEDURAL STATES: Chronic | ICD-10-CM

## 2020-01-22 ENCOUNTER — RESULT REVIEW (OUTPATIENT)
Age: 65
End: 2020-01-22

## 2020-01-22 ENCOUNTER — APPOINTMENT (OUTPATIENT)
Dept: HEMATOLOGY ONCOLOGY | Facility: CLINIC | Age: 65
End: 2020-01-22
Payer: MEDICARE

## 2020-01-22 VITALS
OXYGEN SATURATION: 99 % | BODY MASS INDEX: 18.75 KG/M2 | DIASTOLIC BLOOD PRESSURE: 70 MMHG | TEMPERATURE: 98 F | SYSTOLIC BLOOD PRESSURE: 118 MMHG | RESPIRATION RATE: 18 BRPM | HEART RATE: 60 BPM | WEIGHT: 105.82 LBS

## 2020-01-22 LAB
ALBUMIN SERPL ELPH-MCNC: 4.5 G/DL
ALP BLD-CCNC: 32 U/L
ALT SERPL-CCNC: 11 U/L
ANION GAP SERPL CALC-SCNC: 12 MMOL/L
AST SERPL-CCNC: 23 U/L
BASOPHILS # BLD AUTO: 0 K/UL — SIGNIFICANT CHANGE UP (ref 0–0.2)
BASOPHILS NFR BLD AUTO: 0.5 % — SIGNIFICANT CHANGE UP (ref 0–2)
BILIRUB SERPL-MCNC: 0.5 MG/DL
BUN SERPL-MCNC: 13 MG/DL
CALCIUM SERPL-MCNC: 9.4 MG/DL
CEA SERPL-MCNC: 1.9 NG/ML
CHLORIDE SERPL-SCNC: 102 MMOL/L
CO2 SERPL-SCNC: 29 MMOL/L
CREAT SERPL-MCNC: 0.66 MG/DL
EOSINOPHIL # BLD AUTO: 0.1 K/UL — SIGNIFICANT CHANGE UP (ref 0–0.5)
EOSINOPHIL NFR BLD AUTO: 2.7 % — SIGNIFICANT CHANGE UP (ref 0–6)
GLUCOSE SERPL-MCNC: 81 MG/DL
HCT VFR BLD CALC: 37.9 % — SIGNIFICANT CHANGE UP (ref 34.5–45)
HGB BLD-MCNC: 12.5 G/DL — SIGNIFICANT CHANGE UP (ref 11.5–15.5)
LYMPHOCYTES # BLD AUTO: 1 K/UL — SIGNIFICANT CHANGE UP (ref 1–3.3)
LYMPHOCYTES # BLD AUTO: 33.7 % — SIGNIFICANT CHANGE UP (ref 13–44)
MCHC RBC-ENTMCNC: 31 PG — SIGNIFICANT CHANGE UP (ref 27–34)
MCHC RBC-ENTMCNC: 32.9 G/DL — SIGNIFICANT CHANGE UP (ref 32–36)
MCV RBC AUTO: 94.2 FL — SIGNIFICANT CHANGE UP (ref 80–100)
MONOCYTES # BLD AUTO: 0.2 K/UL — SIGNIFICANT CHANGE UP (ref 0–0.9)
MONOCYTES NFR BLD AUTO: 7.7 % — SIGNIFICANT CHANGE UP (ref 2–14)
NEUTROPHILS # BLD AUTO: 1.6 K/UL — LOW (ref 1.8–7.4)
NEUTROPHILS NFR BLD AUTO: 55.4 % — SIGNIFICANT CHANGE UP (ref 43–77)
PLAT MORPH BLD: NORMAL — SIGNIFICANT CHANGE UP
PLATELET # BLD AUTO: 252 K/UL — SIGNIFICANT CHANGE UP (ref 150–400)
POTASSIUM SERPL-SCNC: 4.5 MMOL/L
PROT SERPL-MCNC: 6.8 G/DL
RBC # BLD: 4.03 M/UL — SIGNIFICANT CHANGE UP (ref 3.8–5.2)
RBC # FLD: 12.9 % — SIGNIFICANT CHANGE UP (ref 10.3–14.5)
RBC BLD AUTO: SIGNIFICANT CHANGE UP
SODIUM SERPL-SCNC: 142 MMOL/L
WBC # BLD: 2.9 K/UL — LOW (ref 3.8–10.5)
WBC # FLD AUTO: 2.9 K/UL — LOW (ref 3.8–10.5)

## 2020-01-22 PROCEDURE — 99214 OFFICE O/P EST MOD 30 MIN: CPT

## 2020-01-22 NOTE — RESULTS/DATA
[FreeTextEntry1] : \par \par EXAM: CT CHEST IC \par \par EXAM: CT ABDOMEN AND PELVIS OC IC \par \par \par PROCEDURE DATE: 01/06/2020 \par \par \par \par INTERPRETATION: CLINICAL INFORMATION: 64-year-old female with history \par rectal adenocarcinoma. Change in bowel habits. History the anterior \par resection and ileostomy reversal. Closed loop obstruction and internal \par hernia. \par \par COMPARISON: CT scan 01/16/2019 and 10/27/2019. \par \par PROCEDURE: \par CT of the Chest, Abdomen and Pelvis was performed with intravenous contrast. \par Intravenous contrast: 90 ml Omnipaque 350. 10 ml discarded. \par Oral contrast: Positive contrast was administered. \par Sagittal and coronal reformats were performed. \par \par FINDINGS: \par \par CHEST: \par \par LUNGS AND LARGE AIRWAYS: Patent central airways. Granulomata. \par PLEURA: No pleural effusion. \par VESSELS: Within normal limits. \par HEART: Heart size is normal. No pericardial effusion. \par MEDIASTINUM AND CEASAR: No lymphadenopathy. \par CHEST WALL AND LOWER NECK: Within normal limits. \par \par ABDOMEN AND PELVIS: \par \par LIVER: Within normal limits. \par BILE DUCTS: Normal caliber. \par GALLBLADDER: Within normal limits. \par SPLEEN: Within normal limits. \par PANCREAS: Within normal limits. \par ADRENALS: Within normal limits. \par KIDNEYS/URETERS: Within normal limits. \par \par BLADDER: Within normal limits. \par REPRODUCTIVE ORGANS: Uterus and adnexa within normal limits. \par \par BOWEL: No bowel obstruction. Appendix normal. Rectosigmoid and small bowel \par anastomoses. \par PERITONEUM: No ascites. \par VESSELS: Within normal limits. \par RETROPERITONEUM/LYMPH NODES: No lymphadenopathy. Resolution presacral \par collection. Presacral soft tissue thickening. \par ABDOMINAL WALL: Within normal limits. \par BONES: Within normal limits. \par \par IMPRESSION: \par \par Resolution presacral collection with residual presacral soft tissue \par thickening. \par No recurrent or metastatic disease identified. \par \par \par \par \par \par \par \par \par \par \par RAY APONTE M.D., ATTENDING RADIOLOGIST \par This document has been electronically signed. Jan 6 2020 5:15PM

## 2020-01-22 NOTE — HISTORY OF PRESENT ILLNESS
[Disease: _____________________] : Disease: [unfilled] [T: ___] : T[unfilled] [N: ___] : N[unfilled] [M: ___] : M[unfilled] [AJCC Stage: ____] : AJCC Stage: [unfilled] [de-identified] : 62-year-old Slov[place] female with PMH of HTN who had a Colonoscopy March 31,2017 due to lower abdominal pain and bloody BMs by dr Adis Faribanks.  She was found to have a 3 cm infiltrating mass 7 cm from the anal verge with central ulceration and biopsy confirmed moderately differentiated invasive adenocarcinoma.  A CT a/p on 3/24/17 was negative for mets.  Her MRI pelvis from 3/31 showed some possible posterior rectal wall thickening as well as one enlarged perirectal lymph node and uterine fibroids but no obvious rectal mass was noted.  Patient consulted Dr Moore 4/5 and plan was for low anterior resection of rectum after chemoradiation.  Patient presented to us 4/11/2017 to plan further treatment.  She was started on RT/Xeloda 5/15/17 (around the same time lost her  in an accident) and planned to finish 6/22/17 then had resection in 8/17 \par \par Pt did not return for post surgery chemotherapy till January 2018 so given significant delay, adjuvant chemotherapy was not received.  \par \par She underwent loop ileostomy 8/2017 and then reversal was in 12/2017 by Dr Moore.  She was followed on surveillance thereafter. She had CT c/a/p done 1/16/19 which showed stable findings.  She was admitted to [facility] for SBO on 12/7/18 but that resolved with conservative management.  \par \par She had colonoscopy 3/2019 with Dr Fairbanks which was ok and is to repeat a sigmoidoscopy in 03/2020 [de-identified] : moderately differentiated invasive adenocarcinoma [de-identified] : DOMINIC\par  [FreeTextEntry1] : RT/Xeloda 5/15-6/22/17 --> loop ileostomy 8/2017 and then reversal was in 12/2017 [de-identified] : Shanita comes in for follow up while 2.5 yrs into surveillance in the interim, she was admitted to [facility] on 10/16/19 for closed loop SBO and underwent emergency exploratory laparotomy, IRLANDA and release of internal hernia, small bowel resection, mesenteric lymph node resection of which the final pathology was benign (segment of small bowel with serositis and serosal fibrosis and adhesions), negative margins ,multiple reactive lymph nodes.  She was re-admitted to [facility] on 10/27/19 with abdominal pain and found to have a pre-sacral abdominal collection and underwent IR  guided drainage on 10/28/19 which she tolerated well and was discharged home with outpatient follow up with Dr Moore.  The SARA pigtail was eventually removed.  She had scans done 1/6/2020 which showed Resolution presacral collection with residual presacral soft tissue \par thickening and no recurrent or metastatic disease identified. \par \par Feeling well since surgery, has had follow-up with Dr. Moore in November 2019. \par Denies any nausea, abdominal pain.  Having regular bowel movements.\par Due for sigmoidoscopy with Dr Fairbanks in March 2020.\par \par

## 2020-01-22 NOTE — REVIEW OF SYSTEMS
[Anxiety] : anxiety [Negative] : Allergic/Immunologic [Fever] : no fever [Fatigue] : no fatigue [Recent Change In Weight] : ~T no recent weight change [SOB on Exertion] : no shortness of breath during exertion [Lower Ext Edema] : no lower extremity edema [Abdominal Pain] : no abdominal pain [Vomiting] : no vomiting [Skin Rash] : no skin rash [Diarrhea] : no diarrhea [FreeTextEntry7] : no rectal pain

## 2020-01-23 NOTE — ASU PREOP CHECKLIST - # OF UNITS
[facility]  [address]  [place] [address]  Phone:  [phone]                                    Ju[place]al [place]   MRN: [id number]    Department:  [facility]   Date of Visit:  1/23/2020           After Visit Summary Signature Page    I have received my discharge instructions, and my questions have been answered. I have discussed any challenges I see with this plan with the nurse or doctor.    ..........................................................................................................................................  Patient/Patient Representative Signature      ..........................................................................................................................................  Patient Representative Print Name and Relationship to Patient    ..................................................               ................................................  Date                                   Time    ..........................................................................................................................................  Reviewed by Signature/Title    ...................................................              ..............................................  Date                                               Time          22EPIC Rev 08/18        2

## 2020-02-17 ENCOUNTER — APPOINTMENT (OUTPATIENT)
Dept: SURGICAL ONCOLOGY | Facility: CLINIC | Age: 65
End: 2020-02-17
Payer: MEDICARE

## 2020-02-17 VITALS
OXYGEN SATURATION: 99 % | WEIGHT: 104 LBS | BODY MASS INDEX: 18.43 KG/M2 | HEART RATE: 58 BPM | HEIGHT: 63 IN | DIASTOLIC BLOOD PRESSURE: 71 MMHG | SYSTOLIC BLOOD PRESSURE: 124 MMHG

## 2020-02-17 PROCEDURE — 99214 OFFICE O/P EST MOD 30 MIN: CPT

## 2020-02-17 NOTE — CONSULT LETTER
[Dear  ___] : Dear  [unfilled], [Please see my note below.] : Please see my note below. [Consult Closing:] : Thank you very much for allowing me to participate in the care of this patient.  If you have any questions, please do not hesitate to contact me. [Courtesy Letter:] : I had the pleasure of seeing your patient, [unfilled], in my office today. [FreeTextEntry1] : I will keep you informed of my follow-up. [Sincerely,] : Sincerely, [FreeTextEntry3] : Kory Moore MD FACS\par Chief of Surgical Oncology\par \par  [DrThanh  ___] : Dr. CRUZ

## 2020-02-17 NOTE — ASSESSMENT
[FreeTextEntry1] : Imp:\par JENNI - hx of Rectal cancer\par She presented to [facility] ER on 10/16/19 after 2 days of worsening abdominal pain, nausea w/o vomiting.  CT A/P obtained in the ED showed closed loop SBO with 2 transition points.  She went emergency to the OR on 10/16/19 for an exploratory laparotomy, IRLANDA and release of internal hernia, small bowel resection, mesenteric lymph node resection.   Final pathology was benign. \par CT C/A/P 1/6/2020- Resolution of presacral collection with residual presacral soft tissue thickening . No recurrent or metastatic disease is identified. \par \par Plan:\par Bloodwork and imaging by Dr. Jones \par Colonoscopy as per Dr. Fairbanks (scheduled to see Dr. Fairbanks 3/2020)\par RTO q6 months

## 2020-02-17 NOTE — PHYSICAL EXAM
[Normal] : supple, no neck mass and thyroid not enlarged [Normal Supraclavicular Lymph Nodes] : normal supraclavicular lymph nodes [Normal Groin Lymph Nodes] : normal groin lymph nodes [Normal] : grossly intact [de-identified] : healed midline incision; soft, ND, NT

## 2020-02-17 NOTE — HISTORY OF PRESENT ILLNESS
[de-identified] : Ms Medeiros is a 64 y.o. female who presents for a 3 month follow up visit.  \par \par Hx rectal adenocarcinoma \par She began neoadjuvant chemoradiation on 5/15/17 under the care of Dr. Jones and Dr. Booker which concluded on 6/22/17.\par \par She is s/p LAR with loop ileostomy on 8/4/17. Final pathology showed residual adenocarcinoma of the rectum (0.9 cm). 13 lymph nodes and margins were negative though tumor deposits were present. Stage T2N1c. She was re admitted on 8/31/17 with an SBO which resolved with NGT decompression. She is now post/op small bowel resection to close loop ileostomy with lysis of adhesions for release of internal hernia on 12/1/17.\par \par She was hospitalized at [facility] from 12/7-12/9/18 with abdominal pain/nausea.  CT A/P obtained showed SBO with possible transition point in the midpelvis. On the day after admission she passed multiple small bowel movements and continued to pass flatus and was D/C home. \par \par She is s/p rigid sigmoidoscopy and rectal dilatation for rectal stricture from low anterior anastomosis on 1/9/19. \par \par CT C/A/P 1/16/19 -Stable exam. No evidence of metastatic disease. \par \par Blood work 1/23/19 as per Dr. Jones: CEA (1.5) LFTs WNL\par Underwent a colonoscopy by Dr. Fairbanks March 2019 which she reports was normal.\par \par She presented to [facility] ER on 10/16/19 after 2 days of worsening abdominal pain, nausea w/o vomiting.  CT A/P obtained in the ED showed closed loop SBO with 2 transition points.  She went emergency to the OR on 10/16/19 for an exploratory laparotomy, IRLANDA and release of internal hernia, small bowel resection, mesenteric lymph node resection.   Final pathology was benign (segment of small bowel with serositis and serosal fibrosis and adhesions), negative margins ,multiple reactive lymph nodes. \par \par She was re-admitted to [facility] on 10/27/19 with c/o generalized abdominal pain and found on CT to have a pre-sacral abdominal collection.  She was admitted and made NPO.  She went to IR on 10/28 for drainage.  She tolerated the procedure well.  She was discharged home on 10/30/19. \par \par CT C/A/P 1/6/2020- Resolution of presacral collection with residual presacral soft tissue thickening . No recurrent or metastatic disease is identified. \par \par Denies abdominal pain, nausea, vomiting, changes in appetite or bowel habits. Denies BRBPR.   States she is scheduled to f/u with Dr. Fairbanks in 3/2020 and will possibly have a repeat colonoscopy. \par \par Internist: Dr. Trevor Howell.

## 2020-02-21 NOTE — ASU PATIENT PROFILE, ADULT - NSSUBSTANCEUSE_GEN_ALL_CORE_SD
Called patient and made aware of Dr Lavonda Burkitt note in detail. States she did have symptoms at time of exam. Still has a \"lump\", but is getting better. Still taking steroids. caffeine

## 2020-02-21 NOTE — ASU PATIENT PROFILE, ADULT - TEACHING/LEARNING RELIGIOUS CONSIDERATIONS
· No anginal symptoms and normal nuclear stress test last March  · Continue low-dose aspirin.  Clopidogrel not indicated on basis of CAD.  Hematology has recommended clopidogrel in addition to warfarin for hypercoagulable state  · Start metoprolol and intensify statin therapy   none

## 2020-06-17 ENCOUNTER — OUTPATIENT (OUTPATIENT)
Dept: OUTPATIENT SERVICES | Facility: HOSPITAL | Age: 65
LOS: 1 days | Discharge: HOME | End: 2020-06-17

## 2020-06-17 DIAGNOSIS — C20 MALIGNANT NEOPLASM OF RECTUM: ICD-10-CM

## 2020-06-17 DIAGNOSIS — Z90.49 ACQUIRED ABSENCE OF OTHER SPECIFIED PARTS OF DIGESTIVE TRACT: Chronic | ICD-10-CM

## 2020-06-17 DIAGNOSIS — Z98.890 OTHER SPECIFIED POSTPROCEDURAL STATES: Chronic | ICD-10-CM

## 2020-06-19 NOTE — HISTORY OF PRESENT ILLNESS
[Disease: _____________________] : Disease: [unfilled] [N: ___] : N[unfilled] [M: ___] : M[unfilled] [T: ___] : T[unfilled] [AJCC Stage: ____] : AJCC Stage: [unfilled] [de-identified] : 62-year-old [place] female with PMH of HTN who had a Colonoscopy March 31,2017 due to lower abdominal pain and bloody BMs by dr Adis Fairbanks.  She was found to have a 3 cm infiltrating mass 7 cm from the anal verge with central ulceration and biopsy confirmed moderately differentiated invasive adenocarcinoma.  A CT a/p on 3/24/17 was negative for mets.  Her MRI pelvis from 3/31 showed some possible posterior rectal wall thickening as well as one enlarged perirectal lymph node and uterine fibroids but no obvious rectal mass was noted.  Patient consulted Dr Moore 4/5 and plan was for low anterior resection of rectum after chemoradiation.  Patient presented to us 4/11/2017 to plan further treatment.  She was started on RT/Xeloda 5/15/17 (around the same time lost her  in an accident) and planned to finish 6/22/17 then had resection in 8/17 \par \par Pt did not return for post surgery chemotherapy till January 2018 so given significant delay, adjuvant chemotherapy was not received.  \par \par She underwent loop ileostomy 8/2017 and then reversal was in 12/2017 by Dr Moore.  She was followed on surveillance thereafter. She had CT c/a/p done 1/16/19 which showed stable findings.  She was admitted to [facility] for SBO on 12/7/18 but that resolved with conservative management.  \par \par She had colonoscopy 3/2019 with Dr Fairbanks which was ok and is to repeat a sigmoidoscopy in 03/2020\par \par she was admitted to [facility] on 10/16/19 for closed loop SBO and underwent emergency exploratory laparotomy, IRLANDA and release of internal hernia, small bowel resection, mesenteric lymph node resection of which the final pathology was benign (segment of small bowel with serositis and serosal fibrosis and adhesions), negative margins ,multiple reactive lymph nodes.  She was re-admitted to [facility] on 10/27/19 with abdominal pain and found to have a pre-sacral abdominal collection and underwent IR  guided drainage on 10/28/19 which she tolerated well and was discharged home with outpatient follow up with Dr Moore.  The SARA pigtail was eventually removed.  She had scans done 1/6/2020 which showed Resolution presacral collection with residual presacral soft tissue \par thickening and no recurrent or metastatic disease identified.  [FreeTextEntry1] : RT/Xeloda 5/15-6/22/17 --> loop ileostomy 8/2017 and then reversal was in 12/2017 [de-identified] : moderately differentiated invasive adenocarcinoma [de-identified] : DOMINIC\par  [de-identified] : Shanita comes in for follow up while 2.5 yrs into surveillance \par \par ?follow-up with Dr. Moore in November 2019. \par ?Denies any nausea, abdominal pain.  Having regular bowel movements.\par ?Due for sigmoidoscopy with Dr Fairbanks in March 2020.\par \par

## 2020-06-19 NOTE — REVIEW OF SYSTEMS
[Fatigue] : no fatigue [Fever] : no fever [Recent Change In Weight] : ~T no recent weight change [Lower Ext Edema] : no lower extremity edema [SOB on Exertion] : no shortness of breath during exertion [Vomiting] : no vomiting [Diarrhea] : no diarrhea [Abdominal Pain] : no abdominal pain [Anxiety] : anxiety [Skin Rash] : no skin rash [Negative] : Allergic/Immunologic [FreeTextEntry7] : no rectal pain

## 2020-06-22 ENCOUNTER — APPOINTMENT (OUTPATIENT)
Dept: HEMATOLOGY ONCOLOGY | Facility: CLINIC | Age: 65
End: 2020-06-22

## 2020-06-22 LAB
ALBUMIN SERPL ELPH-MCNC: 4.3 G/DL
ALP BLD-CCNC: 32 U/L
ALT SERPL-CCNC: 12 U/L
ANION GAP SERPL CALC-SCNC: 13 MMOL/L
AST SERPL-CCNC: 25 U/L
BASOPHILS # BLD AUTO: 0.02 K/UL
BASOPHILS NFR BLD AUTO: 0.6 %
BILIRUB SERPL-MCNC: 0.7 MG/DL
BUN SERPL-MCNC: 16 MG/DL
CALCIUM SERPL-MCNC: 9.3 MG/DL
CEA SERPL-MCNC: 1.8 NG/ML
CHLORIDE SERPL-SCNC: 103 MMOL/L
CO2 SERPL-SCNC: 26 MMOL/L
CREAT SERPL-MCNC: 0.62 MG/DL
EOSINOPHIL # BLD AUTO: 0.06 K/UL
EOSINOPHIL NFR BLD AUTO: 1.8 %
GLUCOSE SERPL-MCNC: 96 MG/DL
HCT VFR BLD CALC: 38.4 %
HGB BLD-MCNC: 12.1 G/DL
IMM GRANULOCYTES NFR BLD AUTO: 0.3 %
LYMPHOCYTES # BLD AUTO: 1.02 K/UL
LYMPHOCYTES NFR BLD AUTO: 30.4 %
MAN DIFF?: NORMAL
MCHC RBC-ENTMCNC: 31.1 PG
MCHC RBC-ENTMCNC: 31.5 GM/DL
MCV RBC AUTO: 98.7 FL
MONOCYTES # BLD AUTO: 0.25 K/UL
MONOCYTES NFR BLD AUTO: 7.4 %
NEUTROPHILS # BLD AUTO: 2 K/UL
NEUTROPHILS NFR BLD AUTO: 59.5 %
PLATELET # BLD AUTO: 202 K/UL
POTASSIUM SERPL-SCNC: 4.3 MMOL/L
PROT SERPL-MCNC: 6.5 G/DL
RBC # BLD: 3.89 M/UL
RBC # FLD: 12.7 %
SODIUM SERPL-SCNC: 141 MMOL/L
WBC # FLD AUTO: 3.36 K/UL

## 2020-06-24 ENCOUNTER — APPOINTMENT (OUTPATIENT)
Dept: HEMATOLOGY ONCOLOGY | Facility: CLINIC | Age: 65
End: 2020-06-24
Payer: MEDICARE

## 2020-06-24 PROCEDURE — 99214 OFFICE O/P EST MOD 30 MIN: CPT | Mod: 95

## 2020-06-24 NOTE — REVIEW OF SYSTEMS
[Anxiety] : anxiety [Negative] : Heme/Lymph [Fever] : no fever [Fatigue] : no fatigue [Recent Change In Weight] : ~T no recent weight change [Lower Ext Edema] : no lower extremity edema [SOB on Exertion] : no shortness of breath during exertion [Abdominal Pain] : no abdominal pain [Vomiting] : no vomiting [Diarrhea] : no diarrhea [Skin Rash] : no skin rash [FreeTextEntry7] : no rectal pain

## 2020-06-24 NOTE — HISTORY OF PRESENT ILLNESS
[Disease: _____________________] : Disease: [unfilled] [N: ___] : N[unfilled] [T: ___] : T[unfilled] [M: ___] : M[unfilled] [AJCC Stage: ____] : AJCC Stage: [unfilled] [Medical Office: ([facility])___] : at the medical office located in  [Home] : at home, [unfilled] , at the time of the visit. [Verbal consent obtained from patient] : the patient, [unfilled] [de-identified] : 62-year-old Telugu female with PMH of HTN who had a Colonoscopy March 31,2017 due to lower abdominal pain and bloody BMs by dr Adis Fairbanks.  She was found to have a 3 cm infiltrating mass 7 cm from the anal verge with central ulceration and biopsy confirmed moderately differentiated invasive adenocarcinoma.  A CT a/p on 3/24/17 was negative for mets.  Her MRI pelvis from 3/31 showed some possible posterior rectal wall thickening as well as one enlarged perirectal lymph node and uterine fibroids but no obvious rectal mass was noted.  Patient consulted Dr Moore 4/5 and plan was for low anterior resection of rectum after chemoradiation.  Patient presented to us 4/11/2017 to plan further treatment.  She was started on RT/Xeloda 5/15/17 (around the same time lost her  in an accident) and planned to finish 6/22/17 then had resection in 8/17 \par \par Pt did not return for post surgery chemotherapy till January 2018 so given significant delay, adjuvant chemotherapy was not received.  \par \par She underwent loop ileostomy 8/2017 and then reversal was in 12/2017 by Dr Moore.  She was followed on surveillance thereafter. She had CT c/a/p done 1/16/19 which showed stable findings.  She was admitted to [facility] for SBO on 12/7/18 but that resolved with conservative management.  \par \par she was admitted to [facility] on 10/16/19 for closed loop SBO and underwent emergency exploratory laparotomy, IRLANDA and release of internal hernia, small bowel resection, mesenteric lymph node resection of which the final pathology was benign (segment of small bowel with serositis and serosal fibrosis and adhesions), negative margins ,multiple reactive lymph nodes.  She was re-admitted to [facility] on 10/27/19 with abdominal pain and found to have a pre-sacral abdominal collection and underwent IR  guided drainage on 10/28/19 which she tolerated well and was discharged home with outpatient follow up with Dr Moore. \par \par She had colonoscopy 3/2019 with Dr Fairbanks which was ok and is to repeat a sigmoidoscopy in 03/2020\par  [de-identified] : moderately differentiated invasive adenocarcinoma [de-identified] : DOMINIC\par  [FreeTextEntry1] : RT/Xeloda 5/15-6/22/17 --> loop ileostomy 8/2017 and then reversal was in 12/2017 [de-identified] : Shanita comes in for follow up while 2.5 yrs into surveillance and had blood work on 6/22/2020 which was reviewed.  She feels well overall and denies any issues including  fatigue, CP, palpitations, WOLF, n/v/d/c, abdominal pain, LE edema, weight/appetite changes and keeps active.  She had colonoscopy with Dr Marlo Fairbanks on 3/5/2020 which was normal.  \par \par

## 2020-06-24 NOTE — PHYSICAL EXAM
[Fully active, able to carry on all pre-disease performance without restriction] : Status 0 - Fully active, able to carry on all pre-disease performance without restriction [Normal] : grossly intact [de-identified] : anicteric  [de-identified] : neck supple  [de-identified] : speaking comfortably; no adventitious lung sounds

## 2020-08-10 ENCOUNTER — APPOINTMENT (OUTPATIENT)
Dept: SURGICAL ONCOLOGY | Facility: CLINIC | Age: 65
End: 2020-08-10
Payer: MEDICARE

## 2020-08-10 VITALS
SYSTOLIC BLOOD PRESSURE: 133 MMHG | HEART RATE: 65 BPM | BODY MASS INDEX: 18.43 KG/M2 | HEIGHT: 63 IN | WEIGHT: 104 LBS | DIASTOLIC BLOOD PRESSURE: 77 MMHG | OXYGEN SATURATION: 97 % | RESPIRATION RATE: 15 BRPM

## 2020-08-10 PROCEDURE — 99214 OFFICE O/P EST MOD 30 MIN: CPT

## 2020-08-10 NOTE — PHYSICAL EXAM
[Normal] : supple, no neck mass and thyroid not enlarged [Normal Supraclavicular Lymph Nodes] : normal supraclavicular lymph nodes [Normal Groin Lymph Nodes] : normal groin lymph nodes [Normal] : oriented to person, place and time, with appropriate affect [de-identified] : Midline scar but no masses

## 2020-08-10 NOTE — HISTORY OF PRESENT ILLNESS
[de-identified] : Ms Medeiros is a 65 y.o. female who presents for a 6 month follow up visit.  \par \par Hx rectal adenocarcinoma \par She began neoadjuvant chemoradiation on 5/15/17 under the care of Dr. Jones and Dr. Booker which concluded on 6/22/17.\par \par She is s/p LAR with loop ileostomy on 8/4/17. Final pathology showed residual adenocarcinoma of the rectum (0.9 cm). 13 lymph nodes and margins were negative though tumor deposits were present. Stage T2N1c. She was re admitted on 8/31/17 with an SBO which resolved with NGT decompression. She is now post/op small bowel resection to close loop ileostomy with lysis of adhesions for release of internal hernia on 12/1/17.\par \par She was hospitalized at [facility] from 12/7-12/9/18 with abdominal pain/nausea.  CT A/P obtained showed SBO with possible transition point in the midpelvis. On the day after admission she passed multiple small bowel movements and continued to pass flatus and was D/C home. \par \par She is s/p rigid sigmoidoscopy and rectal dilatation for rectal stricture from low anterior anastomosis on 1/9/19. \par \par CT C/A/P 1/16/19 -Stable exam. No evidence of metastatic disease. \par \par She presented to [facility] ER on 10/16/19 after 2 days of worsening abdominal pain, nausea w/o vomiting.  CT A/P obtained in the ED showed closed loop SBO with 2 transition points.  She went emergency to the OR on 10/16/19 for an exploratory laparotomy, IRLANDA and release of internal hernia, small bowel resection, mesenteric lymph node resection.   Final pathology was benign (segment of small bowel with serositis and serosal fibrosis and adhesions), negative margins ,multiple reactive lymph nodes. \par \par She was re-admitted to [facility] on 10/27/19 with c/o generalized abdominal pain and found on CT to have a pre-sacral abdominal collection.  She was admitted and made NPO.  She went to IR on 10/28 for drainage.  She tolerated the procedure well.  She was discharged home on 10/30/19. \par \par CT C/A/P 1/6/2020- Resolution of presacral collection with residual presacral soft tissue thickening . No recurrent or metastatic disease is identified. \par \par Underwent a colonoscopy by Dr. Fairbanks March 2020 which was unremarkable with no evidence of recurrence.\par \par Blood work 6/22/20 as per Dr. Jones: CEA (1.8) LFTs WNL\par \par Denies abdominal pain, nausea, vomiting, changes in appetite or bowel habits. Denies BRBPR.  \par \par Internist: Dr. Trevor Howell.

## 2020-08-10 NOTE — ASSESSMENT
[FreeTextEntry1] : Imp:\par JENNI - hx of Rectal cancer\par \par Plan:\par Bloodwork by Dr. Jones \par Colonoscopy as per Dr. Fairbanks  ( negative in march, 2020 )\par CT january, 2021\par RTO q6 months

## 2020-09-12 ENCOUNTER — APPOINTMENT (OUTPATIENT)
Dept: ULTRASOUND IMAGING | Facility: CLINIC | Age: 65
End: 2020-09-12
Payer: MEDICARE

## 2020-09-12 ENCOUNTER — OUTPATIENT (OUTPATIENT)
Dept: OUTPATIENT SERVICES | Facility: HOSPITAL | Age: 65
LOS: 1 days | End: 2020-09-12
Payer: MEDICARE

## 2020-09-12 DIAGNOSIS — Z98.890 OTHER SPECIFIED POSTPROCEDURAL STATES: Chronic | ICD-10-CM

## 2020-09-12 DIAGNOSIS — Z90.49 ACQUIRED ABSENCE OF OTHER SPECIFIED PARTS OF DIGESTIVE TRACT: Chronic | ICD-10-CM

## 2020-09-12 DIAGNOSIS — Z00.8 ENCOUNTER FOR OTHER GENERAL EXAMINATION: ICD-10-CM

## 2020-09-12 PROCEDURE — 76700 US EXAM ABDOM COMPLETE: CPT | Mod: 26

## 2020-09-12 PROCEDURE — 76700 US EXAM ABDOM COMPLETE: CPT

## 2020-09-17 ENCOUNTER — OUTPATIENT (OUTPATIENT)
Dept: OUTPATIENT SERVICES | Facility: HOSPITAL | Age: 65
LOS: 1 days | Discharge: HOME | End: 2020-09-17

## 2020-09-17 DIAGNOSIS — C20 MALIGNANT NEOPLASM OF RECTUM: ICD-10-CM

## 2020-09-17 DIAGNOSIS — Z98.890 OTHER SPECIFIED POSTPROCEDURAL STATES: Chronic | ICD-10-CM

## 2020-09-17 DIAGNOSIS — Z90.49 ACQUIRED ABSENCE OF OTHER SPECIFIED PARTS OF DIGESTIVE TRACT: Chronic | ICD-10-CM

## 2020-09-18 ENCOUNTER — RESULT REVIEW (OUTPATIENT)
Age: 65
End: 2020-09-18

## 2020-09-18 ENCOUNTER — APPOINTMENT (OUTPATIENT)
Dept: HEMATOLOGY ONCOLOGY | Facility: CLINIC | Age: 65
End: 2020-09-18
Payer: MEDICARE

## 2020-09-18 VITALS
DIASTOLIC BLOOD PRESSURE: 74 MMHG | SYSTOLIC BLOOD PRESSURE: 121 MMHG | WEIGHT: 105.82 LBS | RESPIRATION RATE: 16 BRPM | BODY MASS INDEX: 18.75 KG/M2 | OXYGEN SATURATION: 100 % | HEART RATE: 55 BPM | TEMPERATURE: 97.8 F

## 2020-09-18 LAB
ALBUMIN SERPL ELPH-MCNC: 4.7 G/DL
ALP BLD-CCNC: 43 U/L
ALT SERPL-CCNC: 24 U/L
ANION GAP SERPL CALC-SCNC: 10 MMOL/L
AST SERPL-CCNC: 26 U/L
BASOPHILS # BLD AUTO: 0.03 K/UL — SIGNIFICANT CHANGE UP (ref 0–0.2)
BASOPHILS NFR BLD AUTO: 0.9 % — SIGNIFICANT CHANGE UP (ref 0–2)
BILIRUB SERPL-MCNC: 0.6 MG/DL
BUN SERPL-MCNC: 17 MG/DL
CALCIUM SERPL-MCNC: 10 MG/DL
CEA SERPL-MCNC: 2.2 NG/ML
CHLORIDE SERPL-SCNC: 100 MMOL/L
CO2 SERPL-SCNC: 30 MMOL/L
CREAT SERPL-MCNC: 0.66 MG/DL
EOSINOPHIL # BLD AUTO: 0.06 K/UL — SIGNIFICANT CHANGE UP (ref 0–0.5)
EOSINOPHIL NFR BLD AUTO: 1.9 % — SIGNIFICANT CHANGE UP (ref 0–6)
GLUCOSE SERPL-MCNC: 89 MG/DL
HCT VFR BLD CALC: 40.4 % — SIGNIFICANT CHANGE UP (ref 34.5–45)
HGB BLD-MCNC: 12.8 G/DL — SIGNIFICANT CHANGE UP (ref 11.5–15.5)
IMM GRANULOCYTES NFR BLD AUTO: 0.3 % — SIGNIFICANT CHANGE UP (ref 0–1.5)
LPL SERPL-CCNC: 74 U/L
LYMPHOCYTES # BLD AUTO: 0.77 K/UL — LOW (ref 1–3.3)
LYMPHOCYTES # BLD AUTO: 24.4 % — SIGNIFICANT CHANGE UP (ref 13–44)
MCHC RBC-ENTMCNC: 30.8 PG — SIGNIFICANT CHANGE UP (ref 27–34)
MCHC RBC-ENTMCNC: 31.7 G/DL — LOW (ref 32–36)
MCV RBC AUTO: 97.3 FL — SIGNIFICANT CHANGE UP (ref 80–100)
MONOCYTES # BLD AUTO: 0.22 K/UL — SIGNIFICANT CHANGE UP (ref 0–0.9)
MONOCYTES NFR BLD AUTO: 7 % — SIGNIFICANT CHANGE UP (ref 2–14)
NEUTROPHILS # BLD AUTO: 2.07 K/UL — SIGNIFICANT CHANGE UP (ref 1.8–7.4)
NEUTROPHILS NFR BLD AUTO: 65.5 % — SIGNIFICANT CHANGE UP (ref 43–77)
NRBC # BLD: 0 /100 WBCS — SIGNIFICANT CHANGE UP (ref 0–0)
PLATELET # BLD AUTO: 250 K/UL — SIGNIFICANT CHANGE UP (ref 150–400)
POTASSIUM SERPL-SCNC: 4.9 MMOL/L
PROT SERPL-MCNC: 7.5 G/DL
RBC # BLD: 4.15 M/UL — SIGNIFICANT CHANGE UP (ref 3.8–5.2)
RBC # FLD: 13.2 % — SIGNIFICANT CHANGE UP (ref 10.3–14.5)
SODIUM SERPL-SCNC: 141 MMOL/L
WBC # BLD: 3.16 K/UL — LOW (ref 3.8–10.5)
WBC # FLD AUTO: 3.16 K/UL — LOW (ref 3.8–10.5)

## 2020-09-18 PROCEDURE — 99215 OFFICE O/P EST HI 40 MIN: CPT

## 2020-09-18 RX ORDER — MULTIVIT-MIN/FOLIC/VIT K/LYCOP 400-300MCG
1000 TABLET ORAL DAILY
Refills: 0 | Status: DISCONTINUED | COMMUNITY
Start: 2019-01-23 | End: 2020-09-18

## 2020-09-18 RX ORDER — ELECTROLYTES/DEXTROSE
SOLUTION, ORAL ORAL DAILY
Refills: 0 | Status: ACTIVE | COMMUNITY
Start: 2020-09-18

## 2020-09-18 NOTE — PHYSICAL EXAM
[Fully active, able to carry on all pre-disease performance without restriction] : Status 0 - Fully active, able to carry on all pre-disease performance without restriction [Normal] : normal spine exam without palpable tenderness, no kyphosis or scoliosis [de-identified] : anicteric  [de-identified] : neck supple  [de-identified] : speaking comfortably; no adventitious lung sounds  [de-identified] : tenderness to deep palpation in epigastic area, no rebound, no guarding

## 2020-09-18 NOTE — REVIEW OF SYSTEMS
[Anxiety] : anxiety [Negative] : Allergic/Immunologic [Fever] : no fever [Fatigue] : no fatigue [Recent Change In Weight] : ~T no recent weight change [Lower Ext Edema] : no lower extremity edema [SOB on Exertion] : no shortness of breath during exertion [Abdominal Pain] : no abdominal pain [Vomiting] : no vomiting [Diarrhea] : no diarrhea [Skin Rash] : no skin rash [FreeTextEntry7] : epigastric pain, poor appetite

## 2020-09-25 ENCOUNTER — RESULT REVIEW (OUTPATIENT)
Age: 65
End: 2020-09-25

## 2020-09-25 ENCOUNTER — OUTPATIENT (OUTPATIENT)
Dept: OUTPATIENT SERVICES | Facility: HOSPITAL | Age: 65
LOS: 1 days | End: 2020-09-25
Payer: MEDICARE

## 2020-09-25 ENCOUNTER — APPOINTMENT (OUTPATIENT)
Dept: CT IMAGING | Facility: IMAGING CENTER | Age: 65
End: 2020-09-25
Payer: MEDICARE

## 2020-09-25 DIAGNOSIS — C20 MALIGNANT NEOPLASM OF RECTUM: ICD-10-CM

## 2020-09-25 DIAGNOSIS — Z90.49 ACQUIRED ABSENCE OF OTHER SPECIFIED PARTS OF DIGESTIVE TRACT: Chronic | ICD-10-CM

## 2020-09-25 DIAGNOSIS — Z98.890 OTHER SPECIFIED POSTPROCEDURAL STATES: Chronic | ICD-10-CM

## 2020-09-25 PROCEDURE — 74177 CT ABD & PELVIS W/CONTRAST: CPT

## 2020-09-25 PROCEDURE — 71260 CT THORAX DX C+: CPT

## 2020-09-25 PROCEDURE — 74177 CT ABD & PELVIS W/CONTRAST: CPT | Mod: 26

## 2020-09-25 PROCEDURE — 71260 CT THORAX DX C+: CPT | Mod: 26

## 2020-09-29 ENCOUNTER — APPOINTMENT (OUTPATIENT)
Dept: HEMATOLOGY ONCOLOGY | Facility: CLINIC | Age: 65
End: 2020-09-29
Payer: MEDICARE

## 2020-09-29 PROCEDURE — 99441: CPT

## 2020-11-09 ENCOUNTER — NON-APPOINTMENT (OUTPATIENT)
Age: 65
End: 2020-11-09

## 2020-11-09 ENCOUNTER — APPOINTMENT (OUTPATIENT)
Dept: HEMATOLOGY ONCOLOGY | Facility: CLINIC | Age: 65
End: 2020-11-09
Payer: MEDICARE

## 2020-11-09 PROCEDURE — 99441: CPT

## 2020-11-17 ENCOUNTER — APPOINTMENT (OUTPATIENT)
Dept: GASTROENTEROLOGY | Facility: CLINIC | Age: 65
End: 2020-11-17
Payer: MEDICARE

## 2020-11-17 VITALS
TEMPERATURE: 98.2 F | HEIGHT: 62 IN | BODY MASS INDEX: 19.51 KG/M2 | WEIGHT: 106 LBS | DIASTOLIC BLOOD PRESSURE: 60 MMHG | HEART RATE: 75 BPM | OXYGEN SATURATION: 96 % | RESPIRATION RATE: 16 BRPM | SYSTOLIC BLOOD PRESSURE: 110 MMHG

## 2020-11-17 DIAGNOSIS — R10.13 EPIGASTRIC PAIN: ICD-10-CM

## 2020-11-17 PROCEDURE — 99072 ADDL SUPL MATRL&STAF TM PHE: CPT

## 2020-11-17 PROCEDURE — 99204 OFFICE O/P NEW MOD 45 MIN: CPT

## 2020-11-17 NOTE — REASON FOR VISIT
[Consultation] : a consultation visit [Family Member] : family member [FreeTextEntry1] : abdominal pain

## 2020-11-17 NOTE — HISTORY OF PRESENT ILLNESS
[de-identified] : dictation inactive\par \par 65 yr female, complex surgical history, rectal ca resection, sbo with bowel resection, with chronic epigastric and generalized abdominal pains\par Not clearly related to PO or BM\par Not positiional\par Recent CT unremarkable\par EGD by primary GI essentially normal\par Has not yet tried the daily PPI recommended\par Denies nsaid or asa\par Weight stable

## 2020-11-17 NOTE — PHYSICAL EXAM
[General Appearance - Alert] : alert [General Appearance - In No Acute Distress] : in no acute distress [Sclera] : the sclera and conjunctiva were normal [PERRL With Normal Accommodation] : pupils were equal in size, round, and reactive to light [Extraocular Movements] : extraocular movements were intact [Outer Ear] : the ears and nose were normal in appearance [Oropharynx] : the oropharynx was normal [Neck Appearance] : the appearance of the neck was normal [Neck Cervical Mass (___cm)] : no neck mass was observed [Jugular Venous Distention Increased] : there was no jugular-venous distention [Thyroid Diffuse Enlargement] : the thyroid was not enlarged [Thyroid Nodule] : there were no palpable thyroid nodules [Auscultation Breath Sounds / Voice Sounds] : lungs were clear to auscultation bilaterally [Heart Rate And Rhythm] : heart rate was normal and rhythm regular [Heart Sounds] : normal S1 and S2 [Heart Sounds Gallop] : no gallops [Murmurs] : no murmurs [Heart Sounds Pericardial Friction Rub] : no pericardial rub [Edema] : there was no peripheral edema [Bowel Sounds] : normal bowel sounds [Abdomen Soft] : soft [FreeTextEntry1] : extensive scars [Cervical Lymph Nodes Enlarged Posterior Bilaterally] : posterior cervical [Cervical Lymph Nodes Enlarged Anterior Bilaterally] : anterior cervical [Supraclavicular Lymph Nodes Enlarged Bilaterally] : supraclavicular [Axillary Lymph Nodes Enlarged Bilaterally] : axillary [Femoral Lymph Nodes Enlarged Bilaterally] : femoral [Inguinal Lymph Nodes Enlarged Bilaterally] : inguinal [No CVA Tenderness] : no ~M costovertebral angle tenderness [No Spinal Tenderness] : no spinal tenderness [Abnormal Walk] : normal gait [Nail Clubbing] : no clubbing  or cyanosis of the fingernails [Musculoskeletal - Swelling] : no joint swelling seen [Motor Tone] : muscle strength and tone were normal [Skin Color & Pigmentation] : normal skin color and pigmentation [Skin Turgor] : normal skin turgor [] : no rash [Oriented To Time, Place, And Person] : oriented to person, place, and time [Impaired Insight] : insight and judgment were intact [Affect] : the affect was normal

## 2020-11-17 NOTE — ASSESSMENT
[FreeTextEntry1] : Abdominal pain\par Epigastric then radiating to become more generalized\par Suspect neuropathic pain\par \par Plan\par Amitriptyline 10mg QHS\par Risks benefits alternatives reviewed with patient and son,particularly somnolence\par Both understand and agree\par PPI daily \par OV 4 weeks

## 2021-02-16 ENCOUNTER — OUTPATIENT (OUTPATIENT)
Dept: OUTPATIENT SERVICES | Facility: HOSPITAL | Age: 66
LOS: 1 days | Discharge: HOME | End: 2021-02-16

## 2021-02-16 DIAGNOSIS — Z90.49 ACQUIRED ABSENCE OF OTHER SPECIFIED PARTS OF DIGESTIVE TRACT: Chronic | ICD-10-CM

## 2021-02-16 DIAGNOSIS — C20 MALIGNANT NEOPLASM OF RECTUM: ICD-10-CM

## 2021-02-16 DIAGNOSIS — Z98.890 OTHER SPECIFIED POSTPROCEDURAL STATES: Chronic | ICD-10-CM

## 2021-02-17 ENCOUNTER — RESULT REVIEW (OUTPATIENT)
Age: 66
End: 2021-02-17

## 2021-02-17 ENCOUNTER — APPOINTMENT (OUTPATIENT)
Dept: HEMATOLOGY ONCOLOGY | Facility: CLINIC | Age: 66
End: 2021-02-17
Payer: MEDICARE

## 2021-02-17 VITALS
RESPIRATION RATE: 14 BRPM | BODY MASS INDEX: 19.56 KG/M2 | SYSTOLIC BLOOD PRESSURE: 134 MMHG | DIASTOLIC BLOOD PRESSURE: 79 MMHG | OXYGEN SATURATION: 98 % | WEIGHT: 106.92 LBS | HEART RATE: 62 BPM | TEMPERATURE: 97.3 F

## 2021-02-17 LAB
BASOPHILS # BLD AUTO: 0.02 K/UL — SIGNIFICANT CHANGE UP (ref 0–0.2)
BASOPHILS NFR BLD AUTO: 0.6 % — SIGNIFICANT CHANGE UP (ref 0–2)
EOSINOPHIL # BLD AUTO: 0.07 K/UL — SIGNIFICANT CHANGE UP (ref 0–0.5)
EOSINOPHIL NFR BLD AUTO: 2 % — SIGNIFICANT CHANGE UP (ref 0–6)
HCT VFR BLD CALC: 39.5 % — SIGNIFICANT CHANGE UP (ref 34.5–45)
HGB BLD-MCNC: 12.9 G/DL — SIGNIFICANT CHANGE UP (ref 11.5–15.5)
IMM GRANULOCYTES NFR BLD AUTO: 0.3 % — SIGNIFICANT CHANGE UP (ref 0–1.5)
LYMPHOCYTES # BLD AUTO: 0.87 K/UL — LOW (ref 1–3.3)
LYMPHOCYTES # BLD AUTO: 25.4 % — SIGNIFICANT CHANGE UP (ref 13–44)
MCHC RBC-ENTMCNC: 31.2 PG — SIGNIFICANT CHANGE UP (ref 27–34)
MCHC RBC-ENTMCNC: 32.7 G/DL — SIGNIFICANT CHANGE UP (ref 32–36)
MCV RBC AUTO: 95.6 FL — SIGNIFICANT CHANGE UP (ref 80–100)
MONOCYTES # BLD AUTO: 0.22 K/UL — SIGNIFICANT CHANGE UP (ref 0–0.9)
MONOCYTES NFR BLD AUTO: 6.4 % — SIGNIFICANT CHANGE UP (ref 2–14)
NEUTROPHILS # BLD AUTO: 2.23 K/UL — SIGNIFICANT CHANGE UP (ref 1.8–7.4)
NEUTROPHILS NFR BLD AUTO: 65.3 % — SIGNIFICANT CHANGE UP (ref 43–77)
NRBC # BLD: 0 /100 WBCS — SIGNIFICANT CHANGE UP (ref 0–0)
PLATELET # BLD AUTO: 203 K/UL — SIGNIFICANT CHANGE UP (ref 150–400)
RBC # BLD: 4.13 M/UL — SIGNIFICANT CHANGE UP (ref 3.8–5.2)
RBC # FLD: 12.4 % — SIGNIFICANT CHANGE UP (ref 10.3–14.5)
WBC # BLD: 3.42 K/UL — LOW (ref 3.8–10.5)
WBC # FLD AUTO: 3.42 K/UL — LOW (ref 3.8–10.5)

## 2021-02-17 PROCEDURE — 99072 ADDL SUPL MATRL&STAF TM PHE: CPT

## 2021-02-17 PROCEDURE — 99214 OFFICE O/P EST MOD 30 MIN: CPT

## 2021-02-17 RX ORDER — AMITRIPTYLINE HYDROCHLORIDE 10 MG/1
10 TABLET, FILM COATED ORAL
Qty: 30 | Refills: 1 | Status: DISCONTINUED | COMMUNITY
Start: 2020-11-17 | End: 2021-02-17

## 2021-02-17 RX ORDER — ALENDRONATE SODIUM 70 MG/1
70 TABLET ORAL
Refills: 0 | Status: DISCONTINUED | COMMUNITY
Start: 2019-05-22 | End: 2021-02-17

## 2021-02-17 NOTE — PHYSICAL EXAM
[Fully active, able to carry on all pre-disease performance without restriction] : Status 0 - Fully active, able to carry on all pre-disease performance without restriction [Normal] : affect appropriate [de-identified] : neck supple  [de-identified] : anicteric  [de-identified] : speaking comfortably; no adventitious lung sounds

## 2021-02-17 NOTE — REVIEW OF SYSTEMS
[Anxiety] : anxiety [Negative] : Allergic/Immunologic [Fever] : no fever [Fatigue] : no fatigue [Recent Change In Weight] : ~T no recent weight change [Lower Ext Edema] : no lower extremity edema [SOB on Exertion] : no shortness of breath during exertion [Abdominal Pain] : no abdominal pain [Vomiting] : no vomiting [Diarrhea] : no diarrhea [Skin Rash] : no skin rash [FreeTextEntry7] : epigastric pain

## 2021-02-17 NOTE — HISTORY OF PRESENT ILLNESS
[Disease: _____________________] : Disease: [unfilled] [T: ___] : T[unfilled] [N: ___] : N[unfilled] [M: ___] : M[unfilled] [AJCC Stage: ____] : AJCC Stage: [unfilled] [de-identified] : 62-year-old [place] female with PMH of HTN who had a Colonoscopy March 31,2017 due to lower abdominal pain and bloody BMs by dr Adis Fairbanks.  She was found to have a 3 cm infiltrating mass 7 cm from the anal verge with central ulceration and biopsy confirmed moderately differentiated invasive adenocarcinoma.  A CT a/p on 3/24/17 was negative for mets.  Her MRI pelvis from 3/31 showed some possible posterior rectal wall thickening as well as one enlarged perirectal lymph node and uterine fibroids but no obvious rectal mass was noted.  Patient consulted Dr Moore 4/5 and plan was for low anterior resection of rectum after chemoradiation.  Patient presented to us 4/11/2017 to plan further treatment.  She was started on RT/Xeloda 5/15/17 (around the same time lost her  in an accident) and planned to finish 6/22/17 then had resection in 8/17 \par \par Pt did not return for post surgery chemotherapy till January 2018 so given significant delay, adjuvant chemotherapy was not received.  \par \par She underwent loop ileostomy 8/2017 and then reversal was in 12/2017 by Dr Moore.  She was followed on surveillance thereafter. She had CT c/a/p done 1/16/19 which showed stable findings.  She was admitted to [facility] for SBO on 12/7/18 but that resolved with conservative management.  \par \par she was admitted to [facility] on 10/16/19 for closed loop SBO and underwent emergency exploratory laparotomy, IRLANDA and release of internal hernia, small bowel resection, mesenteric lymph node resection of which the final pathology was benign (segment of small bowel with serositis and serosal fibrosis and adhesions), negative margins ,multiple reactive lymph nodes.  She was re-admitted to [facility] on 10/27/19 with abdominal pain and found to have a pre-sacral abdominal collection and underwent IR  guided drainage on 10/28/19 which she tolerated well and was discharged home with outpatient follow up with Dr Moore. \par \par She had colonoscopy 3/2019 with Dr Fairbanks which was ok and is to repeat a sigmoidoscopy in 03/2020\par  [de-identified] : moderately differentiated invasive adenocarcinoma [de-identified] : DOMINIC\par  [FreeTextEntry1] : RT/Xeloda 5/15-6/22/17 --> loop ileostomy 8/2017 and then reversal was in 12/2017 [de-identified] : Shanita comes in for follow up while 3 yrs into surveillance and feels well overall.  She continues to have intermittent abdominal discomfort and takes Omeprazole prn which helps.  She was prescribed Amitriptyline by GI but she did not take it due to side effects listed.  She denies any fatigue, CP, palpitations, WOLF, n/v/d/c, LE edema, dizziness, weight changes.  She is scheduled for Colonoscopy March 1st with Dr Vance Velasquez.  \par  \par \par \par

## 2021-02-19 LAB
25(OH)D3 SERPL-MCNC: 56.3 NG/ML
ALBUMIN SERPL ELPH-MCNC: 4.5 G/DL
ALP BLD-CCNC: 55 U/L
ALT SERPL-CCNC: 37 U/L
ANION GAP SERPL CALC-SCNC: 12 MMOL/L
AST SERPL-CCNC: 31 U/L
BILIRUB SERPL-MCNC: 0.4 MG/DL
BUN SERPL-MCNC: 22 MG/DL
CALCIUM SERPL-MCNC: 9.8 MG/DL
CEA SERPL-MCNC: 2.2 NG/ML
CHLORIDE SERPL-SCNC: 98 MMOL/L
CO2 SERPL-SCNC: 27 MMOL/L
CREAT SERPL-MCNC: 0.71 MG/DL
GLUCOSE SERPL-MCNC: 97 MG/DL
POTASSIUM SERPL-SCNC: 5.5 MMOL/L
PROT SERPL-MCNC: 7.4 G/DL
SODIUM SERPL-SCNC: 137 MMOL/L

## 2021-03-15 ENCOUNTER — APPOINTMENT (OUTPATIENT)
Dept: SURGICAL ONCOLOGY | Facility: CLINIC | Age: 66
End: 2021-03-15
Payer: MEDICARE

## 2021-03-15 VITALS
DIASTOLIC BLOOD PRESSURE: 78 MMHG | TEMPERATURE: 97.6 F | WEIGHT: 105 LBS | HEIGHT: 62 IN | SYSTOLIC BLOOD PRESSURE: 134 MMHG | OXYGEN SATURATION: 98 % | HEART RATE: 57 BPM | RESPIRATION RATE: 14 BRPM | BODY MASS INDEX: 19.32 KG/M2

## 2021-03-15 PROCEDURE — 99072 ADDL SUPL MATRL&STAF TM PHE: CPT

## 2021-03-15 PROCEDURE — 99214 OFFICE O/P EST MOD 30 MIN: CPT

## 2021-03-15 NOTE — ASSESSMENT
[FreeTextEntry1] : Imp:\par JENNI - hx of Rectal cancer\par \par \par Plan:\par Bloodwork by Dr. Jones \par Colonoscopy as per Dr. Velasquez  ( negative in march, 2021, repeat colonoscopy 3/2023 )\par CT C/A/P 9/2021 (as per Med-onc)\par RTO q6 months

## 2021-03-15 NOTE — HISTORY OF PRESENT ILLNESS
[de-identified] : Ms Medeiros is a 66 y.o. female who presents for a 6 month follow up visit.  \par \par Hx rectal adenocarcinoma \par She began neoadjuvant chemoradiation on 5/15/17 under the care of Dr. Jones and Dr. Booker which concluded on 6/22/17.\par \par She is s/p LAR with loop ileostomy on 8/4/17. Final pathology showed residual adenocarcinoma of the rectum (0.9 cm). 13 lymph nodes and margins were negative though tumor deposits were present. Stage T2N1c. She was re admitted on 8/31/17 with an SBO which resolved with NGT decompression. She is now post/op small bowel resection to close loop ileostomy with lysis of adhesions for release of internal hernia on 12/1/17.\par \par She was hospitalized at [facility] from 12/7-12/9/18 with abdominal pain/nausea.  CT A/P obtained showed SBO with possible transition point in the midpelvis. On the day after admission she passed multiple small bowel movements and continued to pass flatus and was D/C home. \par \par She is s/p rigid sigmoidoscopy and rectal dilatation for rectal stricture from low anterior anastomosis on 1/9/19. \par \par CT C/A/P 1/16/19 -Stable exam. No evidence of metastatic disease. \par \par She presented to [facility] ER on 10/16/19 after 2 days of worsening abdominal pain, nausea w/o vomiting.  CT A/P obtained in the ED showed closed loop SBO with 2 transition points.  She went emergency to the OR on 10/16/19 for an exploratory laparotomy, IRLANDA and release of internal hernia, small bowel resection, mesenteric lymph node resection.   Final pathology was benign (segment of small bowel with serositis and serosal fibrosis and adhesions), negative margins ,multiple reactive lymph nodes. \par \par She was re-admitted to [facility] on 10/27/19 with c/o generalized abdominal pain and found on CT to have a pre-sacral abdominal collection.  She was admitted and made NPO.  She went to IR on 10/28 for drainage.  She tolerated the procedure well.  She was discharged home on 10/30/19. \par \par CT C/A/P 1/6/2020- Resolution of presacral collection with residual presacral soft tissue thickening . No recurrent or metastatic disease is identified. \par \par Underwent a colonoscopy by Dr. Fairbanks March 2020 which was unremarkable with no evidence of recurrence.\par \par CT C/A/P 9/25/2020: A few less than 5 mm pulmonary nodules are stable. Stable presacral thickening. \par \par Blood work 2/17/2021: As per Dr. Jones: CEA (2.2 ng/mL) LFTs WNL\par \par She changed GI care to Dr. Vance Velasquez.\par Endoscopy 3/1/2021- Chronic gastritis without bleeding\par Colonoscopy 3/1/2021- Well healed surgical anastomosis, no local recurrence, diminutive flat polyp of size 5 mm at distance 20cm (polypectomy done), \par \par Pt. is with c/o intermittent abdominal discomfort.  On Omeprazole daily.  Denies nausea, vomiting, changes in appetite or bowel habits. Denies BRBPR.  \par \par Internist: Dr. Trevor Howell.

## 2021-03-15 NOTE — PHYSICAL EXAM
[Normal] : supple, no neck mass and thyroid not enlarged [Normal Supraclavicular Lymph Nodes] : normal supraclavicular lymph nodes [Normal Groin Lymph Nodes] : normal groin lymph nodes [Normal] : oriented to person, place and time, with appropriate affect [de-identified] : Midline scar but no masses

## 2021-03-19 NOTE — BRIEF OPERATIVE NOTE - DISPOSITION
What Is The Reason For Today's Visit?: Full Body Skin Examination
What Is The Reason For Today's Visit? (Being Monitored For X): concerning skin lesions on an annual basis
How Severe Are Your Spot(S)?: moderate
PACU to floor

## 2021-06-23 NOTE — DISCHARGE NOTE ADULT - HAS THE PATIENT RECEIVED THE INFLUENZA VACCINE DURING THIS VISIT
Physical Therapy Daily Progress Note    Subjective   Jazzy Negrete reports that she feels about the same, but she has noticed a little less numbness recently.     Today's Pain ratin/10    Objective   See Exercise, Manual, and Modality Logs for complete treatment.       Assessment/Plan     Pt was able to complete exercise in the clinic without an exacerbation of symptoms. Will cont to progress as indicated.     Progress per Plan of Care and Progress strengthening /stabilization /functional activity           Manual Therapy:         mins  98148;  Therapeutic Exercise:    16     mins  96155;     Neuromuscular Edita:    30    mins  30402;    Therapeutic Activity:          mins  52582;     Gait Training:           mins  00221;     Ultrasound:          mins  81611;    Electrical Stimulation:         mins  63050 ( );  E-Stim Attended:         mins  94596  Iontophoresis          mins 91023   Traction          mins  99191  Fluidotherapy          mins  13101  Dry Needling          mins self-pay - No Charge  Paraffin          mins  33136    Timed Treatment:   46   mins   Total Treatment:     46   mins    Henrique Wiggins, PT, DPT, OCS, Cert. DN  Physical Therapist   No

## 2021-08-06 ENCOUNTER — OUTPATIENT (OUTPATIENT)
Dept: OUTPATIENT SERVICES | Facility: HOSPITAL | Age: 66
LOS: 1 days | Discharge: HOME | End: 2021-08-06

## 2021-08-06 DIAGNOSIS — Z90.49 ACQUIRED ABSENCE OF OTHER SPECIFIED PARTS OF DIGESTIVE TRACT: Chronic | ICD-10-CM

## 2021-08-06 DIAGNOSIS — Z98.890 OTHER SPECIFIED POSTPROCEDURAL STATES: Chronic | ICD-10-CM

## 2021-08-06 DIAGNOSIS — C20 MALIGNANT NEOPLASM OF RECTUM: ICD-10-CM

## 2021-08-09 ENCOUNTER — RESULT REVIEW (OUTPATIENT)
Age: 66
End: 2021-08-09

## 2021-08-09 ENCOUNTER — APPOINTMENT (OUTPATIENT)
Dept: HEMATOLOGY ONCOLOGY | Facility: CLINIC | Age: 66
End: 2021-08-09
Payer: MEDICARE

## 2021-08-09 VITALS
HEART RATE: 53 BPM | OXYGEN SATURATION: 99 % | BODY MASS INDEX: 19.94 KG/M2 | TEMPERATURE: 98.1 F | DIASTOLIC BLOOD PRESSURE: 75 MMHG | SYSTOLIC BLOOD PRESSURE: 151 MMHG | RESPIRATION RATE: 14 BRPM | WEIGHT: 109 LBS

## 2021-08-09 VITALS
OXYGEN SATURATION: 99 % | DIASTOLIC BLOOD PRESSURE: 78 MMHG | HEART RATE: 56 BPM | SYSTOLIC BLOOD PRESSURE: 145 MMHG | RESPIRATION RATE: 14 BRPM | TEMPERATURE: 98 F

## 2021-08-09 LAB
BASOPHILS # BLD AUTO: 0.03 K/UL — SIGNIFICANT CHANGE UP (ref 0–0.2)
BASOPHILS NFR BLD AUTO: 0.9 % — SIGNIFICANT CHANGE UP (ref 0–2)
CEA SERPL-MCNC: 2.1 NG/ML
EOSINOPHIL # BLD AUTO: 0.07 K/UL — SIGNIFICANT CHANGE UP (ref 0–0.5)
EOSINOPHIL NFR BLD AUTO: 2.1 % — SIGNIFICANT CHANGE UP (ref 0–6)
HCT VFR BLD CALC: 40.8 % — SIGNIFICANT CHANGE UP (ref 34.5–45)
HGB BLD-MCNC: 13 G/DL — SIGNIFICANT CHANGE UP (ref 11.5–15.5)
IMM GRANULOCYTES NFR BLD AUTO: 0.3 % — SIGNIFICANT CHANGE UP (ref 0–1.5)
LYMPHOCYTES # BLD AUTO: 1.05 K/UL — SIGNIFICANT CHANGE UP (ref 1–3.3)
LYMPHOCYTES # BLD AUTO: 31.8 % — SIGNIFICANT CHANGE UP (ref 13–44)
MCHC RBC-ENTMCNC: 31 PG — SIGNIFICANT CHANGE UP (ref 27–34)
MCHC RBC-ENTMCNC: 31.9 G/DL — LOW (ref 32–36)
MCV RBC AUTO: 97.1 FL — SIGNIFICANT CHANGE UP (ref 80–100)
MONOCYTES # BLD AUTO: 0.32 K/UL — SIGNIFICANT CHANGE UP (ref 0–0.9)
MONOCYTES NFR BLD AUTO: 9.7 % — SIGNIFICANT CHANGE UP (ref 2–14)
NEUTROPHILS # BLD AUTO: 1.82 K/UL — SIGNIFICANT CHANGE UP (ref 1.8–7.4)
NEUTROPHILS NFR BLD AUTO: 55.2 % — SIGNIFICANT CHANGE UP (ref 43–77)
NRBC # BLD: 0 /100 WBCS — SIGNIFICANT CHANGE UP (ref 0–0)
PLATELET # BLD AUTO: 221 K/UL — SIGNIFICANT CHANGE UP (ref 150–400)
RBC # BLD: 4.2 M/UL — SIGNIFICANT CHANGE UP (ref 3.8–5.2)
RBC # FLD: 13.3 % — SIGNIFICANT CHANGE UP (ref 10.3–14.5)
WBC # BLD: 3.3 K/UL — LOW (ref 3.8–10.5)
WBC # FLD AUTO: 3.3 K/UL — LOW (ref 3.8–10.5)

## 2021-08-09 PROCEDURE — 99214 OFFICE O/P EST MOD 30 MIN: CPT

## 2021-08-10 NOTE — HISTORY OF PRESENT ILLNESS
[Disease: _____________________] : Disease: [unfilled] [T: ___] : T[unfilled] [N: ___] : N[unfilled] [M: ___] : M[unfilled] [AJCC Stage: ____] : AJCC Stage: [unfilled] [de-identified] : 62-year-old [place] female with PMH of HTN who had a Colonoscopy March 31,2017 due to lower abdominal pain and bloody BMs by dr Adis Fairbanks.  She was found to have a 3 cm infiltrating mass 7 cm from the anal verge with central ulceration and biopsy confirmed moderately differentiated invasive adenocarcinoma.  A CT a/p on 3/24/17 was negative for mets.  Her MRI pelvis from 3/31 showed some possible posterior rectal wall thickening as well as one enlarged perirectal lymph node and uterine fibroids but no obvious rectal mass was noted.  Patient consulted Dr Moore 4/5 and plan was for low anterior resection of rectum after chemoradiation.  Patient presented to us 4/11/2017 to plan further treatment.  She was started on RT/Xeloda 5/15/17 (around the same time lost her  in an accident) and planned to finish 6/22/17 then had resection in 8/17 \par \par Pt did not return for post surgery chemotherapy till January 2018 so given significant delay, adjuvant chemotherapy was not received.  \par \par She underwent loop ileostomy 8/2017 and then reversal was in 12/2017 by Dr Moore.  She was followed on surveillance thereafter. She had CT c/a/p done 1/16/19 which showed stable findings.  She was admitted to [facility] for SBO on 12/7/18 but that resolved with conservative management.  \par \par she was admitted to [facility] on 10/16/19 for closed loop SBO and underwent emergency exploratory laparotomy, IRLANDA and release of internal hernia, small bowel resection, mesenteric lymph node resection of which the final pathology was benign (segment of small bowel with serositis and serosal fibrosis and adhesions), negative margins ,multiple reactive lymph nodes.  She was re-admitted to [facility] on 10/27/19 with abdominal pain and found to have a pre-sacral abdominal collection and underwent IR  guided drainage on 10/28/19 which she tolerated well and was discharged home with outpatient follow up with Dr Moore. \par \par She had colonoscopy 3/2019 with Dr Fairbanks which was ok and is to repeat a sigmoidoscopy in 03/2020\par  [de-identified] : moderately differentiated invasive adenocarcinoma [de-identified] : DOMINIC\par  [FreeTextEntry1] : RT/Xeloda 5/15-6/22/17 --> loop ileostomy 8/2017 and then reversal was in 12/2017 [de-identified] : no acute complaints\par doing well overall \par has intermittent vague abdominal discomfort but hasn't sought GI follow up recently

## 2021-08-10 NOTE — PHYSICAL EXAM
[Fully active, able to carry on all pre-disease performance without restriction] : Status 0 - Fully active, able to carry on all pre-disease performance without restriction [Normal] : affect appropriate [de-identified] : anicteric  [de-identified] : normal respiratory effort, no audible wheeze [de-identified] : reg rate

## 2021-09-02 ENCOUNTER — APPOINTMENT (OUTPATIENT)
Dept: SURGICAL ONCOLOGY | Facility: CLINIC | Age: 66
End: 2021-09-02
Payer: MEDICARE

## 2021-09-02 VITALS
SYSTOLIC BLOOD PRESSURE: 129 MMHG | OXYGEN SATURATION: 97 % | HEIGHT: 62 IN | DIASTOLIC BLOOD PRESSURE: 73 MMHG | HEART RATE: 58 BPM | RESPIRATION RATE: 16 BRPM | WEIGHT: 105 LBS | BODY MASS INDEX: 19.32 KG/M2

## 2021-09-02 PROCEDURE — 99214 OFFICE O/P EST MOD 30 MIN: CPT

## 2021-09-02 NOTE — ASSESSMENT
[FreeTextEntry1] : Imp:\par JENNI - hx of Rectal cancer\par \par \par Plan:\par Bloodwork by Dr. Jones \par Colonoscopy as per Dr. Velasquez  ( negative in march, 2021, repeat colonoscopy 3/2023 )\par CT C/A/P 9/2021 (as per Med-onc)\par RTO q6 months
Negative

## 2021-09-02 NOTE — PHYSICAL EXAM
[Normal] : supple, no neck mass and thyroid not enlarged [Normal Supraclavicular Lymph Nodes] : normal supraclavicular lymph nodes [Normal Groin Lymph Nodes] : normal groin lymph nodes [Normal] : oriented to person, place and time, with appropriate affect [de-identified] : midline inscision but no masses or tenderness

## 2021-09-02 NOTE — HISTORY OF PRESENT ILLNESS
[de-identified] : Ms Medeiros is a 66 y.o. female who presents for a 6 month follow up visit.  \par \par Hx rectal adenocarcinoma \par She began neoadjuvant chemoradiation on 5/15/17 under the care of Dr. Jones and Dr. Booker which concluded on 6/22/17.\par \par She is s/p LAR with loop ileostomy on 8/4/17. Final pathology showed residual adenocarcinoma of the rectum (0.9 cm). 13 lymph nodes and margins were negative though tumor deposits were present. Stage T2N1c. She was re admitted on 8/31/17 with an SBO which resolved with NGT decompression. She is now post/op small bowel resection to close loop ileostomy with lysis of adhesions for release of internal hernia on 12/1/17.\par \par She was hospitalized at [facility] from 12/7-12/9/18 with abdominal pain/nausea.  CT A/P obtained showed SBO with possible transition point in the midpelvis. On the day after admission she passed multiple small bowel movements and continued to pass flatus and was D/C home. \par \par She is s/p rigid sigmoidoscopy and rectal dilatation for rectal stricture from low anterior anastomosis on 1/9/19. \par \par CT C/A/P 1/16/19 -Stable exam. No evidence of metastatic disease. \par \par She presented to [facility] ER on 10/16/19 after 2 days of worsening abdominal pain, nausea w/o vomiting.  CT A/P obtained in the ED showed closed loop SBO with 2 transition points.  She went emergency to the OR on 10/16/19 for an exploratory laparotomy, IRLANDA and release of internal hernia, small bowel resection, mesenteric lymph node resection.   Final pathology was benign (segment of small bowel with serositis and serosal fibrosis and adhesions), negative margins ,multiple reactive lymph nodes. \par \par She was re-admitted to [facility] on 10/27/19 with c/o generalized abdominal pain and found on CT to have a pre-sacral abdominal collection.  She was admitted and made NPO.  She went to IR on 10/28 for drainage.  She tolerated the procedure well.  She was discharged home on 10/30/19. \par \par CT C/A/P 1/6/2020- Resolution of presacral collection with residual presacral soft tissue thickening . No recurrent or metastatic disease is identified. \par \par Underwent a colonoscopy by Dr. Fairbanks March 2020 which was unremarkable with no evidence of recurrence.\par \par CT C/A/P 9/25/2020: A few less than 5 mm pulmonary nodules are stable. Stable presacral thickening. \par \par Blood work 2/17/2021: As per Dr. Jones: CEA (2.2 ng/mL) LFTs WNL\par \par She changed GI care to Dr. Vance Velasquez.\par Endoscopy 3/1/2021- Chronic gastritis without bleeding\par Colonoscopy 3/1/2021- Well healed surgical anastomosis, no local recurrence, diminutive flat polyp of size 5 mm at distance 20cm (polypectomy done), \par \par Pt. is with c/o intermittent abdominal discomfort.  On Omeprazole daily.  Denies nausea, vomiting, changes in appetite or bowel habits. Denies BRBPR.  \par \par Bloodwork as per Dr. Jones: CEA 2.1 ng/ml (8/9/21) \par \par Internist: Dr. Trevor Howell.

## 2021-09-20 ENCOUNTER — APPOINTMENT (OUTPATIENT)
Dept: CT IMAGING | Facility: IMAGING CENTER | Age: 66
End: 2021-09-20
Payer: MEDICARE

## 2021-09-20 ENCOUNTER — OUTPATIENT (OUTPATIENT)
Dept: OUTPATIENT SERVICES | Facility: HOSPITAL | Age: 66
LOS: 1 days | End: 2021-09-20
Payer: MEDICARE

## 2021-09-20 DIAGNOSIS — Z98.890 OTHER SPECIFIED POSTPROCEDURAL STATES: Chronic | ICD-10-CM

## 2021-09-20 DIAGNOSIS — C20 MALIGNANT NEOPLASM OF RECTUM: ICD-10-CM

## 2021-09-20 DIAGNOSIS — Z90.49 ACQUIRED ABSENCE OF OTHER SPECIFIED PARTS OF DIGESTIVE TRACT: Chronic | ICD-10-CM

## 2021-09-20 PROCEDURE — 74177 CT ABD & PELVIS W/CONTRAST: CPT | Mod: 26

## 2021-09-20 PROCEDURE — 74177 CT ABD & PELVIS W/CONTRAST: CPT

## 2021-09-20 PROCEDURE — 82565 ASSAY OF CREATININE: CPT

## 2021-09-20 PROCEDURE — 71260 CT THORAX DX C+: CPT

## 2021-09-20 PROCEDURE — 71260 CT THORAX DX C+: CPT | Mod: 26

## 2021-09-22 ENCOUNTER — OUTPATIENT (OUTPATIENT)
Dept: OUTPATIENT SERVICES | Facility: HOSPITAL | Age: 66
LOS: 1 days | Discharge: HOME | End: 2021-09-22

## 2021-09-22 DIAGNOSIS — Z90.49 ACQUIRED ABSENCE OF OTHER SPECIFIED PARTS OF DIGESTIVE TRACT: Chronic | ICD-10-CM

## 2021-09-22 DIAGNOSIS — Z98.890 OTHER SPECIFIED POSTPROCEDURAL STATES: Chronic | ICD-10-CM

## 2021-09-22 DIAGNOSIS — C20 MALIGNANT NEOPLASM OF RECTUM: ICD-10-CM

## 2021-09-27 ENCOUNTER — APPOINTMENT (OUTPATIENT)
Dept: HEMATOLOGY ONCOLOGY | Facility: CLINIC | Age: 66
End: 2021-09-27
Payer: MEDICARE

## 2021-09-27 DIAGNOSIS — C20 MALIGNANT NEOPLASM OF RECTUM: ICD-10-CM

## 2021-09-27 DIAGNOSIS — R10.84 GENERALIZED ABDOMINAL PAIN: ICD-10-CM

## 2021-09-27 PROCEDURE — 99441: CPT

## 2021-09-29 PROBLEM — C20 RECTAL CANCER: Status: ACTIVE | Noted: 2017-04-05

## 2021-09-29 PROBLEM — R10.84 GENERALIZED ABDOMINAL PAIN: Status: RESOLVED | Noted: 2020-11-17 | Resolved: 2021-09-29

## 2021-09-29 NOTE — HISTORY OF PRESENT ILLNESS
[Home] : at home, [unfilled] , at the time of the visit. [Medical Office: ([facility])___] : at the medical office located in  [Verbal consent obtained from patient] : the patient, [unfilled] [Disease: _____________________] : Disease: [unfilled] [T: ___] : T[unfilled] [N: ___] : N[unfilled] [M: ___] : M[unfilled] [AJCC Stage: ____] : AJCC Stage: [unfilled] [de-identified] : 62-year-old Chinese female with PMH of HTN who had a Colonoscopy March 31,2017 due to lower abdominal pain and bloody BMs by dr Adis Fairbanks.  She was found to have a 3 cm infiltrating mass 7 cm from the anal verge with central ulceration and biopsy confirmed moderately differentiated invasive adenocarcinoma.  A CT a/p on 3/24/17 was negative for mets.  Her MRI pelvis from 3/31 showed some possible posterior rectal wall thickening as well as one enlarged perirectal lymph node and uterine fibroids but no obvious rectal mass was noted.  Patient consulted Dr Moore 4/5 and plan was for low anterior resection of rectum after chemoradiation.  Patient presented to us 4/11/2017 to plan further treatment.  She was started on RT/Xeloda 5/15/17 (around the same time lost her  in an accident) and planned to finish 6/22/17 then had resection in 8/17 \par \par Pt did not return for post surgery chemotherapy till January 2018 so given significant delay, adjuvant chemotherapy was not received.  \par \par She underwent loop ileostomy 8/2017 and then reversal was in 12/2017 by Dr Moore.  She was followed on surveillance thereafter. She had CT c/a/p done 1/16/19 which showed stable findings.  She was admitted to [facility] for SBO on 12/7/18 but that resolved with conservative management.  \par \par she was admitted to [facility] on 10/16/19 for closed loop SBO and underwent emergency exploratory laparotomy, IRLANDA and release of internal hernia, small bowel resection, mesenteric lymph node resection of which the final pathology was benign (segment of small bowel with serositis and serosal fibrosis and adhesions), negative margins ,multiple reactive lymph nodes.  She was re-admitted to [facility] on 10/27/19 with abdominal pain and found to have a pre-sacral abdominal collection and underwent IR  guided drainage on 10/28/19 which she tolerated well and was discharged home with outpatient follow up with Dr Moore. \par \par She had colonoscopy 3/2019 with Dr Fairbanks which was ok and is to repeat a sigmoidoscopy in 03/2020\par  [de-identified] : moderately differentiated invasive adenocarcinoma [de-identified] : [id number] [de-identified] : DOMINIC\par  [FreeTextEntry1] : RT/Xeloda 5/15-6/22/17 --> loop ileostomy 8/2017 and then reversal was in 12/2017 [de-identified] : no new complaints\par present via phone to review recent imaging , no new complaints

## 2021-09-29 NOTE — REVIEW OF SYSTEMS
[Negative] : Allergic/Immunologic [Fever] : no fever [Fatigue] : no fatigue [Recent Change In Weight] : ~T no recent weight change [Lower Ext Edema] : no lower extremity edema [SOB on Exertion] : no shortness of breath during exertion [Abdominal Pain] : no abdominal pain [Vomiting] : no vomiting [Diarrhea] : no diarrhea [Skin Rash] : no skin rash

## 2021-11-18 NOTE — ED PROVIDER NOTE - CPE EDP GASTRO NORM
AYR nasal gel for hydration and placing over the areas of irritation inside the nose. You can use Vaseline or Aquaphor over the outside skin if it is irritated. You can use an over-the-counter nasal saline spray for hydration as well.    Flonase is for nasal congestion. Aim the nozzle up and away from the septum when spraying in your nose. Use once daily.    Use a humidifier in your bedroom.    Nerdwax, is the topical stick to try on the pads of your glasses to prevent slipping.    Someone from scheduling should call you to schedule the VNG. If you know what your schedule looks like when they call you you can schedule it then, otherwise just let them know you will call back to schedule.    
- - -

## 2021-12-27 ENCOUNTER — APPOINTMENT (OUTPATIENT)
Dept: SURGICAL ONCOLOGY | Facility: CLINIC | Age: 66
End: 2021-12-27
Payer: MEDICARE

## 2021-12-27 VITALS
SYSTOLIC BLOOD PRESSURE: 128 MMHG | OXYGEN SATURATION: 97 % | HEIGHT: 62 IN | BODY MASS INDEX: 19.32 KG/M2 | RESPIRATION RATE: 61 BRPM | WEIGHT: 105 LBS | DIASTOLIC BLOOD PRESSURE: 71 MMHG

## 2021-12-27 PROCEDURE — 99214 OFFICE O/P EST MOD 30 MIN: CPT

## 2021-12-27 NOTE — HISTORY OF PRESENT ILLNESS
[de-identified] : Ms Medeiros is a 66 y.o. female who presents for a 6 month follow up visit.  \par \par Hx rectal adenocarcinoma \par She began neoadjuvant chemoradiation on 5/15/17 under the care of Dr. Jones and Dr. Booker which concluded on 6/22/17.\par \par She is s/p LAR with loop ileostomy on 8/4/17. Final pathology showed residual adenocarcinoma of the rectum (0.9 cm). 13 lymph nodes and margins were negative though tumor deposits were present. Stage T2N1c. She was re admitted on 8/31/17 with an SBO which resolved with NGT decompression. She is now post/op small bowel resection to close loop ileostomy with lysis of adhesions for release of internal hernia on 12/1/17.\par \par She was hospitalized at [facility] from 12/7-12/9/18 with abdominal pain/nausea.  CT A/P obtained showed SBO with possible transition point in the midpelvis. On the day after admission she passed multiple small bowel movements and continued to pass flatus and was D/C home. \par \par She is s/p rigid sigmoidoscopy and rectal dilatation for rectal stricture from low anterior anastomosis on 1/9/19. \par \par CT C/A/P 1/16/19 -Stable exam. No evidence of metastatic disease. \par \par She presented to [facility] ER on 10/16/19 after 2 days of worsening abdominal pain, nausea w/o vomiting.  CT A/P obtained in the ED showed closed loop SBO with 2 transition points.  She went emergency to the OR on 10/16/19 for an exploratory laparotomy, IRLANDA and release of internal hernia, small bowel resection, mesenteric lymph node resection.   Final pathology was benign (segment of small bowel with serositis and serosal fibrosis and adhesions), negative margins ,multiple reactive lymph nodes. \par \par She was re-admitted to [facility] on 10/27/19 with c/o generalized abdominal pain and found on CT to have a pre-sacral abdominal collection.  She was admitted and made NPO.  She went to IR on 10/28 for drainage.  She tolerated the procedure well.  She was discharged home on 10/30/19. \par \par CT C/A/P 1/6/2020- Resolution of presacral collection with residual presacral soft tissue thickening . No recurrent or metastatic disease is identified. \par \par Underwent a colonoscopy by Dr. Fairbanks March 2020 which was unremarkable with no evidence of recurrence.\par \par CT C/A/P 9/25/2020: A few less than 5 mm pulmonary nodules are stable. Stable presacral thickening. \par \par Blood work 2/17/2021: As per Dr. Jones: CEA (2.2 ng/mL) LFTs WNL\par \par She changed GI care to Dr. Vance Velasquez.\par Endoscopy 3/1/2021- Chronic gastritis without bleeding\par Colonoscopy 3/1/2021- Well healed surgical anastomosis, no local recurrence, diminutive flat polyp of size 5 mm at distance 20cm (polypectomy done), \par \par BW 8/2021: CEA (2.1)\par CT C/A/P 9/20/2021-  IMP: No evidence of metastatic disease. Interval enlargement of a right adnexal cyst for which correlation with pelvis sonogram is recommended. \par \par Did pt. f/u with Dr. Krystyna Suarez (GYN) in regards to growing adnexal cyst? \par \par Pt. is with c/o intermittent abdominal discomfort.  On Omeprazole daily.  Denies nausea, vomiting, changes in appetite or bowel habits. Denies BRBPR.  \par \par Bloodwork as per Dr. Jones: CEA 2.1 ng/ml (8/9/21) \par \par Internist: Dr. Trevor Howell.

## 2021-12-27 NOTE — ASSESSMENT
[FreeTextEntry1] : Imp:\par JENNI - hx of Rectal cancer\par BW 8/2021: CEA (2.1)\par CT C/A/P 9/20/2021-  IMP: No evidence of metastatic disease. Interval enlargement of a right adnexal cyst for which correlation with pelvis sonogram is recommended. \par \par \par Plan:\par Bloodwork by Dr. Jonse \par Colonoscopy as per Dr. Velasquez  ( negative in march, 2021, repeat colonoscopy 3/2023 )\par CT C/A/P 9/2022 (as per Med-onc)\par RTO q6 months

## 2021-12-30 ENCOUNTER — RESULT REVIEW (OUTPATIENT)
Age: 66
End: 2021-12-30

## 2021-12-30 ENCOUNTER — OUTPATIENT (OUTPATIENT)
Dept: OUTPATIENT SERVICES | Facility: HOSPITAL | Age: 66
LOS: 1 days | End: 2021-12-30
Payer: MEDICARE

## 2021-12-30 ENCOUNTER — APPOINTMENT (OUTPATIENT)
Dept: ULTRASOUND IMAGING | Facility: IMAGING CENTER | Age: 66
End: 2021-12-30
Payer: MEDICARE

## 2021-12-30 DIAGNOSIS — Z90.49 ACQUIRED ABSENCE OF OTHER SPECIFIED PARTS OF DIGESTIVE TRACT: Chronic | ICD-10-CM

## 2021-12-30 DIAGNOSIS — C20 MALIGNANT NEOPLASM OF RECTUM: ICD-10-CM

## 2021-12-30 DIAGNOSIS — Z98.890 OTHER SPECIFIED POSTPROCEDURAL STATES: Chronic | ICD-10-CM

## 2021-12-30 PROCEDURE — 76856 US EXAM PELVIC COMPLETE: CPT | Mod: 26

## 2021-12-30 PROCEDURE — 76830 TRANSVAGINAL US NON-OB: CPT | Mod: 26

## 2021-12-30 PROCEDURE — 76830 TRANSVAGINAL US NON-OB: CPT

## 2021-12-30 PROCEDURE — 76856 US EXAM PELVIC COMPLETE: CPT

## 2022-03-07 ENCOUNTER — APPOINTMENT (OUTPATIENT)
Dept: ULTRASOUND IMAGING | Facility: IMAGING CENTER | Age: 67
End: 2022-03-07
Payer: MEDICARE

## 2022-03-07 ENCOUNTER — OUTPATIENT (OUTPATIENT)
Dept: OUTPATIENT SERVICES | Facility: HOSPITAL | Age: 67
LOS: 1 days | End: 2022-03-07
Payer: MEDICARE

## 2022-03-07 DIAGNOSIS — Z90.49 ACQUIRED ABSENCE OF OTHER SPECIFIED PARTS OF DIGESTIVE TRACT: Chronic | ICD-10-CM

## 2022-03-07 DIAGNOSIS — N83.209 UNSPECIFIED OVARIAN CYST, UNSPECIFIED SIDE: ICD-10-CM

## 2022-03-07 DIAGNOSIS — Z98.890 OTHER SPECIFIED POSTPROCEDURAL STATES: Chronic | ICD-10-CM

## 2022-03-07 PROCEDURE — 76830 TRANSVAGINAL US NON-OB: CPT

## 2022-03-07 PROCEDURE — 76856 US EXAM PELVIC COMPLETE: CPT | Mod: 26

## 2022-03-07 PROCEDURE — 76856 US EXAM PELVIC COMPLETE: CPT

## 2022-03-07 PROCEDURE — 76830 TRANSVAGINAL US NON-OB: CPT | Mod: 26

## 2022-03-24 ENCOUNTER — OUTPATIENT (OUTPATIENT)
Dept: OUTPATIENT SERVICES | Facility: HOSPITAL | Age: 67
LOS: 1 days | Discharge: HOME | End: 2022-03-24

## 2022-03-24 DIAGNOSIS — Z98.890 OTHER SPECIFIED POSTPROCEDURAL STATES: Chronic | ICD-10-CM

## 2022-03-24 DIAGNOSIS — C20 MALIGNANT NEOPLASM OF RECTUM: ICD-10-CM

## 2022-03-24 DIAGNOSIS — Z90.49 ACQUIRED ABSENCE OF OTHER SPECIFIED PARTS OF DIGESTIVE TRACT: Chronic | ICD-10-CM

## 2022-03-28 ENCOUNTER — APPOINTMENT (OUTPATIENT)
Dept: HEMATOLOGY ONCOLOGY | Facility: CLINIC | Age: 67
End: 2022-03-28
Payer: MEDICARE

## 2022-03-28 ENCOUNTER — RESULT REVIEW (OUTPATIENT)
Age: 67
End: 2022-03-28

## 2022-03-28 VITALS
SYSTOLIC BLOOD PRESSURE: 145 MMHG | WEIGHT: 103.31 LBS | HEART RATE: 58 BPM | DIASTOLIC BLOOD PRESSURE: 73 MMHG | OXYGEN SATURATION: 100 % | TEMPERATURE: 97 F | BODY MASS INDEX: 18.9 KG/M2 | RESPIRATION RATE: 14 BRPM

## 2022-03-28 LAB
BASOPHILS # BLD AUTO: 0.02 K/UL — SIGNIFICANT CHANGE UP (ref 0–0.2)
BASOPHILS NFR BLD AUTO: 0.6 % — SIGNIFICANT CHANGE UP (ref 0–2)
CEA SERPL-MCNC: 1.6 NG/ML
EOSINOPHIL # BLD AUTO: 0.03 K/UL — SIGNIFICANT CHANGE UP (ref 0–0.5)
EOSINOPHIL NFR BLD AUTO: 1 % — SIGNIFICANT CHANGE UP (ref 0–6)
HCT VFR BLD CALC: 40.3 % — SIGNIFICANT CHANGE UP (ref 34.5–45)
HGB BLD-MCNC: 13 G/DL — SIGNIFICANT CHANGE UP (ref 11.5–15.5)
IMM GRANULOCYTES NFR BLD AUTO: 0 % — SIGNIFICANT CHANGE UP (ref 0–1.5)
LYMPHOCYTES # BLD AUTO: 1.18 K/UL — SIGNIFICANT CHANGE UP (ref 1–3.3)
LYMPHOCYTES # BLD AUTO: 37.8 % — SIGNIFICANT CHANGE UP (ref 13–44)
MCHC RBC-ENTMCNC: 31.3 PG — SIGNIFICANT CHANGE UP (ref 27–34)
MCHC RBC-ENTMCNC: 32.3 G/DL — SIGNIFICANT CHANGE UP (ref 32–36)
MCV RBC AUTO: 97.1 FL — SIGNIFICANT CHANGE UP (ref 80–100)
MONOCYTES # BLD AUTO: 0.31 K/UL — SIGNIFICANT CHANGE UP (ref 0–0.9)
MONOCYTES NFR BLD AUTO: 9.9 % — SIGNIFICANT CHANGE UP (ref 2–14)
NEUTROPHILS # BLD AUTO: 1.58 K/UL — LOW (ref 1.8–7.4)
NEUTROPHILS NFR BLD AUTO: 50.7 % — SIGNIFICANT CHANGE UP (ref 43–77)
NRBC # BLD: 0 /100 WBCS — SIGNIFICANT CHANGE UP (ref 0–0)
PLATELET # BLD AUTO: 203 K/UL — SIGNIFICANT CHANGE UP (ref 150–400)
RBC # BLD: 4.15 M/UL — SIGNIFICANT CHANGE UP (ref 3.8–5.2)
RBC # FLD: 12.7 % — SIGNIFICANT CHANGE UP (ref 10.3–14.5)
WBC # BLD: 3.12 K/UL — LOW (ref 3.8–10.5)
WBC # FLD AUTO: 3.12 K/UL — LOW (ref 3.8–10.5)

## 2022-03-28 PROCEDURE — 99214 OFFICE O/P EST MOD 30 MIN: CPT

## 2022-03-28 RX ORDER — DICYCLOMINE HYDROCHLORIDE 20 MG/1
20 TABLET ORAL
Qty: 30 | Refills: 0 | Status: COMPLETED | COMMUNITY
Start: 2021-12-21

## 2022-03-28 RX ORDER — RALOXIFENE HYDROCHLORIDE 60 MG/1
60 TABLET, FILM COATED ORAL
Qty: 90 | Refills: 0 | Status: ACTIVE | COMMUNITY
Start: 2022-03-21

## 2022-03-28 RX ORDER — AMLODIPINE BESYLATE 5 MG/1
5 TABLET ORAL
Qty: 90 | Refills: 0 | Status: COMPLETED | COMMUNITY
Start: 2021-09-09

## 2022-03-28 RX ORDER — OMEPRAZOLE 40 MG/1
40 CAPSULE, DELAYED RELEASE ORAL
Qty: 30 | Refills: 3 | Status: COMPLETED | COMMUNITY
Start: 2021-02-17 | End: 2022-03-28

## 2022-03-29 NOTE — REVIEW OF SYSTEMS
Antepartum Maternal-Fetal Medicine Physician Progress Note   Maya Fairbanks 38 y o  female MRN: [id number]  Unit/Bed#: [facility] 815-01 Encounter: [id number]      ASSESSMENT:  37 yo [id number] at 35 0 p/w acute asthma exacerbation secondary to very poorly controlled, severely persistent asthma, now HD # 3 and stable      PLAN:   Acute asthma exacerbation:               Albuterol neb prn              Start pulmicort on 6/4/18              Stop prednisone 20 mg q daily;               Start solumedrol 40 q8h              Eosinophilia: 0% on CBC w/diff on admit              Peak flow q6h; most recent 250 -> 300 @ 1930   Vitals: RR 18, HR 80 this am              Continuous pulseox   f/u sputum cx   d/w Pulmonary: consider ordering CXR  Seasonal allergies: claritin  GI PPx: tums, pepcid  h/o multiple hospitalizations for asthma: pulmonary c/s  Anemia: Hb 9 8; start iron bid w/colace prn  IUP: prenatal vitamin, NST q shift  Hypokalemia: K+ 3 0: s/p K-Dur 40; repeat again this am; f/u am BMP  FEN: regular, LR  Pain mngmt: tylenol, aqua k  DVT px: OOB, SCDs  Dispo: inpt until stabilized    SUBJECTIVE:    Pain: 0/10  Tolerating Oral Intake: yes  Voiding: yes - spontaneously  Flatus: yes  Bowel Movement: yes  Ambulating: yes  Chest Pain: no  Leg Pain/Discomfort: no    Vaginal Bleeding: no  Leaking of Fluid: no  Contractions: no  Fetal Movement: yes      (asthma)  coughing: yes  wheezing: yes  shortness of breath: yes        OBJECTIVE:     Vitals:   Patient Vitals for the past 24 hrs:   BP Temp Temp src Pulse Resp SpO2   06/06/18 0508 105/57 98 1 °F (36 7 °C) Oral 80 18 99 %   06/06/18 0130 - - - - - 97 %   06/05/18 2317 126/73 99 °F (37 2 °C) Oral 94 18 97 %   06/05/18 1932 - - - - - 97 %   06/05/18 1456 140/78 98 7 °F (37 1 °C) Oral (!) 118 18 97 %   06/05/18 1326 - - - - - 96 %   06/05/18 0732 - - - - - 97 %   06/05/18 0629 137/82 98 6 °F (37 °C) Oral 100 22 95 %       I/O       06/04 0701 - 06/05 0700 06/05 0701 - 06/06 0700 P O  855 1240    I V  (mL/kg) 1966 7 (21 6)     Total Intake(mL/kg) 2821 7 (31) 1240 (13 6)    Urine (mL/kg/hr) 1150 1900 (0 9)    Stool 0 0 (0)    Total Output 1150 1900    Net +1671 7 -660          Unmeasured Urine Occurrence 0 x     Unmeasured Stool Occurrence 0 x 1 x            Results from last 7 days  Lab Units 06/04/18  0538   WBC Thousand/uL 12 06*   NEUTROS ABS Thousands/µL 9 24*   HEMOGLOBIN g/dL 9 7*   MCV fL 84   PLATELETS Thousands/uL 222       Results from last 7 days  Lab Units 06/04/18  0538   NEUTROS PCT % 76*   MONOS PCT % 8   EOS PCT % 0           Results from last 7 days  Lab Units 06/04/18  0538   SODIUM mmol/L 141   POTASSIUM mmol/L 3 0*   CHLORIDE mmol/L 106   CO2 mmol/L 21   BUN mg/dL 4*   CREATININE mg/dL 0 81       Results from last 7 days  Lab Units 06/04/18  1906   RH FACTOR  Positive       There is no immunization history on file for this patient          Physical exam:  General:  No Acute Distress  Cardiovascular:  Regular Rate, Regular  rhythm  Lungs:  Bilateral inspiratory and expiratory wheezes, minimal expiratory rhonchi, no rhales  Abdomen: Soft, non-tender gravid uterus  Lower Extremities: negative Lurdes's sign bilaterally    Non-stress Tests in Trailing 24 hours:    Time of NST #1: on 6/5/18  NST: reactive   Variability: Moderate 6-25 bpm  Accelerations: 15 bpm x 15 secs or greater   Decelerations: none   Contraction Frequency (minutes): absent         MEDS:   Medication Administration - last 24 hours from 06/05/2018 0554 to 06/06/2018 0554       Date/Time Order Dose Route Action Action by     06/05/2018 0845 prenatal multivitamin tablet 1 tablet 1 tablet Oral Given Quincy Hunter     06/05/2018 2232 ondansetron (ZOFRAN) injection 4 mg 4 mg Intravenous Given Nicolasa Redding RN     06/05/2018 2232 acetaminophen (TYLENOL) tablet 650 mg 650 mg Oral Given Nicolasa Redding RN     06/05/2018 1702 ferrous sulfate tablet 325 mg 325 mg Oral Given Nicolasa Redding RN     06/05/2018 2834 ferrous sulfate tablet 325 mg 325 mg Oral Given Centre Hallda Records, RN     06/05/2018 1702 docusate sodium (COLACE) capsule 100 mg 100 mg Oral Given Autumn Reif, RN     06/05/2018 0845 docusate sodium (COLACE) capsule 100 mg 100 mg Oral Given Susan Ruben     06/05/2018 0845 loratadine (CLARITIN) tablet 10 mg 10 mg Oral Given Susan Ruben     06/05/2018 0845 potassium chloride (K-DUR,KLOR-CON) CR tablet 40 mEq 40 mEq Oral Given Susan Ruben     06/05/2018 0848 famotidine (PEPCID) injection 20 mg 20 mg Intravenous Given Soraya BRAR Terry     06/06/2018 0127 levalbuterol (Rosalia Bonnie) inhalation solution 1 25 mg 1 25 mg Nebulization Given Jesus Salvador, RT     06/05/2018 1929 levalbuterol (Rosalia Bonnie) inhalation solution 1 25 mg 1 25 mg Nebulization Given Jesus Salvador, RT     06/05/2018 1324 levalbuterol (XOPENEX) inhalation solution 1 25 mg 1 25 mg Nebulization Given Brandon Angle, RT     06/05/2018 0727 levalbuterol (XOPENEX) inhalation solution 1 25 mg 1 25 mg Nebulization Given Brandon Angle, RT     06/05/2018 1929 budesonide (PULMICORT) inhalation solution 0 5 mg 0 5 mg Nebulization Given Jesus Salvador, RT     06/05/2018 0729 budesonide (PULMICORT) inhalation solution 0 5 mg 0 5 mg Nebulization Given [facility], RT     06/05/2018 2137 methylPREDNISolone sodium succinate (Solu-MEDROL) injection 40 mg 40 mg Intravenous Given Autumn Vahid, RN     06/05/2018 1441 methylPREDNISolone sodium succinate (Solu-MEDROL) injection 40 mg 40 mg Intravenous Given Susan Ruben     06/05/2018 5713 methylPREDNISolone sodium succinate (Solu-MEDROL) injection 40 mg 40 mg Intravenous Given Centre Hallda Records, RN     06/06/2018 0127 sodium chloride 0 9 % inhalation solution 3 mL 3 mL Nebulization Given Jesus Salvador, RT     06/05/2018 1929 sodium chloride 0 9 % inhalation solution 3 mL   Nebulization Canceled Entry Jesus Salvador, RT     06/05/2018 1324 sodium chloride 0 9 % inhalation solution 3 mL 3 mL Nebulization Given Aleksandar Dunham, RT     06/05/2018 8142 sodium chloride 0 9 % inhalation solution 3 mL 3 mL Nebulization Given Aleksandar Delgadillopatrica, RT     06/05/2018 1023 guaiFENesin (MUCINEX) 12 hr tablet 600 mg 600 mg Oral Given Shefali Wooten        Current Facility-Administered Medications   Medication Dose Route Frequency    acetaminophen (TYLENOL) tablet 650 mg  650 mg Oral Q6H PRN    albuterol inhalation solution 5 mg  5 mg Nebulization Q4H PRN    budesonide (PULMICORT) inhalation solution 0 5 mg  0 5 mg Nebulization Q12H    calcium carbonate (TUMS) chewable tablet 1,000 mg  1,000 mg Oral TID PRN    docusate sodium (COLACE) capsule 100 mg  100 mg Oral BID    famotidine (PEPCID) injection 20 mg  20 mg Intravenous Q24H FAVIOLA    ferrous sulfate tablet 325 mg  325 mg Oral BID AC    guaiFENesin (MUCINEX) 12 hr tablet 600 mg  600 mg Oral Q12H PRN    levalbuterol (XOPENEX) inhalation solution 1 25 mg  1 25 mg Nebulization Q6H    loratadine (CLARITIN) tablet 10 mg  10 mg Oral Daily    methylPREDNISolone sodium succinate (Solu-MEDROL) injection 40 mg  40 mg Intravenous Q8H [facility]    ondansetron (ZOFRAN) injection 4 mg  4 mg Intravenous Q6H PRN    potassium chloride (K-DUR,KLOR-CON) CR tablet 40 mEq  40 mEq Oral Once    prenatal multivitamin tablet 1 tablet  1 tablet Oral Daily    sodium chloride 0 9 % inhalation solution 3 mL  3 mL Nebulization Q6H     Invasive Devices     Peripheral Intravenous Line            Peripheral IV 06/04/18 Right Antecubital 2 days                        Signature / Title: Jas Wells MD, Resident - Ob/Gyn    Date: 6/6/2018  Time: 5:54 AM [Negative] : Allergic/Immunologic [Fever] : no fever [Fatigue] : no fatigue [Recent Change In Weight] : ~T no recent weight change [Lower Ext Edema] : no lower extremity edema [SOB on Exertion] : no shortness of breath during exertion [Abdominal Pain] : no abdominal pain [Vomiting] : no vomiting [Diarrhea] : no diarrhea [Dysuria] : no dysuria [Vaginal Discharge] : no vaginal discharge [Dysmenorrhea/Abn Vaginal Bleeding] : no dysmenorrhea/abnormal vaginal bleeding [Skin Rash] : no skin rash [FreeTextEntry8] : intermittent pelvic pressure

## 2022-03-29 NOTE — HISTORY OF PRESENT ILLNESS
[Disease: _____________________] : Disease: [unfilled] [T: ___] : T[unfilled] [N: ___] : N[unfilled] [M: ___] : M[unfilled] [AJCC Stage: ____] : AJCC Stage: [unfilled] [de-identified] : 62-year-old Upper sor[place] female with PMH of HTN who had a Colonoscopy March 31,2017 due to lower abdominal pain and bloody BMs by dr Adis Fairbanks.  She was found to have a 3 cm infiltrating mass 7 cm from the anal verge with central ulceration and biopsy confirmed moderately differentiated invasive adenocarcinoma.  A CT a/p on 3/24/17 was negative for mets.  Her MRI pelvis from 3/31 showed some possible posterior rectal wall thickening as well as one enlarged perirectal lymph node and uterine fibroids but no obvious rectal mass was noted.  Patient consulted Dr Moore 4/5 and plan was for low anterior resection of rectum after chemoradiation.  Patient presented to us 4/11/2017 to plan further treatment.  She was started on RT/Xeloda 5/15/17 (around the same time lost her  in an accident) and planned to finish 6/22/17 then had resection in 8/17 \par \par Pt did not return for post surgery chemotherapy till January 2018 so given significant delay, adjuvant chemotherapy was not received.  \par \par She underwent loop ileostomy 8/2017 and then reversal was in 12/2017 by Dr Moore.  She was followed on surveillance thereafter. She had CT c/a/p done 1/16/19 which showed stable findings.  She was admitted to [facility] for SBO on 12/7/18 but that resolved with conservative management.  \par \par she was admitted to [facility] on 10/16/19 for closed loop SBO and underwent emergency exploratory laparotomy, IRLANDA and release of internal hernia, small bowel resection, mesenteric lymph node resection of which the final pathology was benign (segment of small bowel with serositis and serosal fibrosis and adhesions), negative margins ,multiple reactive lymph nodes.  She was re-admitted to [facility] on 10/27/19 with abdominal pain and found to have a pre-sacral abdominal collection and underwent IR  guided drainage on 10/28/19 which she tolerated well and was discharged home with outpatient follow up with Dr Moore. \par \par She had colonoscopy 3/2019 with Dr Fairbanks which was ok and is to repeat a sigmoidoscopy in 03/2020\par  [de-identified] : moderately differentiated invasive adenocarcinoma [de-identified] : [id number] [de-identified] : DOMINIC\par  [FreeTextEntry1] : RT/Xeloda 5/15-6/22/17 --> loop ileostomy 8/2017 and then reversal was in 12/2017 [de-identified] : Patient presents for follow up while on surveillance and reports some intermittent pelvic discomfort described as gas pressure for the last few weeks but not related to eating, passing gas or BM's.  She denies anorexia, weight loss, N/V, upper abdominal pain, flank pain, urinary symptoms, vaginal pain, discharge or bleeding, rectal pain, constipation, diarrhea.  Denies taking medication for symptoms.  She reports that she did call her Gyn, Dr. Suarez, as advised earlier this month regarding a right adnexal cyst seen on previous imaging.  She had a TV pelvic ultrasound earlier this month but has not followed up regarding the results or an office visit with Dr. Suarez.

## 2022-03-29 NOTE — RESULTS/DATA
[FreeTextEntry1] : EXAM: [id number] - US TRANSVAGINAL - ORDERED BY: GARY BOWMAN\par \par EXAM: [id number] - US PELVIC COMPLETE - ORDERED BY: GARY BOWMAN\par \par \par PROCEDURE DATE: 03/07/2022\par \par \par \par INTERPRETATION: CLINICAL INFORMATION: History of ovarian cyst 2 months ago for reevaluation. History of rectal cancer in 2017.\par \par LMP: The patient is postmenopausal.\par \par COMPARISON: Pelvic ultrasound dated 12/30/2021.\par \par TECHNIQUE:\par Endovaginal and transabdominal pelvic sonogram. Color Doppler was performed.\par \par FINDINGS:\par \par Uterus: Uterus is anteverted and myomatous measuring 3.9 cm x 3.2 cm x 4.8 cm.\par There is a dominant anterior intramural fundal myoma that measures 1.3 x 1.2 x 1.5 cm, not significantly changed compared with the prior study when measured comparably.\par There is a dominant posterior subserosal/intramural fundal myoma that measures 1.5 x 1.1 x 1.9 cm, not significantly changed.\par \par Endometrium: 3-4 mm. Within normal limits.\par \par Right ovary: There is a simple right adnexal cystic lesion measuring 3.4 x 2.2 x 4.2 cm, previously measuring 5.2 x 2.1 x 3.2 cm.\par Left ovary: The left ovary is not visualized. No left adnexal masses seen however.\par \par Fluid: None.\par \par IMPRESSION:\par Myomatous uterus with dominant fibroids described above.\par Simple cystic right adnexal lesion without significant interval change. Follow-up ultrasound reevaluation in 6 months is recommended. Left ovary not visualized; no left adnexal masses seen however.\par \par \par \par --- End of Report ---\par

## 2022-03-29 NOTE — PHYSICAL EXAM
[Fully active, able to carry on all pre-disease performance without restriction] : Status 0 - Fully active, able to carry on all pre-disease performance without restriction [Normal] : affect appropriate [de-identified] : anicteric [de-identified] : no JVD [de-identified] : RRR [de-identified] : no LE edema [de-identified] : soft, non-tender and non-distended

## 2022-04-05 NOTE — ED PROVIDER NOTE - NEURO NEGATIVE STATEMENT, MLM
[facility]  INTERNAL MEDICINE  [address] [place] [address]  Phone: [phone]  Fax: [phone]  Follow Up Time: 1 week    Joel Gaspar; MD)  Adv Heart Fail Trnsplnt Cardio; Cardiovascular Disease; Internal Medicine  [address] [place] [address], [address] [place] [address]  Phone: [phone]  Fax: [phone]  Follow Up Time: 1 week    Pardeep Soto)  Cardiac Electrophysiology; Cardiology; Internal Medicine  [address] [place] [address]  Phone: [phone]  Fax: [phone]  Follow Up Time: 2 weeks   no loss of consciousness, no gait abnormality, no headache, no sensory deficits, and no weakness.

## 2022-06-27 ENCOUNTER — APPOINTMENT (OUTPATIENT)
Dept: SURGICAL ONCOLOGY | Facility: CLINIC | Age: 67
End: 2022-06-27
Payer: MEDICARE

## 2022-06-27 VITALS
SYSTOLIC BLOOD PRESSURE: 131 MMHG | BODY MASS INDEX: 19.32 KG/M2 | HEART RATE: 57 BPM | RESPIRATION RATE: 16 BRPM | HEIGHT: 62 IN | OXYGEN SATURATION: 98 % | WEIGHT: 105 LBS | DIASTOLIC BLOOD PRESSURE: 75 MMHG | TEMPERATURE: 97.2 F

## 2022-06-27 PROCEDURE — 99214 OFFICE O/P EST MOD 30 MIN: CPT

## 2022-06-27 NOTE — ASSESSMENT
[FreeTextEntry1] : Imp:\par JENNI - hx of Rectal cancer\par \par CT C/A/P 9/20/2021-  IMP: No evidence of metastatic disease. Interval enlargement of a right adnexal cyst for which correlation with pelvis sonogram is recommended. \par \par Colonoscopy as per Dr. Velasquez  (negative in march, 2021, repeat colonoscopy 3/2023 )\par \par Plan:\par Bloodwork by Dr. Jones \par CT C/A/P 9/2022 (as per Med-onc)\par RTO yearly

## 2022-06-27 NOTE — PHYSICAL EXAM
[Normal] : supple, no neck mass and thyroid not enlarged [Normal Supraclavicular Lymph Nodes] : normal supraclavicular lymph nodes [Normal Groin Lymph Nodes] : normal groin lymph nodes [Normal] : oriented to person, place and time, with appropriate affect [de-identified] : no masses or tenderness; no hepatomegaly

## 2022-06-27 NOTE — HISTORY OF PRESENT ILLNESS
[de-identified] : Ms Medeiros is a 67 y.o. female who presents for a 6 month follow up visit.  \par \par Hx rectal adenocarcinoma \par She began neoadjuvant chemoradiation on 5/15/17 under the care of Dr. Jones and Dr. Booker which concluded on 6/22/17.\par \par She is s/p LAR with loop ileostomy on 8/4/17. Final pathology showed residual adenocarcinoma of the rectum (0.9 cm). 13 lymph nodes and margins were negative though tumor deposits were present. Stage T2N1c. She was re admitted on 8/31/17 with an SBO which resolved with NGT decompression. She is now post/op small bowel resection to close loop ileostomy with lysis of adhesions for release of internal hernia on 12/1/17.\par \par She was hospitalized at [facility] from 12/7-12/9/18 with abdominal pain/nausea.  CT A/P obtained showed SBO with possible transition point in the midpelvis. On the day after admission she passed multiple small bowel movements and continued to pass flatus and was D/C home. \par \par She is s/p rigid sigmoidoscopy and rectal dilatation for rectal stricture from low anterior anastomosis on 1/9/19. \par \par CT C/A/P 1/16/19 -Stable exam. No evidence of metastatic disease. \par \par She presented to [facility] ER on 10/16/19 after 2 days of worsening abdominal pain, nausea w/o vomiting.  CT A/P obtained in the ED showed closed loop SBO with 2 transition points.  She went emergency to the OR on 10/16/19 for an exploratory laparotomy, IRLANDA and release of internal hernia, small bowel resection, mesenteric lymph node resection.   Final pathology was benign (segment of small bowel with serositis and serosal fibrosis and adhesions), negative margins ,multiple reactive lymph nodes. \par \par She was re-admitted to [facility] on 10/27/19 with c/o generalized abdominal pain and found on CT to have a pre-sacral abdominal collection.  She was admitted and made NPO.  She went to IR on 10/28 for drainage.  She tolerated the procedure well.  She was discharged home on 10/30/19. \par \par CT C/A/P 1/6/2020- Resolution of presacral collection with residual presacral soft tissue thickening . No recurrent or metastatic disease is identified. \par \par Underwent a colonoscopy by Dr. Fairbanks March 2020 which was unremarkable with no evidence of recurrence.\par \par CT C/A/P 9/25/2020: A few less than 5 mm pulmonary nodules are stable. Stable presacral thickening. \par \par She changed GI care to Dr. Vance Velasquez.\par Endoscopy 3/1/2021- Chronic gastritis without bleeding\par Colonoscopy 3/1/2021- Well healed surgical anastomosis, no local recurrence, diminutive flat polyp of size 5 mm at distance 20cm (polypectomy done), \par \par CT C/A/P 9/20/2021-  IMP: No evidence of metastatic disease. Interval enlargement of a right adnexal cyst for which correlation with pelvis sonogram is recommended. \par \par [place] followed up with Dr. Suarez regarding the adnexal cyst, last saw her in March of 2022, states ultrasound and imaging done by GYN. \par \par Denies nausea, vomiting, changes in appetite or bowel habits. Denies BRBPR. \par Internist: Dr. Trevor Howell.

## 2022-09-07 ENCOUNTER — RESULT REVIEW (OUTPATIENT)
Age: 67
End: 2022-09-07

## 2022-09-20 ENCOUNTER — APPOINTMENT (OUTPATIENT)
Dept: CT IMAGING | Facility: IMAGING CENTER | Age: 67
End: 2022-09-20

## 2022-09-20 ENCOUNTER — OUTPATIENT (OUTPATIENT)
Dept: OUTPATIENT SERVICES | Facility: HOSPITAL | Age: 67
LOS: 1 days | End: 2022-09-20
Payer: MEDICARE

## 2022-09-20 DIAGNOSIS — C20 MALIGNANT NEOPLASM OF RECTUM: ICD-10-CM

## 2022-09-20 DIAGNOSIS — Z98.890 OTHER SPECIFIED POSTPROCEDURAL STATES: Chronic | ICD-10-CM

## 2022-09-20 DIAGNOSIS — Z90.49 ACQUIRED ABSENCE OF OTHER SPECIFIED PARTS OF DIGESTIVE TRACT: Chronic | ICD-10-CM

## 2022-09-20 PROCEDURE — 74177 CT ABD & PELVIS W/CONTRAST: CPT | Mod: 26

## 2022-09-20 PROCEDURE — 74177 CT ABD & PELVIS W/CONTRAST: CPT

## 2022-09-20 PROCEDURE — 71260 CT THORAX DX C+: CPT

## 2022-09-20 PROCEDURE — 71260 CT THORAX DX C+: CPT | Mod: 26

## 2022-09-23 ENCOUNTER — OUTPATIENT (OUTPATIENT)
Dept: OUTPATIENT SERVICES | Facility: HOSPITAL | Age: 67
LOS: 1 days | Discharge: HOME | End: 2022-09-23

## 2022-09-23 DIAGNOSIS — Z98.890 OTHER SPECIFIED POSTPROCEDURAL STATES: Chronic | ICD-10-CM

## 2022-09-23 DIAGNOSIS — C20 MALIGNANT NEOPLASM OF RECTUM: ICD-10-CM

## 2022-09-23 DIAGNOSIS — Z90.49 ACQUIRED ABSENCE OF OTHER SPECIFIED PARTS OF DIGESTIVE TRACT: Chronic | ICD-10-CM

## 2022-09-28 ENCOUNTER — APPOINTMENT (OUTPATIENT)
Dept: HEMATOLOGY ONCOLOGY | Facility: CLINIC | Age: 67
End: 2022-09-28

## 2022-09-28 VITALS
HEART RATE: 53 BPM | DIASTOLIC BLOOD PRESSURE: 76 MMHG | SYSTOLIC BLOOD PRESSURE: 145 MMHG | WEIGHT: 106.92 LBS | BODY MASS INDEX: 19.43 KG/M2 | RESPIRATION RATE: 18 BRPM | HEIGHT: 62.01 IN | TEMPERATURE: 97.3 F | OXYGEN SATURATION: 98 %

## 2022-09-28 PROCEDURE — 99214 OFFICE O/P EST MOD 30 MIN: CPT

## 2022-09-28 NOTE — REVIEW OF SYSTEMS
[Negative] : Allergic/Immunologic [Fever] : no fever [Fatigue] : no fatigue [Recent Change In Weight] : ~T no recent weight change [Lower Ext Edema] : no lower extremity edema [SOB on Exertion] : no shortness of breath during exertion [Abdominal Pain] : no abdominal pain [Vomiting] : no vomiting [Diarrhea] : no diarrhea [Dysuria] : no dysuria [Vaginal Discharge] : no vaginal discharge [Dysmenorrhea/Abn Vaginal Bleeding] : no dysmenorrhea/abnormal vaginal bleeding [Skin Rash] : no skin rash

## 2022-09-28 NOTE — PHYSICAL EXAM
[Fully active, able to carry on all pre-disease performance without restriction] : Status 0 - Fully active, able to carry on all pre-disease performance without restriction [Normal] : affect appropriate [de-identified] : anicteric [de-identified] : no JVD [de-identified] : normal respiratory effort, no audible wheeze [de-identified] : reg rate  [de-identified] : no LE edema [de-identified] :   non-distended

## 2022-09-28 NOTE — HISTORY OF PRESENT ILLNESS
[Disease: _____________________] : Disease: [unfilled] [T: ___] : T[unfilled] [N: ___] : N[unfilled] Yes [M: ___] : M[unfilled] [AJCC Stage: ____] : AJCC Stage: [unfilled] [de-identified] : 62-year-old [place] female with PMH of HTN who had a Colonoscopy March 31,2017 due to lower abdominal pain and bloody BMs by dr Adis Fairbanks.  She was found to have a 3 cm infiltrating mass 7 cm from the anal verge with central ulceration and biopsy confirmed moderately differentiated invasive adenocarcinoma.  A CT a/p on 3/24/17 was negative for mets.  Her MRI pelvis from 3/31 showed some possible posterior rectal wall thickening as well as one enlarged perirectal lymph node and uterine fibroids but no obvious rectal mass was noted.  Patient consulted Dr Moore 4/5 and plan was for low anterior resection of rectum after chemoradiation.  Patient presented to us 4/11/2017 to plan further treatment.  She was started on RT/Xeloda 5/15/17 (around the same time lost her  in an accident) and planned to finish 6/22/17 then had resection in 8/17 \par \par Pt did not return for post surgery chemotherapy till January 2018 so given significant delay, adjuvant chemotherapy was not received.  \par \par She underwent loop ileostomy 8/2017 and then reversal was in 12/2017 by Dr Moore.  She was followed on surveillance thereafter. She had CT c/a/p done 1/16/19 which showed stable findings.  She was admitted to [facility] for SBO on 12/7/18 but that resolved with conservative management.  \par \par she was admitted to [facility] on 10/16/19 for closed loop SBO and underwent emergency exploratory laparotomy, IRLANDA and release of internal hernia, small bowel resection, mesenteric lymph node resection of which the final pathology was benign (segment of small bowel with serositis and serosal fibrosis and adhesions), negative margins ,multiple reactive lymph nodes.  She was re-admitted to [facility] on 10/27/19 with abdominal pain and found to have a pre-sacral abdominal collection and underwent IR  guided drainage on 10/28/19 which she tolerated well and was discharged home with outpatient follow up with Dr Moore. \par \par She had colonoscopy 3/2019 with Dr Fairbanks which was ok and is to repeat a sigmoidoscopy in 03/2020\par  [de-identified] : moderately differentiated invasive adenocarcinoma [de-identified] : [id number] [de-identified] : DOMINIC\par  [FreeTextEntry1] : RT/Xeloda 5/15-6/22/17 --> loop ileostomy 8/2017 and then reversal was in 12/2017 [de-identified] : no new complaints\par presents to review imaging results\par no new complaints

## 2023-02-17 ENCOUNTER — OUTPATIENT (OUTPATIENT)
Dept: OUTPATIENT SERVICES | Facility: HOSPITAL | Age: 68
LOS: 1 days | End: 2023-02-17
Payer: MEDICARE

## 2023-02-17 ENCOUNTER — APPOINTMENT (OUTPATIENT)
Dept: RADIOLOGY | Facility: IMAGING CENTER | Age: 68
End: 2023-02-17
Payer: MEDICARE

## 2023-02-17 DIAGNOSIS — Z00.8 ENCOUNTER FOR OTHER GENERAL EXAMINATION: ICD-10-CM

## 2023-02-17 DIAGNOSIS — Z98.890 OTHER SPECIFIED POSTPROCEDURAL STATES: Chronic | ICD-10-CM

## 2023-02-17 PROCEDURE — 72100 X-RAY EXAM L-S SPINE 2/3 VWS: CPT | Mod: 26

## 2023-02-17 PROCEDURE — 72100 X-RAY EXAM L-S SPINE 2/3 VWS: CPT

## 2023-03-03 ENCOUNTER — APPOINTMENT (OUTPATIENT)
Dept: ULTRASOUND IMAGING | Facility: IMAGING CENTER | Age: 68
End: 2023-03-03
Payer: MEDICARE

## 2023-03-03 ENCOUNTER — OUTPATIENT (OUTPATIENT)
Dept: OUTPATIENT SERVICES | Facility: HOSPITAL | Age: 68
LOS: 1 days | End: 2023-03-03
Payer: MEDICARE

## 2023-03-03 DIAGNOSIS — N83.209 UNSPECIFIED OVARIAN CYST, UNSPECIFIED SIDE: ICD-10-CM

## 2023-03-03 DIAGNOSIS — Z90.49 ACQUIRED ABSENCE OF OTHER SPECIFIED PARTS OF DIGESTIVE TRACT: Chronic | ICD-10-CM

## 2023-03-03 DIAGNOSIS — Z98.890 OTHER SPECIFIED POSTPROCEDURAL STATES: Chronic | ICD-10-CM

## 2023-03-03 PROCEDURE — 76856 US EXAM PELVIC COMPLETE: CPT | Mod: 26

## 2023-03-03 PROCEDURE — 76830 TRANSVAGINAL US NON-OB: CPT

## 2023-03-03 PROCEDURE — 76830 TRANSVAGINAL US NON-OB: CPT | Mod: 26

## 2023-03-03 PROCEDURE — 76856 US EXAM PELVIC COMPLETE: CPT

## 2023-05-13 ENCOUNTER — NON-APPOINTMENT (OUTPATIENT)
Age: 68
End: 2023-05-13

## 2023-05-14 ENCOUNTER — EMERGENCY (EMERGENCY)
Facility: HOSPITAL | Age: 68
LOS: 1 days | Discharge: HOME | End: 2023-05-14
Attending: EMERGENCY MEDICINE | Admitting: EMERGENCY MEDICINE
Payer: MEDICARE

## 2023-05-14 VITALS
DIASTOLIC BLOOD PRESSURE: 76 MMHG | RESPIRATION RATE: 18 BRPM | HEART RATE: 66 BPM | SYSTOLIC BLOOD PRESSURE: 163 MMHG | OXYGEN SATURATION: 100 % | TEMPERATURE: 98 F

## 2023-05-14 DIAGNOSIS — Z98.890 OTHER SPECIFIED POSTPROCEDURAL STATES: Chronic | ICD-10-CM

## 2023-05-14 DIAGNOSIS — Z90.49 ACQUIRED ABSENCE OF OTHER SPECIFIED PARTS OF DIGESTIVE TRACT: Chronic | ICD-10-CM

## 2023-05-14 LAB
ALBUMIN SERPL ELPH-MCNC: 4.7 G/DL — SIGNIFICANT CHANGE UP (ref 3.3–5)
ALP SERPL-CCNC: 81 U/L — SIGNIFICANT CHANGE UP (ref 40–120)
ALT FLD-CCNC: 144 U/L — HIGH (ref 4–33)
ANION GAP SERPL CALC-SCNC: 12 MMOL/L — SIGNIFICANT CHANGE UP (ref 7–14)
APPEARANCE UR: CLEAR — SIGNIFICANT CHANGE UP
AST SERPL-CCNC: 218 U/L — HIGH (ref 4–32)
BASE EXCESS BLDV CALC-SCNC: 7.8 MMOL/L — HIGH (ref -2–3)
BASOPHILS # BLD AUTO: 0.02 K/UL — SIGNIFICANT CHANGE UP (ref 0–0.2)
BASOPHILS NFR BLD AUTO: 0.4 % — SIGNIFICANT CHANGE UP (ref 0–2)
BILIRUB SERPL-MCNC: 0.8 MG/DL — SIGNIFICANT CHANGE UP (ref 0.2–1.2)
BILIRUB UR-MCNC: NEGATIVE — SIGNIFICANT CHANGE UP
BLOOD GAS VENOUS COMPREHENSIVE RESULT: SIGNIFICANT CHANGE UP
BUN SERPL-MCNC: 8 MG/DL — SIGNIFICANT CHANGE UP (ref 7–23)
CALCIUM SERPL-MCNC: 10.1 MG/DL — SIGNIFICANT CHANGE UP (ref 8.4–10.5)
CHLORIDE BLDV-SCNC: 102 MMOL/L — SIGNIFICANT CHANGE UP (ref 96–108)
CHLORIDE SERPL-SCNC: 100 MMOL/L — SIGNIFICANT CHANGE UP (ref 98–107)
CO2 BLDV-SCNC: 37.5 MMOL/L — HIGH (ref 22–26)
CO2 SERPL-SCNC: 30 MMOL/L — SIGNIFICANT CHANGE UP (ref 22–31)
COLOR SPEC: SIGNIFICANT CHANGE UP
CREAT SERPL-MCNC: 0.65 MG/DL — SIGNIFICANT CHANGE UP (ref 0.5–1.3)
DIFF PNL FLD: NEGATIVE — SIGNIFICANT CHANGE UP
EGFR: 95 ML/MIN/1.73M2 — SIGNIFICANT CHANGE UP
EOSINOPHIL # BLD AUTO: 0.03 K/UL — SIGNIFICANT CHANGE UP (ref 0–0.5)
EOSINOPHIL NFR BLD AUTO: 0.6 % — SIGNIFICANT CHANGE UP (ref 0–6)
GAS PNL BLDV: 139 MMOL/L — SIGNIFICANT CHANGE UP (ref 136–145)
GAS PNL BLDV: SIGNIFICANT CHANGE UP
GLUCOSE BLDV-MCNC: 92 MG/DL — SIGNIFICANT CHANGE UP (ref 70–99)
GLUCOSE SERPL-MCNC: 92 MG/DL — SIGNIFICANT CHANGE UP (ref 70–99)
GLUCOSE UR QL: NEGATIVE — SIGNIFICANT CHANGE UP
HCO3 BLDV-SCNC: 36 MMOL/L — HIGH (ref 22–29)
HCT VFR BLD CALC: 41 % — SIGNIFICANT CHANGE UP (ref 34.5–45)
HCT VFR BLDA CALC: 41 % — SIGNIFICANT CHANGE UP (ref 34.5–46.5)
HGB BLD CALC-MCNC: 13.8 G/DL — SIGNIFICANT CHANGE UP (ref 11.7–16.1)
HGB BLD-MCNC: 13.4 G/DL — SIGNIFICANT CHANGE UP (ref 11.5–15.5)
IANC: 3.32 K/UL — SIGNIFICANT CHANGE UP (ref 1.8–7.4)
IMM GRANULOCYTES NFR BLD AUTO: 0.2 % — SIGNIFICANT CHANGE UP (ref 0–0.9)
KETONES UR-MCNC: NEGATIVE — SIGNIFICANT CHANGE UP
LACTATE BLDV-MCNC: 1 MMOL/L — SIGNIFICANT CHANGE UP (ref 0.5–2)
LEUKOCYTE ESTERASE UR-ACNC: NEGATIVE — SIGNIFICANT CHANGE UP
LIDOCAIN IGE QN: 47 U/L — SIGNIFICANT CHANGE UP (ref 7–60)
LYMPHOCYTES # BLD AUTO: 1.03 K/UL — SIGNIFICANT CHANGE UP (ref 1–3.3)
LYMPHOCYTES # BLD AUTO: 21.5 % — SIGNIFICANT CHANGE UP (ref 13–44)
MCHC RBC-ENTMCNC: 31.5 PG — SIGNIFICANT CHANGE UP (ref 27–34)
MCHC RBC-ENTMCNC: 32.7 GM/DL — SIGNIFICANT CHANGE UP (ref 32–36)
MCV RBC AUTO: 96.5 FL — SIGNIFICANT CHANGE UP (ref 80–100)
MONOCYTES # BLD AUTO: 0.38 K/UL — SIGNIFICANT CHANGE UP (ref 0–0.9)
MONOCYTES NFR BLD AUTO: 7.9 % — SIGNIFICANT CHANGE UP (ref 2–14)
NEUTROPHILS # BLD AUTO: 3.32 K/UL — SIGNIFICANT CHANGE UP (ref 1.8–7.4)
NEUTROPHILS NFR BLD AUTO: 69.4 % — SIGNIFICANT CHANGE UP (ref 43–77)
NITRITE UR-MCNC: NEGATIVE — SIGNIFICANT CHANGE UP
NRBC # BLD: 0 /100 WBCS — SIGNIFICANT CHANGE UP (ref 0–0)
NRBC # FLD: 0 K/UL — SIGNIFICANT CHANGE UP (ref 0–0)
PCO2 BLDV: 63 MMHG — HIGH (ref 39–52)
PH BLDV: 7.36 — SIGNIFICANT CHANGE UP (ref 7.32–7.43)
PH UR: 7.5 — SIGNIFICANT CHANGE UP (ref 5–8)
PLATELET # BLD AUTO: 210 K/UL — SIGNIFICANT CHANGE UP (ref 150–400)
PO2 BLDV: 37 MMHG — SIGNIFICANT CHANGE UP (ref 25–45)
POTASSIUM BLDV-SCNC: 3.8 MMOL/L — SIGNIFICANT CHANGE UP (ref 3.5–5.1)
POTASSIUM SERPL-MCNC: 4.4 MMOL/L — SIGNIFICANT CHANGE UP (ref 3.5–5.3)
POTASSIUM SERPL-SCNC: 4.4 MMOL/L — SIGNIFICANT CHANGE UP (ref 3.5–5.3)
PROT SERPL-MCNC: 7.6 G/DL — SIGNIFICANT CHANGE UP (ref 6–8.3)
PROT UR-MCNC: NEGATIVE — SIGNIFICANT CHANGE UP
RBC # BLD: 4.25 M/UL — SIGNIFICANT CHANGE UP (ref 3.8–5.2)
RBC # FLD: 13 % — SIGNIFICANT CHANGE UP (ref 10.3–14.5)
SAO2 % BLDV: 59.2 % — LOW (ref 67–88)
SODIUM SERPL-SCNC: 142 MMOL/L — SIGNIFICANT CHANGE UP (ref 135–145)
SP GR SPEC: 1.01 — LOW (ref 1.01–1.05)
TROPONIN T, HIGH SENSITIVITY RESULT: <6 NG/L — SIGNIFICANT CHANGE UP
UROBILINOGEN FLD QL: SIGNIFICANT CHANGE UP
WBC # BLD: 4.79 K/UL — SIGNIFICANT CHANGE UP (ref 3.8–10.5)
WBC # FLD AUTO: 4.79 K/UL — SIGNIFICANT CHANGE UP (ref 3.8–10.5)

## 2023-05-14 PROCEDURE — 71046 X-RAY EXAM CHEST 2 VIEWS: CPT | Mod: 26

## 2023-05-14 PROCEDURE — 93010 ELECTROCARDIOGRAM REPORT: CPT

## 2023-05-14 PROCEDURE — 99284 EMERGENCY DEPT VISIT MOD MDM: CPT

## 2023-05-14 RX ORDER — SODIUM CHLORIDE 9 MG/ML
1000 INJECTION INTRAMUSCULAR; INTRAVENOUS; SUBCUTANEOUS ONCE
Refills: 0 | Status: COMPLETED | OUTPATIENT
Start: 2023-05-14 | End: 2023-05-14

## 2023-05-14 RX ORDER — ONDANSETRON 8 MG/1
4 TABLET, FILM COATED ORAL ONCE
Refills: 0 | Status: COMPLETED | OUTPATIENT
Start: 2023-05-14 | End: 2023-05-14

## 2023-05-14 RX ORDER — FAMOTIDINE 10 MG/ML
20 INJECTION INTRAVENOUS ONCE
Refills: 0 | Status: COMPLETED | OUTPATIENT
Start: 2023-05-14 | End: 2023-05-14

## 2023-05-14 RX ADMIN — Medication 30 MILLILITER(S): at 21:53

## 2023-05-14 RX ADMIN — SODIUM CHLORIDE 1000 MILLILITER(S): 9 INJECTION INTRAMUSCULAR; INTRAVENOUS; SUBCUTANEOUS at 21:53

## 2023-05-14 RX ADMIN — FAMOTIDINE 20 MILLIGRAM(S): 10 INJECTION INTRAVENOUS at 21:53

## 2023-05-14 RX ADMIN — ONDANSETRON 4 MILLIGRAM(S): 8 TABLET, FILM COATED ORAL at 21:53

## 2023-05-14 RX ADMIN — SODIUM CHLORIDE 1000 MILLILITER(S): 9 INJECTION INTRAMUSCULAR; INTRAVENOUS; SUBCUTANEOUS at 23:22

## 2023-05-14 NOTE — ED PROVIDER NOTE - CLINICAL SUMMARY MEDICAL DECISION MAKING FREE TEXT BOX
68-year-old female with past medical history of colorectal cancer in remission, surgeries for SBO complaining of a 1 day history of localized intermittent moderate sharp epigastric pain that  began with eating.  Pain was unrelieved with omeprazole use.   Patient is otherwise asymptomatic. Denies fevers, chills, nausea, vomiting, dizziness, chest pain, SOB, dysuria, hematuria. Epigastric TTP, will CTAP given sx hx. Will screen for ACS (troponin), anemia/electrolytes, and chest x ray to screen for cardiopulmonary pathology.   Will p.o. challenge patient if imaging is unremarkable and possible discharge with close PCP follow-up.

## 2023-05-14 NOTE — ED PROVIDER NOTE - NSFOLLOWUPINSTRUCTIONS_ED_ALL_ED_FT
Abdominal Pain    Many things can cause abdominal pain. Many times, abdominal pain is not caused by a disease and will improve without treatment. Your health care provider will do a physical exam to determine if there is a dangerous cause of your pain; blood tests and imaging may help determine the cause of your pain. However, in many cases, no cause may be found and you may need further testing as an outpatient. Monitor your abdominal pain for any changes.     SEEK IMMEDIATE MEDICAL CARE IF YOU HAVE ANY OF THE FOLLOWING SYMPTOMS: worsening abdominal pain, uncontrollable vomiting, profuse diarrhea, inability to have bowel movements or pass gas, black or bloody stools, fever accompanying chest pain or back pain, or fainting. These symptoms may represent a serious problem that is an emergency. Do not wait to see if the symptoms will go away. Get medical help right away. Call 911 and do not drive yourself to the hospital.    The results of any blood tests and imaging studies completed during your visit today were discussed and explained to you and a copy provided with your discharge instructions. Please follow up with your primary care doctor within 48 hours.

## 2023-05-14 NOTE — ED ADULT NURSE NOTE - NSFALLUNIVINTERV_ED_ALL_ED
Bed/Stretcher in lowest position, wheels locked, appropriate side rails in place/Call bell, personal items and telephone in reach/Instruct patient to call for assistance before getting out of bed/chair/stretcher/Non-slip footwear applied when patient is off stretcher/[place] to call system/Physically safe environment - no spills, clutter or unnecessary equipment/Purposeful proactive rounding/Room/bathroom lighting operational, light cord in reach

## 2023-05-14 NOTE — ED PROVIDER NOTE - OBJECTIVE STATEMENT
68-year-old female with past medical history of colorectal cancer in remission, surgeries for SBO complaining of a 1 day history of localized intermittent moderate sharp epigastric pain that  began with eating.  Pain was unrelieved with omeprazole use.   Patient is otherwise asymptomatic. Denies fevers, chills, nausea, vomiting, dizziness, chest pain, SOB, dysuria, hematuria.

## 2023-05-14 NOTE — ED PROVIDER NOTE - PROGRESS NOTE DETAILS
Patient stable pain control does not want anything for pain at this time.  CBC unremarkable no lactate pending rest of her labs and imaging to result Wilmar NGUYEN: Pt is stable and ready for discharge. Strict return precautions given. All questions answered. PO challenge passed. Spoke w/ pt and son about need for MRI abd and liver enzyme trend outpatient w/ PCP.

## 2023-05-14 NOTE — ED PROVIDER NOTE - PHYSICAL EXAMINATION
GENERAL: NAD  HEENT:  Atraumatic  CHEST/LUNG: Chest rise equal bilaterally  HEART: Regular rate and rhythm  ABDOMEN: Soft, Epigastric TTP, Nondistended  EXTREMITIES:  Extremities warm  PSYCH: A&Ox3  SKIN: No obvious rashes or lesions   NEUROLOGY: strength and sensation intact in all extremities. Ambulatory without difficulty.

## 2023-05-14 NOTE — ED ADULT TRIAGE NOTE - CHIEF COMPLAINT QUOTE
epigastric pain    pt c/o epigastric pain for 12 day.  denies nausea, vomiting, diarrhea.  had normal bm today.  pmhx- colorectal cancer with resection (in remission), sbo

## 2023-05-14 NOTE — ED PROVIDER NOTE - ATTENDING CONTRIBUTION TO CARE
Attending Statement: I have personally seen and examined this patient. I have fully participated in the care of this patient. I have reviewed all pertinent clinical information, including history physical exam, plan and the Resident's note and agree except as noted  68-year-old female with a history of colorectal cancer in remission, history of prior SBO from home chief complaint of abdominal pain since yesterday.  Pain is located in the epigastric upper abdomen, constant, nonradiating.  Denies any chest pain denies any shortness of breath denies any nausea, vomiting or diarrhea.  No fever no chills.  No urinary complaints.  Vital signs noted patient sitting up looks uncomfortable in pain but not toxic.  Not icteric not jaundiced.  No respiratory distress.  Soft abdomen tender in the mid epigastric with no rebound no guarding no Rivera sign not distended.  Plan labs, EKG, x-ray, CT abdomen pelvis, pain meds and reassess

## 2023-05-14 NOTE — ED PROVIDER NOTE - PATIENT PORTAL LINK FT
You can access the FollowMyHealth Patient Portal offered by [facility] by registering at the following website: [email or web address]. By joining [facility]’s FollowMyHealth portal, you will also be able to view your health information using other applications (apps) compatible with our system.

## 2023-05-15 VITALS
SYSTOLIC BLOOD PRESSURE: 142 MMHG | DIASTOLIC BLOOD PRESSURE: 78 MMHG | RESPIRATION RATE: 18 BRPM | TEMPERATURE: 98 F | OXYGEN SATURATION: 99 % | HEART RATE: 68 BPM

## 2023-05-15 LAB
ALBUMIN SERPL ELPH-MCNC: 3.4 G/DL — SIGNIFICANT CHANGE UP (ref 3.3–5)
ALP SERPL-CCNC: 62 U/L — SIGNIFICANT CHANGE UP (ref 40–120)
ALT FLD-CCNC: 120 U/L — HIGH (ref 4–33)
ANION GAP SERPL CALC-SCNC: 9 MMOL/L — SIGNIFICANT CHANGE UP (ref 7–14)
AST SERPL-CCNC: 162 U/L — HIGH (ref 4–32)
BILIRUB SERPL-MCNC: 0.6 MG/DL — SIGNIFICANT CHANGE UP (ref 0.2–1.2)
BUN SERPL-MCNC: 7 MG/DL — SIGNIFICANT CHANGE UP (ref 7–23)
CALCIUM SERPL-MCNC: 8.6 MG/DL — SIGNIFICANT CHANGE UP (ref 8.4–10.5)
CHLORIDE SERPL-SCNC: 106 MMOL/L — SIGNIFICANT CHANGE UP (ref 98–107)
CO2 SERPL-SCNC: 24 MMOL/L — SIGNIFICANT CHANGE UP (ref 22–31)
CREAT SERPL-MCNC: 0.56 MG/DL — SIGNIFICANT CHANGE UP (ref 0.5–1.3)
CULTURE RESULTS: SIGNIFICANT CHANGE UP
EGFR: 99 ML/MIN/1.73M2 — SIGNIFICANT CHANGE UP
GLUCOSE SERPL-MCNC: 90 MG/DL — SIGNIFICANT CHANGE UP (ref 70–99)
POTASSIUM SERPL-MCNC: 3.8 MMOL/L — SIGNIFICANT CHANGE UP (ref 3.5–5.3)
POTASSIUM SERPL-SCNC: 3.8 MMOL/L — SIGNIFICANT CHANGE UP (ref 3.5–5.3)
PROT SERPL-MCNC: 5.8 G/DL — LOW (ref 6–8.3)
SODIUM SERPL-SCNC: 139 MMOL/L — SIGNIFICANT CHANGE UP (ref 135–145)
SPECIMEN SOURCE: SIGNIFICANT CHANGE UP
TROPONIN T, HIGH SENSITIVITY RESULT: <6 NG/L — SIGNIFICANT CHANGE UP

## 2023-05-15 PROCEDURE — G1004: CPT

## 2023-05-15 PROCEDURE — 74177 CT ABD & PELVIS W/CONTRAST: CPT | Mod: 26,ME

## 2023-05-17 ENCOUNTER — INPATIENT (INPATIENT)
Facility: HOSPITAL | Age: 68
LOS: 7 days | Discharge: HOME | End: 2023-05-25
Attending: SURGERY | Admitting: SURGERY
Payer: MEDICARE

## 2023-05-17 VITALS
HEART RATE: 66 BPM | SYSTOLIC BLOOD PRESSURE: 138 MMHG | OXYGEN SATURATION: 99 % | RESPIRATION RATE: 18 BRPM | TEMPERATURE: 98 F | DIASTOLIC BLOOD PRESSURE: 61 MMHG | HEIGHT: 62 IN

## 2023-05-17 DIAGNOSIS — Z98.890 OTHER SPECIFIED POSTPROCEDURAL STATES: Chronic | ICD-10-CM

## 2023-05-17 DIAGNOSIS — Z90.49 ACQUIRED ABSENCE OF OTHER SPECIFIED PARTS OF DIGESTIVE TRACT: Chronic | ICD-10-CM

## 2023-05-17 LAB
ALBUMIN SERPL ELPH-MCNC: 4.5 G/DL — SIGNIFICANT CHANGE UP (ref 3.3–5)
ALP SERPL-CCNC: 161 U/L — HIGH (ref 40–120)
ALT FLD-CCNC: 374 U/L — HIGH (ref 4–33)
ANION GAP SERPL CALC-SCNC: 13 MMOL/L — SIGNIFICANT CHANGE UP (ref 7–14)
AST SERPL-CCNC: 447 U/L — HIGH (ref 4–32)
BASE EXCESS BLDV CALC-SCNC: 4.9 MMOL/L — HIGH (ref -2–3)
BASOPHILS # BLD AUTO: 0.02 K/UL — SIGNIFICANT CHANGE UP (ref 0–0.2)
BASOPHILS NFR BLD AUTO: 0.4 % — SIGNIFICANT CHANGE UP (ref 0–2)
BILIRUB SERPL-MCNC: 1.3 MG/DL — HIGH (ref 0.2–1.2)
BLOOD GAS VENOUS COMPREHENSIVE RESULT: SIGNIFICANT CHANGE UP
BUN SERPL-MCNC: 10 MG/DL — SIGNIFICANT CHANGE UP (ref 7–23)
CALCIUM SERPL-MCNC: 9.8 MG/DL — SIGNIFICANT CHANGE UP (ref 8.4–10.5)
CHLORIDE BLDV-SCNC: 104 MMOL/L — SIGNIFICANT CHANGE UP (ref 96–108)
CHLORIDE SERPL-SCNC: 103 MMOL/L — SIGNIFICANT CHANGE UP (ref 98–107)
CO2 BLDV-SCNC: 33 MMOL/L — HIGH (ref 22–26)
CO2 SERPL-SCNC: 25 MMOL/L — SIGNIFICANT CHANGE UP (ref 22–31)
CREAT SERPL-MCNC: 0.62 MG/DL — SIGNIFICANT CHANGE UP (ref 0.5–1.3)
EGFR: 96 ML/MIN/1.73M2 — SIGNIFICANT CHANGE UP
EOSINOPHIL # BLD AUTO: 0.02 K/UL — SIGNIFICANT CHANGE UP (ref 0–0.5)
EOSINOPHIL NFR BLD AUTO: 0.4 % — SIGNIFICANT CHANGE UP (ref 0–6)
GAS PNL BLDV: 135 MMOL/L — LOW (ref 136–145)
GLUCOSE BLDV-MCNC: 96 MG/DL — SIGNIFICANT CHANGE UP (ref 70–99)
GLUCOSE SERPL-MCNC: 94 MG/DL — SIGNIFICANT CHANGE UP (ref 70–99)
HCO3 BLDV-SCNC: 31 MMOL/L — HIGH (ref 22–29)
HCT VFR BLD CALC: 39.5 % — SIGNIFICANT CHANGE UP (ref 34.5–45)
HCT VFR BLDA CALC: 41 % — SIGNIFICANT CHANGE UP (ref 34.5–46.5)
HGB BLD CALC-MCNC: 13.5 G/DL — SIGNIFICANT CHANGE UP (ref 11.7–16.1)
HGB BLD-MCNC: 12.8 G/DL — SIGNIFICANT CHANGE UP (ref 11.5–15.5)
IANC: 3.09 K/UL — SIGNIFICANT CHANGE UP (ref 1.8–7.4)
IMM GRANULOCYTES NFR BLD AUTO: 0.2 % — SIGNIFICANT CHANGE UP (ref 0–0.9)
LACTATE BLDV-MCNC: 1.3 MMOL/L — SIGNIFICANT CHANGE UP (ref 0.5–2)
LIDOCAIN IGE QN: 55 U/L — SIGNIFICANT CHANGE UP (ref 7–60)
LYMPHOCYTES # BLD AUTO: 1.08 K/UL — SIGNIFICANT CHANGE UP (ref 1–3.3)
LYMPHOCYTES # BLD AUTO: 23.6 % — SIGNIFICANT CHANGE UP (ref 13–44)
MCHC RBC-ENTMCNC: 30.2 PG — SIGNIFICANT CHANGE UP (ref 27–34)
MCHC RBC-ENTMCNC: 32.4 GM/DL — SIGNIFICANT CHANGE UP (ref 32–36)
MCV RBC AUTO: 93.2 FL — SIGNIFICANT CHANGE UP (ref 80–100)
MONOCYTES # BLD AUTO: 0.36 K/UL — SIGNIFICANT CHANGE UP (ref 0–0.9)
MONOCYTES NFR BLD AUTO: 7.9 % — SIGNIFICANT CHANGE UP (ref 2–14)
NEUTROPHILS # BLD AUTO: 3.09 K/UL — SIGNIFICANT CHANGE UP (ref 1.8–7.4)
NEUTROPHILS NFR BLD AUTO: 67.5 % — SIGNIFICANT CHANGE UP (ref 43–77)
NRBC # BLD: 0 /100 WBCS — SIGNIFICANT CHANGE UP (ref 0–0)
NRBC # FLD: 0 K/UL — SIGNIFICANT CHANGE UP (ref 0–0)
PCO2 BLDV: 53 MMHG — HIGH (ref 39–52)
PH BLDV: 7.38 — SIGNIFICANT CHANGE UP (ref 7.32–7.43)
PLATELET # BLD AUTO: 211 K/UL — SIGNIFICANT CHANGE UP (ref 150–400)
PO2 BLDV: 57 MMHG — HIGH (ref 25–45)
POTASSIUM BLDV-SCNC: 4.9 MMOL/L — SIGNIFICANT CHANGE UP (ref 3.5–5.1)
POTASSIUM SERPL-MCNC: 4.6 MMOL/L — SIGNIFICANT CHANGE UP (ref 3.5–5.3)
POTASSIUM SERPL-SCNC: 4.6 MMOL/L — SIGNIFICANT CHANGE UP (ref 3.5–5.3)
PROT SERPL-MCNC: 7.2 G/DL — SIGNIFICANT CHANGE UP (ref 6–8.3)
RBC # BLD: 4.24 M/UL — SIGNIFICANT CHANGE UP (ref 3.8–5.2)
RBC # FLD: 12.9 % — SIGNIFICANT CHANGE UP (ref 10.3–14.5)
SAO2 % BLDV: 85.7 % — SIGNIFICANT CHANGE UP (ref 67–88)
SODIUM SERPL-SCNC: 141 MMOL/L — SIGNIFICANT CHANGE UP (ref 135–145)
WBC # BLD: 4.58 K/UL — SIGNIFICANT CHANGE UP (ref 3.8–10.5)
WBC # FLD AUTO: 4.58 K/UL — SIGNIFICANT CHANGE UP (ref 3.8–10.5)

## 2023-05-17 PROCEDURE — 99285 EMERGENCY DEPT VISIT HI MDM: CPT

## 2023-05-17 PROCEDURE — 76705 ECHO EXAM OF ABDOMEN: CPT | Mod: 26

## 2023-05-17 RX ORDER — FAMOTIDINE 10 MG/ML
20 INJECTION INTRAVENOUS ONCE
Refills: 0 | Status: COMPLETED | OUTPATIENT
Start: 2023-05-17 | End: 2023-05-17

## 2023-05-17 RX ORDER — ACETAMINOPHEN 500 MG
1000 TABLET ORAL ONCE
Refills: 0 | Status: COMPLETED | OUTPATIENT
Start: 2023-05-17 | End: 2023-05-17

## 2023-05-17 RX ORDER — MORPHINE SULFATE 50 MG/1
2 CAPSULE, EXTENDED RELEASE ORAL ONCE
Refills: 0 | Status: DISCONTINUED | OUTPATIENT
Start: 2023-05-17 | End: 2023-05-17

## 2023-05-17 RX ORDER — SODIUM CHLORIDE 9 MG/ML
1000 INJECTION INTRAMUSCULAR; INTRAVENOUS; SUBCUTANEOUS ONCE
Refills: 0 | Status: COMPLETED | OUTPATIENT
Start: 2023-05-17 | End: 2023-05-17

## 2023-05-17 RX ADMIN — FAMOTIDINE 20 MILLIGRAM(S): 10 INJECTION INTRAVENOUS at 22:11

## 2023-05-17 RX ADMIN — Medication 400 MILLIGRAM(S): at 22:11

## 2023-05-17 RX ADMIN — SODIUM CHLORIDE 1000 MILLILITER(S): 9 INJECTION INTRAMUSCULAR; INTRAVENOUS; SUBCUTANEOUS at 22:11

## 2023-05-17 NOTE — ED PROVIDER NOTE - CLINICAL SUMMARY MEDICAL DECISION MAKING FREE TEXT BOX
[place] DO PGY-3:  68-year-old female past medical history of colon malignancy status post resection, chart review shows SBO which required surgeries presents today secondary to epigastric pain. Will obtain RUQ US. Differential diagnosis includes but not limited to GB pathology vs GERD vs pancreatitis. Pt tba for further GI eval such as MRCP

## 2023-05-17 NOTE — ED ADULT TRIAGE NOTE - CHIEF COMPLAINT QUOTE
Patient c/o abdominal pain x 4 days. Saw GI doctor today and had elevated liver enzymes, came to ED for ultrasound. Denies nausea, vomiting, fever, diarrhea, constipation, urinary symptoms. H/x colon CA

## 2023-05-17 NOTE — ED PROVIDER NOTE - PHYSICAL EXAMINATION
CONSTITUTIONAL: Well-developed; well-nourished; in no acute distress.   SKIN: warm, dry  HEAD: Normocephalic; atraumatic.  EYES: no conjunctival injection. PERRL.   ENT: No nasal discharge; airway clear.  NECK: Supple; non tender.  CARD: S1, S2 normal; Regular rate and rhythm.   RESP: No wheezes, rales or rhonchi. Good air movement bilaterally.   ABD: +epigastric ttp   EXT: Ambulates independently.  No cyanosis or edema.   NEURO: AOx3  PSYCH: Cooperative, appropriate.

## 2023-05-17 NOTE — ED PROVIDER NOTE - ATTENDING CONTRIBUTION TO CARE
67 yo female with PMH colon malignancy status post resection, SBO with surgeries for evaluation of epigastric pain x 4 days ago. Sent to ED by GI for US PE: RRR, CTA BL lungs, abd soft ND, +epigastric tender to palpation A/P Labs, imaging, medicate, reassess

## 2023-05-17 NOTE — ED ADULT NURSE REASSESSMENT NOTE - NS ED NURSE REASSESS COMMENT FT1
Received report from Day RN. Patient received in spot 22a. A&O4. Respirations even and unlabored. Family at bedside. no signs of acute distress noted. Comfort and safety maintained.

## 2023-05-17 NOTE — ED ADULT TRIAGE NOTE - TEMPERATURE IN CELSIUS (DEGREES C)
[facility], [address]  [address]  [place] [address]  [phone]      Arleen Martines :1988 MRN:[id number]    2019  Time Session Began: 10:00 am Time Session Ended: 11:00 am    Session Type:60 Minute Therapy (69444)    Others Present: NA    Intervention: Behavioral, Cognitive, Insight, Supportive, Systemic, MI     Suicide/Homicide/Violence Ideation: Pt verbalized no thoughts or feelings of SI/HI, self-harm and /or wish to die.    Current Outpatient Medications   Medication Sig   • buprenorphine-naLOXone (SUBOXONE FILM) 8-2 MG sublingual film Place 1 strip under the tongue daily.   • gabapentin (NEURONTIN) 600 MG tablet Take 1 tablet by mouth 3 times daily.   • lamoTRIgine (LAMICTAL) 100 MG tablet Take 1 tablet by mouth daily.   • Sofosbuvir-Velpatasvir (EPCLUSA) 400-100 MG Tab Take 1 tablet by mouth daily.   • medroxyPROGESTERone (DEPO-PROVERA) 150 MG/ML injectable suspension Inject 150 mg into the muscle every 3 months.   • albuterol 108 (90 Base) MCG/ACT inhaler Inhale 2 puffs into the lungs every 4 hours as needed for Shortness of Breath or Wheezing.     No current facility-administered medications for this visit.        Change in Medication(s) Reported: No      Patient/Family Education Provided: Yes  Patient/Family Displays Understanding: Yes    Chief complaint: [\"Substance Use.\"]    Progress Note containing chief complaint and symptoms/problems related to the complaint:    (Data/Action/Response/Plan)    D:Met with pt individually this morning for scheduled OP apt in the [facility] Recovery Program focusing on relapse prevention and long-term recovery maintenance. Pt checked-in, reported continued abstinence from all substances and denied any cravings or urges to use and verbalized compliance with all prescribed and OTC medications. Pt utilized session time to talk about and process her feelings with her first experience having her son  overnight for the entire weekend. Pt voiced \"it was good, but a bit stressful.\" Pt went on to state, it wasn't so much because of Yulisa, her 3 yr old son, as it was her mother over stepping her boundaries and interfering while pt's fulfilling her role and responsibility as Yulisa's mother and she's undermining, and/or outright budding in doing things how she did them when pt was a child, etc   A: Validated how pt was feeling and offered verbal praise and further encouragement for handling the situation in a calm, respectful and sober manner. Offered constructive feedback, insight and supportive therapy to pt. Discussed strategies to help pt maintain healthy, effective, and open communication skills with her mother, in addition too, establishing boundaries each time and prior to, her son coming for the weekend. Pt and provider identified personal treatment objectives and developed treatment plan to focus on while in the OP ORP.    R:Pt was alert and oriented x 4, presented with a stable, balanced mood and mildly anxious affect. Pt openly shared, effectively communicated and expressed her thoughts and feelings. Pt demonstrated good insight and judgement. Pt verbalized continued motivation for life-long sobriety and long-term recovery.    P:Pt will focus on her personal treatment objectives in outside recovery program until next session and will continue to follow-up with provider individually in the OP ORP weekly.    Need for Community Resources Assessed: Yes    Resources Needed: yes; pt was given a schedule of the [facility] for C.D.    Diagnosis: F11.20 Opioid use disorder, severe, on maintenance therapy, dependence (CMS/HCC)  (primary encounter diagnosis)  F14.20 Cocaine use disorder, severe, dependence (CMS/HCC)  F12.20 Cannabis use disorder, severe, dependence (CMS/HCC)     Treatment Plan: Treatment Plan established this visit    Discharge Plan: Recommend weekly community support groups, relapse  prevention plan, identified support network, complete abstinence     Next Appointment: 9/17/19  Radha Agustin NCC, LPC, SAC         15-Item Recovery Thermometer: (Scale: Lowest = 1: Highest = 10)      1. Having a place to live that is supportive of my recovery.10  2. Taking Care of myself and managing my day-to-day life. 9  3. Having enough money to live on, pay my bills, and meet my basic needs. 10  4. Having a job, school, volunteering, taking care of my family, or other activities that give meaning and purpose to my life. 7  5. Being someone other people can count on. 10  6. Taking a positive attitude toward myself. 5  7. Free from depression, anxiety, or strong anger. 6  8. Doing things to help myself in my recovery. 10  9. Being honest with myself. 8  10. Forgiving, accepting, and respecting myself. 7  11. Avoiding people, places, situations or things that trigger my use of alcohol or drugs. 6  12. Handling negative feelings and reacting to life's ups and downs without using alcohol or drugs. 8  13. Free from being troubled or bothered by strong urges to use alcohol or drugs. 9  14. Enjoying life without using alcohol or drugs. 10  15. Feeling that my life has value and worth. 10       36.8

## 2023-05-17 NOTE — ED ADULT NURSE NOTE - OBJECTIVE STATEMENT
Per pt's son Coy; pt was referred to rule out gallstones; pt has been having abdominal pain for four days and pt was seen three days ago; Coy reports pt has elevated liver enzymes; pt noted in pain; history of colorectal cancer.

## 2023-05-18 DIAGNOSIS — R10.13 EPIGASTRIC PAIN: ICD-10-CM

## 2023-05-18 LAB
ALBUMIN SERPL ELPH-MCNC: 3.8 G/DL — SIGNIFICANT CHANGE UP (ref 3.3–5)
ALP SERPL-CCNC: 121 U/L — HIGH (ref 40–120)
ALT FLD-CCNC: 252 U/L — HIGH (ref 4–33)
ANION GAP SERPL CALC-SCNC: 10 MMOL/L — SIGNIFICANT CHANGE UP (ref 7–14)
APTT BLD: 28.5 SEC — SIGNIFICANT CHANGE UP (ref 27–36.3)
AST SERPL-CCNC: 217 U/L — HIGH (ref 4–32)
BILIRUB SERPL-MCNC: 1 MG/DL — SIGNIFICANT CHANGE UP (ref 0.2–1.2)
BLD GP AB SCN SERPL QL: NEGATIVE — SIGNIFICANT CHANGE UP
BUN SERPL-MCNC: 9 MG/DL — SIGNIFICANT CHANGE UP (ref 7–23)
CALCIUM SERPL-MCNC: 8.9 MG/DL — SIGNIFICANT CHANGE UP (ref 8.4–10.5)
CHLORIDE SERPL-SCNC: 107 MMOL/L — SIGNIFICANT CHANGE UP (ref 98–107)
CO2 SERPL-SCNC: 26 MMOL/L — SIGNIFICANT CHANGE UP (ref 22–31)
CREAT SERPL-MCNC: 0.65 MG/DL — SIGNIFICANT CHANGE UP (ref 0.5–1.3)
EGFR: 95 ML/MIN/1.73M2 — SIGNIFICANT CHANGE UP
GLUCOSE SERPL-MCNC: 82 MG/DL — SIGNIFICANT CHANGE UP (ref 70–99)
HCT VFR BLD CALC: 36.5 % — SIGNIFICANT CHANGE UP (ref 34.5–45)
HGB BLD-MCNC: 11.8 G/DL — SIGNIFICANT CHANGE UP (ref 11.5–15.5)
INR BLD: 0.95 RATIO — SIGNIFICANT CHANGE UP (ref 0.88–1.16)
MAGNESIUM SERPL-MCNC: 2.1 MG/DL — SIGNIFICANT CHANGE UP (ref 1.6–2.6)
MCHC RBC-ENTMCNC: 31.3 PG — SIGNIFICANT CHANGE UP (ref 27–34)
MCHC RBC-ENTMCNC: 32.3 GM/DL — SIGNIFICANT CHANGE UP (ref 32–36)
MCV RBC AUTO: 96.8 FL — SIGNIFICANT CHANGE UP (ref 80–100)
NRBC # BLD: 0 /100 WBCS — SIGNIFICANT CHANGE UP (ref 0–0)
NRBC # FLD: 0 K/UL — SIGNIFICANT CHANGE UP (ref 0–0)
PHOSPHATE SERPL-MCNC: 3.2 MG/DL — SIGNIFICANT CHANGE UP (ref 2.5–4.5)
PLATELET # BLD AUTO: 183 K/UL — SIGNIFICANT CHANGE UP (ref 150–400)
POTASSIUM SERPL-MCNC: 3.7 MMOL/L — SIGNIFICANT CHANGE UP (ref 3.5–5.3)
POTASSIUM SERPL-SCNC: 3.7 MMOL/L — SIGNIFICANT CHANGE UP (ref 3.5–5.3)
PROT SERPL-MCNC: 5.8 G/DL — LOW (ref 6–8.3)
PROTHROM AB SERPL-ACNC: 11 SEC — SIGNIFICANT CHANGE UP (ref 10.5–13.4)
RBC # BLD: 3.77 M/UL — LOW (ref 3.8–5.2)
RBC # FLD: 13 % — SIGNIFICANT CHANGE UP (ref 10.3–14.5)
RH IG SCN BLD-IMP: POSITIVE — SIGNIFICANT CHANGE UP
SODIUM SERPL-SCNC: 143 MMOL/L — SIGNIFICANT CHANGE UP (ref 135–145)
WBC # BLD: 2.6 K/UL — LOW (ref 3.8–10.5)
WBC # FLD AUTO: 2.6 K/UL — LOW (ref 3.8–10.5)

## 2023-05-18 PROCEDURE — 99222 1ST HOSP IP/OBS MODERATE 55: CPT | Mod: GC

## 2023-05-18 RX ORDER — POTASSIUM CHLORIDE 20 MEQ
10 PACKET (EA) ORAL
Refills: 0 | Status: COMPLETED | OUTPATIENT
Start: 2023-05-18 | End: 2023-05-18

## 2023-05-18 RX ORDER — PIPERACILLIN AND TAZOBACTAM 4; .5 G/20ML; G/20ML
3.38 INJECTION, POWDER, LYOPHILIZED, FOR SOLUTION INTRAVENOUS EVERY 8 HOURS
Refills: 0 | Status: DISCONTINUED | OUTPATIENT
Start: 2023-05-18 | End: 2023-05-19

## 2023-05-18 RX ORDER — PIPERACILLIN AND TAZOBACTAM 4; .5 G/20ML; G/20ML
3.38 INJECTION, POWDER, LYOPHILIZED, FOR SOLUTION INTRAVENOUS ONCE
Refills: 0 | Status: COMPLETED | OUTPATIENT
Start: 2023-05-18 | End: 2023-05-18

## 2023-05-18 RX ORDER — SODIUM CHLORIDE 9 MG/ML
1000 INJECTION, SOLUTION INTRAVENOUS
Refills: 0 | Status: DISCONTINUED | OUTPATIENT
Start: 2023-05-18 | End: 2023-05-19

## 2023-05-18 RX ORDER — ENOXAPARIN SODIUM 100 MG/ML
40 INJECTION SUBCUTANEOUS EVERY 24 HOURS
Refills: 0 | Status: DISCONTINUED | OUTPATIENT
Start: 2023-05-18 | End: 2023-05-22

## 2023-05-18 RX ADMIN — Medication 100 MILLIEQUIVALENT(S): at 10:58

## 2023-05-18 RX ADMIN — PIPERACILLIN AND TAZOBACTAM 200 GRAM(S): 4; .5 INJECTION, POWDER, LYOPHILIZED, FOR SOLUTION INTRAVENOUS at 03:29

## 2023-05-18 RX ADMIN — ENOXAPARIN SODIUM 40 MILLIGRAM(S): 100 INJECTION SUBCUTANEOUS at 17:26

## 2023-05-18 RX ADMIN — PIPERACILLIN AND TAZOBACTAM 25 GRAM(S): 4; .5 INJECTION, POWDER, LYOPHILIZED, FOR SOLUTION INTRAVENOUS at 06:54

## 2023-05-18 RX ADMIN — Medication 1000 MILLIGRAM(S): at 00:05

## 2023-05-18 RX ADMIN — Medication 100 MILLIEQUIVALENT(S): at 09:16

## 2023-05-18 RX ADMIN — PIPERACILLIN AND TAZOBACTAM 25 GRAM(S): 4; .5 INJECTION, POWDER, LYOPHILIZED, FOR SOLUTION INTRAVENOUS at 13:31

## 2023-05-18 RX ADMIN — PIPERACILLIN AND TAZOBACTAM 25 GRAM(S): 4; .5 INJECTION, POWDER, LYOPHILIZED, FOR SOLUTION INTRAVENOUS at 23:09

## 2023-05-18 RX ADMIN — Medication 100 MILLIEQUIVALENT(S): at 12:10

## 2023-05-18 NOTE — CONSULT NOTE ADULT - SUBJECTIVE AND OBJECTIVE BOX
Chief Complaint:  Patient is a 68y old  Female who presents with a chief complaint of Choledocholithiasis (18 May 2023 01:54)      HPI:JOCE EMMANUEL is a 68y Female with prior rectal cancer s/p LAR with DLI s/p ileostomy reversal (2017 with Dr Moore) s/p exlap and resection of 23 cm (per path) of compromised SB for closed loop obstruction (2019) s/p chemoRT who now presents to the ED with completes of severe abdominal pain for a few days c/b nausea w/o vomiting, US 5/17 revealed cholelithiasis, CBD 1.2 cm with distal tapering, and poorly visualized pancreas. Liver chemistries significant for Bili 1 from 1.3 (previously 0.8) and , AST now 217 from 447 and  from 374.    Note patient seen in the ED 5/14 with similar abdominal pain  for 1 day that occurred with po intake. Per chart, was discharged after passing po challenge with return precautions and plan for MRI/repeat liver enzymes OP. CTAP at that time revealed CBD 1.4 cm with distal tapering on CT 5/15 revealed CBD up to 1.4 cm with distal tapering to normal caliber (stable from 2022, but new since 2020).    States she came back because the abdominal pain worsened and she found it difficult to eat. Denying diarrhea/constipation, f/c, no bloody red/black BMs. States she is in remission for her cancer.    PMHX/PSHX:  Thyroid disorder    Colorectal cancer    HTN (hypertension)    SBO (small bowel obstruction)    No significant past surgical history    History of low anterior resection of rectum    H/O ileostomy      Allergies:  No Known Allergies      Home Medications: reviewed  Hospital Medications:  enoxaparin Injectable 40 milliGRAM(s) SubCutaneous every 24 hours  lactated ringers. 1000 milliLiter(s) IV Continuous <Continuous>  piperacillin/tazobactam IVPB.. 3.375 Gram(s) IV Intermittent every 8 hours      Social History:   Tob: Denies  EtOH: Denies  Illicit Drugs: Denies    Family history:  No pertinent family history in first degree relatives      ROS:   Complete and normal except as mentioned above.    PHYSICAL EXAM:   Vital Signs:  Vital Signs Last 24 Hrs  T(C): 36.9 (18 May 2023 14:00), Max: 36.9 (18 May 2023 10:00)  T(F): 98.4 (18 May 2023 14:00), Max: 98.4 (18 May 2023 10:00)  HR: 54 (18 May 2023 14:00) (52 - 66)  BP: 138/69 (18 May 2023 14:00) (119/59 - 157/76)  BP(mean): --  RR: 18 (18 May 2023 14:00) (17 - 18)  SpO2: 100% (18 May 2023 14:00) (96% - 100%)    Parameters below as of 18 May 2023 14:00  Patient On (Oxygen Delivery Method): room air      Daily Height in cm: 157.48 (17 May 2023 16:42)    Daily     GENERAL: no acute distress  NEURO: aox3  HEENT: anicteric sclera, no conjunctival pallor appreciated  CHEST: no respiratory distress, no accessory muscle use  CARDIAC: regular rate   ABDOMEN: soft, epigastric tenderness, non-distended, no rebound or guarding  EXTREMITIES: warm, well perfused, no edema  SKIN: no lesions noted    LABS: reviewed                        11.8   2.60  )-----------( 183      ( 18 May 2023 06:34 )             36.5     05-18    143  |  107  |  9   ----------------------------<  82  3.7   |  26  |  0.65    Ca    8.9      18 May 2023 06:34  Phos  3.2     05-18  Mg     2.10     05-18    TPro  5.8<L>  /  Alb  3.8  /  TBili  1.0  /  DBili  x   /  AST  217<H>  /  ALT  252<H>  /  AlkPhos  121<H>  05-18    LIVER FUNCTIONS - ( 18 May 2023 06:34 )  Alb: 3.8 g/dL / Pro: 5.8 g/dL / ALK PHOS: 121 U/L / ALT: 252 U/L / AST: 217 U/L / GGT: x               Diagnostic Studies: see sunrise for full report

## 2023-05-18 NOTE — CONSULT NOTE ADULT - ASSESSMENT
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 Advanced GI consulted for biliary dilatation.    # Biliary dilatation possibly 2/2 choledocholithiasis vs stricture vs malignancy  - US 5/17 revealed cholelithiasis, CBD 1.2 cm with distal tapering, and poorly visualized pancreas  - CT 5/15 revealed CBD up to 1.4 cm with distal tapering to normal caliber (stable from 2022, but new since 2020)    Recommendations:  - Check MRCP today  - Tentatively plan for ERCP tomorrow  - Diet as tolerated today   - NPO after midnight  - 3 AM CBC, CMP, coags; correct electrolytes  - Transfuse if Hgb < 7  - Antiemetics as tolerated  - Adequate pain control  - Rest of care per primary    Preliminary note until signed by Attending.    Thank you for involving us in this patient's care. Please reach out if any further questions or concerns.    Dennise West MD  Gastroenterology/Hepatology Fellow, PGY-VI    NON-URGENT CONSULTS:  Please email [email or web address]  [email or web address]  AT NIGHT AND ON WEEKENDS:  Contact on-call GI fellow via answering service ([phone]) from 5pm-8am and on weekends/holidays  MONDAY-FRIDAY 8AM-5PM:  Pager# [phone] ([facility])

## 2023-05-18 NOTE — ED ADULT NURSE REASSESSMENT NOTE - NS ED NURSE REASSESS COMMENT FT1
Patient in spot 22a. A&O4. Respirations even and unlabored. No signs of acute distress noted. Surgery at bedside for consult. Comfort and safety maintained. Stretcher in lowest position.

## 2023-05-18 NOTE — H&P ADULT - BIRTH SEX
Female Alternatives Discussed Intro (Do Not Add Period): I discussed alternative treatments to Mohs surgery and specifically discussed the risks and benefits of

## 2023-05-18 NOTE — H&P ADULT - ASSESSMENT
67yo F with h/o rectal cancer s/p LAR with DLI (2017, Dr. Moore), ileostomy reversal (2017, Dr. Moore), exlap and small bowel resection for closed loop obstruction (2019, Dr. Moore) presenting for abdominal pain found to have likely choledocholithiasis without cholangitis.    PLAN:  - Admit to Dr. Moore, D team surgery, floor bed  - MRCP to eval for choledocho  - Empiric abx  - NPO/IVF  - Pain control as needed    To be discussed with Dr. Oscar Richmond, PGY3  D Team Surgery   [id number]

## 2023-05-18 NOTE — CONSULT NOTE ADULT - ATTENDING COMMENTS
Patient seen and examined with the GI fellow. I agree with the above assessment and plan. Thank you for allowing us to care for your patient. Patient seen and examined with the GI fellow. I agree with the above assessment and plan. Thank you for allowing us to care for your patient.    CBD dilation. Awaiting MRCP

## 2023-05-18 NOTE — H&P ADULT - NSHPPHYSICALEXAM_GEN_ALL_CORE
VITALS:  Vital Signs Last 24 Hrs  T(C): 36.4 (18 May 2023 00:57), Max: 36.8 (17 May 2023 16:42)  T(F): 97.5 (18 May 2023 00:57), Max: 98.2 (17 May 2023 16:42)  HR: 52 (18 May 2023 00:57) (52 - 66)  BP: 131/67 (18 May 2023 00:57) (119/59 - 157/76)  BP(mean): --  RR: 17 (18 May 2023 00:57) (17 - 18)  SpO2: 99% (18 May 2023 00:57) (99% - 100%)    Parameters below as of 17 May 2023 20:34  Patient On (Oxygen Delivery Method): room air        PHYSICAL EXAM:  Gen: No acute distress.  Abd: Soft, moderate epigastric tenderness, negative Rivera's sign, nondistended, no rebound, no guarding.  Ext: Warm and well-perfused

## 2023-05-18 NOTE — H&P ADULT - ATTENDING COMMENTS
I have reviewed the history, pertinent labs and imaging, and discussed the care with the consult resident.  More than 50% of this 50 minute encounter including face to face with the patient was spent counseling and/or coordination of care on symptomatic cholelithiasis.  Time included review of vitals, labs, imaging.    Chief complaint:  epigastric pain    The active issues are:  1. dilated CBD and elevated bilirubin concerning for choledocholithiasis  2. history of multiple previous abdominal surgeries    MRCP to further evaluate CBD.  Risks/benefits/alternatives/recovery from lap versus open cholecystectomy or medical management alone given surgical history pending CBD evaluation.    The Acute Care Surgery (B Team) Practice:    urgent issues - spectra [id number]  nonurgent issues - [phone]  patient appointments or afterhours - [phone]

## 2023-05-18 NOTE — H&P ADULT - HISTORY OF PRESENT ILLNESS
67yo F with h/o rectal cancer s/p LAR with DLI (2017, Dr. Moore), ileostomy reversal (2017, Dr. Moore), exlap and small bowel resection for closed loop obstruction (2019, Dr. Moore) presenting for abdominal pain. Patient reports onset of epigastric pain on Sat night associated with mild nausea but no vomiting. Denies fevers, chills or diarrhea. She presented to the ED on Sunday where labs were notable for a transaminitis but WBC and tbili normal. CT showed CBD dilation to 1.4cm with distal tapering to normal caliber.      In the ED, vitals wnl. Labs showed WBC 4.6, tbili rise to 1.3, LFTs 447/374. RUQ US showed CBD 1.2cm.

## 2023-05-18 NOTE — H&P ADULT - NSHPLABSRESULTS_GEN_ALL_CORE
LABS:                          12.8   4.58  )-----------( 211      ( 17 May 2023 22:08 )             39.5       05-17    141  |  103  |  10  ----------------------------<  94  4.6   |  25  |  0.62    Ca    9.8      17 May 2023 22:08    TPro  7.2  /  Alb  4.5  /  TBili  1.3<H>  /  DBili  x   /  AST  447<H>  /  ALT  374<H>  /  AlkPhos  161<H>  05-17            Culture Results:   <10,000 CFU/mL Normal Urogenital Cindy (05-15 @ 00:57)      _______________________________________  RADIOLOGY & ADDITIONAL STUDIES:    < from: US Abdomen Upper Quadrant Right (05.17.23 @ 22:00) >    Cholelithiasis without sonographic evidence for acute cholecystitis.    CBD dilatation up to 1.2 cm with distal tapering, corresponding to CT   findings. Consider further workup with nonemergent MRI/MRCP.    < end of copied text >  =====  < from: CT Abdomen and Pelvis w/ IV Cont (05.15.23 @ 01:02) >    IMPRESSION:  No evidence of bowel obstruction.    Mild wall thickening of the ascending colon. Findings may reflect   underdistention or colitis.    CBD distention up to 1.4 cm with distal tapering to normal caliber,   stable since 2022 but new since 2020. Nonurgent follow-up MRI can be   obtained for further evaluation. It is    < end of copied text >

## 2023-05-19 LAB
ALBUMIN SERPL ELPH-MCNC: 3.4 G/DL — SIGNIFICANT CHANGE UP (ref 3.3–5)
ALP SERPL-CCNC: 97 U/L — SIGNIFICANT CHANGE UP (ref 40–120)
ALT FLD-CCNC: 172 U/L — HIGH (ref 4–33)
ANION GAP SERPL CALC-SCNC: 14 MMOL/L — SIGNIFICANT CHANGE UP (ref 7–14)
APTT BLD: 32.4 SEC — SIGNIFICANT CHANGE UP (ref 27–36.3)
AST SERPL-CCNC: 109 U/L — HIGH (ref 4–32)
BILIRUB SERPL-MCNC: 1 MG/DL — SIGNIFICANT CHANGE UP (ref 0.2–1.2)
BUN SERPL-MCNC: 9 MG/DL — SIGNIFICANT CHANGE UP (ref 7–23)
CALCIUM SERPL-MCNC: 8.8 MG/DL — SIGNIFICANT CHANGE UP (ref 8.4–10.5)
CHLORIDE SERPL-SCNC: 101 MMOL/L — SIGNIFICANT CHANGE UP (ref 98–107)
CO2 SERPL-SCNC: 23 MMOL/L — SIGNIFICANT CHANGE UP (ref 22–31)
CREAT SERPL-MCNC: 0.66 MG/DL — SIGNIFICANT CHANGE UP (ref 0.5–1.3)
EGFR: 95 ML/MIN/1.73M2 — SIGNIFICANT CHANGE UP
GLUCOSE BLDC GLUCOMTR-MCNC: 58 MG/DL — LOW (ref 70–99)
GLUCOSE BLDC GLUCOMTR-MCNC: 59 MG/DL — LOW (ref 70–99)
GLUCOSE BLDC GLUCOMTR-MCNC: 96 MG/DL — SIGNIFICANT CHANGE UP (ref 70–99)
GLUCOSE SERPL-MCNC: 51 MG/DL — CRITICAL LOW (ref 70–99)
HAV IGM SER-ACNC: SIGNIFICANT CHANGE UP
HBV CORE IGM SER-ACNC: SIGNIFICANT CHANGE UP
HBV SURFACE AG SER-ACNC: SIGNIFICANT CHANGE UP
HCT VFR BLD CALC: 36.5 % — SIGNIFICANT CHANGE UP (ref 34.5–45)
HCV AB S/CO SERPL IA: 0.11 S/CO — SIGNIFICANT CHANGE UP (ref 0–0.99)
HCV AB SERPL-IMP: SIGNIFICANT CHANGE UP
HGB BLD-MCNC: 11.9 G/DL — SIGNIFICANT CHANGE UP (ref 11.5–15.5)
INR BLD: 0.97 RATIO — SIGNIFICANT CHANGE UP (ref 0.88–1.16)
MAGNESIUM SERPL-MCNC: 1.9 MG/DL — SIGNIFICANT CHANGE UP (ref 1.6–2.6)
MCHC RBC-ENTMCNC: 31.4 PG — SIGNIFICANT CHANGE UP (ref 27–34)
MCHC RBC-ENTMCNC: 32.6 GM/DL — SIGNIFICANT CHANGE UP (ref 32–36)
MCV RBC AUTO: 96.3 FL — SIGNIFICANT CHANGE UP (ref 80–100)
NRBC # BLD: 0 /100 WBCS — SIGNIFICANT CHANGE UP (ref 0–0)
NRBC # FLD: 0 K/UL — SIGNIFICANT CHANGE UP (ref 0–0)
PHOSPHATE SERPL-MCNC: 3.4 MG/DL — SIGNIFICANT CHANGE UP (ref 2.5–4.5)
PLATELET # BLD AUTO: 175 K/UL — SIGNIFICANT CHANGE UP (ref 150–400)
POTASSIUM SERPL-MCNC: 3.5 MMOL/L — SIGNIFICANT CHANGE UP (ref 3.5–5.3)
POTASSIUM SERPL-SCNC: 3.5 MMOL/L — SIGNIFICANT CHANGE UP (ref 3.5–5.3)
PROT SERPL-MCNC: 5.8 G/DL — LOW (ref 6–8.3)
PROTHROM AB SERPL-ACNC: 11.2 SEC — SIGNIFICANT CHANGE UP (ref 10.5–13.4)
RBC # BLD: 3.79 M/UL — LOW (ref 3.8–5.2)
RBC # FLD: 12.7 % — SIGNIFICANT CHANGE UP (ref 10.3–14.5)
SODIUM SERPL-SCNC: 138 MMOL/L — SIGNIFICANT CHANGE UP (ref 135–145)
WBC # BLD: 2.8 K/UL — LOW (ref 3.8–10.5)
WBC # FLD AUTO: 2.8 K/UL — LOW (ref 3.8–10.5)

## 2023-05-19 RX ORDER — DEXTROSE 50 % IN WATER 50 %
15 SYRINGE (ML) INTRAVENOUS ONCE
Refills: 0 | Status: COMPLETED | OUTPATIENT
Start: 2023-05-19 | End: 2023-05-19

## 2023-05-19 RX ORDER — DEXTROSE MONOHYDRATE, SODIUM CHLORIDE, AND POTASSIUM CHLORIDE 50; .745; 4.5 G/1000ML; G/1000ML; G/1000ML
1000 INJECTION, SOLUTION INTRAVENOUS
Refills: 0 | Status: DISCONTINUED | OUTPATIENT
Start: 2023-05-19 | End: 2023-05-23

## 2023-05-19 RX ORDER — HYDROMORPHONE HYDROCHLORIDE 2 MG/ML
0.25 INJECTION INTRAMUSCULAR; INTRAVENOUS; SUBCUTANEOUS EVERY 4 HOURS
Refills: 0 | Status: DISCONTINUED | OUTPATIENT
Start: 2023-05-19 | End: 2023-05-23

## 2023-05-19 RX ORDER — HYDROMORPHONE HYDROCHLORIDE 2 MG/ML
0.5 INJECTION INTRAMUSCULAR; INTRAVENOUS; SUBCUTANEOUS EVERY 4 HOURS
Refills: 0 | Status: DISCONTINUED | OUTPATIENT
Start: 2023-05-19 | End: 2023-05-23

## 2023-05-19 RX ORDER — ONDANSETRON 8 MG/1
4 TABLET, FILM COATED ORAL EVERY 4 HOURS
Refills: 0 | Status: DISCONTINUED | OUTPATIENT
Start: 2023-05-19 | End: 2023-05-23

## 2023-05-19 RX ORDER — ACETAMINOPHEN 500 MG
1000 TABLET ORAL EVERY 6 HOURS
Refills: 0 | Status: COMPLETED | OUTPATIENT
Start: 2023-05-19 | End: 2023-05-20

## 2023-05-19 RX ADMIN — Medication 15 GRAM(S): at 08:32

## 2023-05-19 RX ADMIN — Medication 400 MILLIGRAM(S): at 18:00

## 2023-05-19 RX ADMIN — PIPERACILLIN AND TAZOBACTAM 25 GRAM(S): 4; .5 INJECTION, POWDER, LYOPHILIZED, FOR SOLUTION INTRAVENOUS at 06:17

## 2023-05-19 RX ADMIN — Medication 1000 MILLIGRAM(S): at 18:48

## 2023-05-19 RX ADMIN — PIPERACILLIN AND TAZOBACTAM 25 GRAM(S): 4; .5 INJECTION, POWDER, LYOPHILIZED, FOR SOLUTION INTRAVENOUS at 13:21

## 2023-05-19 RX ADMIN — ONDANSETRON 4 MILLIGRAM(S): 8 TABLET, FILM COATED ORAL at 18:05

## 2023-05-19 RX ADMIN — DEXTROSE MONOHYDRATE, SODIUM CHLORIDE, AND POTASSIUM CHLORIDE 125 MILLILITER(S): 50; .745; 4.5 INJECTION, SOLUTION INTRAVENOUS at 13:22

## 2023-05-19 NOTE — PROGRESS NOTE ADULT - SUBJECTIVE AND OBJECTIVE BOX
TEAM [ B ] Surgery Daily Progress Note  =====================================================    SUBJECTIVE: Patient seen and examined at bedside on AM rounds. Patient complaining of ruq/epigastric pain. Denies nausea,vomiting.     ALLERGIES:  No Known Allergies    --------------------------------------------------------------------------------------    MEDICATIONS:    Gastrointestinal Medications  dextrose 5% + sodium chloride 0.45% with potassium chloride 20 mEq/L 1000 milliLiter(s) IV Continuous <Continuous>    Hematologic/Oncologic Medications  enoxaparin Injectable 40 milliGRAM(s) SubCutaneous every 24 hours    Antimicrobial/Immunologic Medications  piperacillin/tazobactam IVPB.. 3.375 Gram(s) IV Intermittent every 8 hours      --------------------------------------------------------------------------------------    VITAL SIGNS:  T(C): 37 (05-19-23 @ 10:37), Max: 37 (05-19-23 @ 10:00)  HR: 54 (05-19-23 @ 10:37) (54 - 61)  BP: 113/55 (05-19-23 @ 10:37) (113/55 - 139/55)  RR: 17 (05-19-23 @ 10:37) (17 - 18)  SpO2: 100% (05-19-23 @ 10:37) (98% - 100%)  --------------------------------------------------------------------------------------    EXAM    General: NAD, resting in bed comfortably.  Cardiac: regular rate, warm and well perfused  Respiratory: Nonlabored respirations, normal cw expansion.  Abdomen: soft, nontender, nondistended, tendenress in the RUQ   Extremities: normal strength, FROM, no deformities    --------------------------------------------------------------------------------------      INS AND OUTS:    05-18-23 @ 07:01  -  05-19-23 @ 07:00  --------------------------------------------------------  IN: 625 mL / OUT: 950 mL / NET: -325 mL      --------------------------------------------------------------------------------------

## 2023-05-19 NOTE — PROVIDER CONTACT NOTE (HYPOGLYCEMIA EVENT) - NS PROVIDER CONTACT BACKGROUND-HYPO
Age: 68y    Gender: Female    POCT Blood Glucose:  96 mg/dL (05-19-23 @ 08:07)  59 mg/dL (05-19-23 @ 07:30)  58 mg/dL (05-19-23 @ 07:25)      eMAR:dextrose Oral Gel   15 Gram(s) Oral (05-19-23 @ 08:32)

## 2023-05-19 NOTE — PROGRESS NOTE ADULT - ASSESSMENT
67yo F with h/o rectal cancer s/p LAR with DLI (2017, Dr. Moore), ileostomy reversal (2017, Dr. Moore), exlap and small bowel resection for closed loop obstruction (2019, Dr. Moore) presenting for abdominal pain found to have likely choledocholithiasis without cholangitis.    PLAN:  - possible ERCP today  - MRCP to eval for choledocho  - Empiric abx  - NPO/IVF  - Pain control as needed     B TEAM surgery  [id number]

## 2023-05-20 LAB
ALBUMIN SERPL ELPH-MCNC: 3.7 G/DL — SIGNIFICANT CHANGE UP (ref 3.3–5)
ALP SERPL-CCNC: 89 U/L — SIGNIFICANT CHANGE UP (ref 40–120)
ALT FLD-CCNC: 141 U/L — HIGH (ref 4–33)
ANION GAP SERPL CALC-SCNC: 6 MMOL/L — LOW (ref 7–14)
APTT BLD: 29.1 SEC — SIGNIFICANT CHANGE UP (ref 27–36.3)
AST SERPL-CCNC: 69 U/L — HIGH (ref 4–32)
BILIRUB SERPL-MCNC: 0.6 MG/DL — SIGNIFICANT CHANGE UP (ref 0.2–1.2)
BUN SERPL-MCNC: 4 MG/DL — LOW (ref 7–23)
CALCIUM SERPL-MCNC: 9 MG/DL — SIGNIFICANT CHANGE UP (ref 8.4–10.5)
CHLORIDE SERPL-SCNC: 105 MMOL/L — SIGNIFICANT CHANGE UP (ref 98–107)
CO2 SERPL-SCNC: 28 MMOL/L — SIGNIFICANT CHANGE UP (ref 22–31)
CREAT SERPL-MCNC: 0.61 MG/DL — SIGNIFICANT CHANGE UP (ref 0.5–1.3)
EGFR: 97 ML/MIN/1.73M2 — SIGNIFICANT CHANGE UP
GLUCOSE SERPL-MCNC: 118 MG/DL — HIGH (ref 70–99)
HCT VFR BLD CALC: 37.5 % — SIGNIFICANT CHANGE UP (ref 34.5–45)
HGB BLD-MCNC: 12.2 G/DL — SIGNIFICANT CHANGE UP (ref 11.5–15.5)
INR BLD: 0.96 RATIO — SIGNIFICANT CHANGE UP (ref 0.88–1.16)
MAGNESIUM SERPL-MCNC: 2.1 MG/DL — SIGNIFICANT CHANGE UP (ref 1.6–2.6)
MCHC RBC-ENTMCNC: 30.7 PG — SIGNIFICANT CHANGE UP (ref 27–34)
MCHC RBC-ENTMCNC: 32.5 GM/DL — SIGNIFICANT CHANGE UP (ref 32–36)
MCV RBC AUTO: 94.5 FL — SIGNIFICANT CHANGE UP (ref 80–100)
NRBC # BLD: 0 /100 WBCS — SIGNIFICANT CHANGE UP (ref 0–0)
NRBC # FLD: 0 K/UL — SIGNIFICANT CHANGE UP (ref 0–0)
PHOSPHATE SERPL-MCNC: 2.8 MG/DL — SIGNIFICANT CHANGE UP (ref 2.5–4.5)
PLATELET # BLD AUTO: 192 K/UL — SIGNIFICANT CHANGE UP (ref 150–400)
POTASSIUM SERPL-MCNC: 3.6 MMOL/L — SIGNIFICANT CHANGE UP (ref 3.5–5.3)
POTASSIUM SERPL-SCNC: 3.6 MMOL/L — SIGNIFICANT CHANGE UP (ref 3.5–5.3)
PROT SERPL-MCNC: 6 G/DL — SIGNIFICANT CHANGE UP (ref 6–8.3)
PROTHROM AB SERPL-ACNC: 11.1 SEC — SIGNIFICANT CHANGE UP (ref 10.5–13.4)
RBC # BLD: 3.97 M/UL — SIGNIFICANT CHANGE UP (ref 3.8–5.2)
RBC # FLD: 12.8 % — SIGNIFICANT CHANGE UP (ref 10.3–14.5)
SODIUM SERPL-SCNC: 139 MMOL/L — SIGNIFICANT CHANGE UP (ref 135–145)
WBC # BLD: 2.08 K/UL — LOW (ref 3.8–10.5)
WBC # FLD AUTO: 2.08 K/UL — LOW (ref 3.8–10.5)

## 2023-05-20 PROCEDURE — 99232 SBSQ HOSP IP/OBS MODERATE 35: CPT | Mod: GC

## 2023-05-20 PROCEDURE — 74183 MRI ABD W/O CNTR FLWD CNTR: CPT | Mod: 26

## 2023-05-20 RX ORDER — POTASSIUM PHOSPHATE, MONOBASIC POTASSIUM PHOSPHATE, DIBASIC 236; 224 MG/ML; MG/ML
30 INJECTION, SOLUTION INTRAVENOUS ONCE
Refills: 0 | Status: DISCONTINUED | OUTPATIENT
Start: 2023-05-20 | End: 2023-05-20

## 2023-05-20 RX ORDER — CALCIUM CARBONATE 500(1250)
1 TABLET ORAL ONCE
Refills: 0 | Status: COMPLETED | OUTPATIENT
Start: 2023-05-20 | End: 2023-05-20

## 2023-05-20 RX ORDER — POTASSIUM PHOSPHATE, MONOBASIC POTASSIUM PHOSPHATE, DIBASIC 236; 224 MG/ML; MG/ML
15 INJECTION, SOLUTION INTRAVENOUS ONCE
Refills: 0 | Status: COMPLETED | OUTPATIENT
Start: 2023-05-20 | End: 2023-05-20

## 2023-05-20 RX ORDER — ACETAMINOPHEN 500 MG
1000 TABLET ORAL EVERY 6 HOURS
Refills: 0 | Status: COMPLETED | OUTPATIENT
Start: 2023-05-20 | End: 2023-05-21

## 2023-05-20 RX ADMIN — ENOXAPARIN SODIUM 40 MILLIGRAM(S): 100 INJECTION SUBCUTANEOUS at 00:15

## 2023-05-20 RX ADMIN — POTASSIUM PHOSPHATE, MONOBASIC POTASSIUM PHOSPHATE, DIBASIC 62.5 MILLIMOLE(S): 236; 224 INJECTION, SOLUTION INTRAVENOUS at 11:09

## 2023-05-20 RX ADMIN — DEXTROSE MONOHYDRATE, SODIUM CHLORIDE, AND POTASSIUM CHLORIDE 125 MILLILITER(S): 50; .745; 4.5 INJECTION, SOLUTION INTRAVENOUS at 19:59

## 2023-05-20 RX ADMIN — Medication 400 MILLIGRAM(S): at 22:42

## 2023-05-20 RX ADMIN — Medication 1000 MILLIGRAM(S): at 23:00

## 2023-05-20 RX ADMIN — ENOXAPARIN SODIUM 40 MILLIGRAM(S): 100 INJECTION SUBCUTANEOUS at 19:08

## 2023-05-20 RX ADMIN — Medication 1000 MILLIGRAM(S): at 14:50

## 2023-05-20 RX ADMIN — Medication 400 MILLIGRAM(S): at 14:30

## 2023-05-20 RX ADMIN — DEXTROSE MONOHYDRATE, SODIUM CHLORIDE, AND POTASSIUM CHLORIDE 125 MILLILITER(S): 50; .745; 4.5 INJECTION, SOLUTION INTRAVENOUS at 09:35

## 2023-05-20 NOTE — PROGRESS NOTE ADULT - ASSESSMENT
69yo F with h/o rectal cancer s/p LAR with DLI (2017, Dr. Moore), ileostomy reversal (2017, Dr. Moore), exlap and small bowel resection for closed loop obstruction (2019, Dr. Moore) presenting for abdominal pain found to have likely choledocholithiasis without cholangitis.    PLAN:  - MRCP to eval for choledocho  - Empiric abx  - NPO/IVF  - Pain control as needed     B TEAM surgery  [id number] 67yo F with h/o rectal cancer s/p LAR with DLI (2017, Dr. Moore), ileostomy reversal (2017, Dr. Moore), exlap and small bowel resection for closed loop obstruction (2019, Dr. Moore) presenting for abdominal pain found to have likely choledocholithiasis without cholangitis.    PLAN:  - MRCP to eval for choledocho  - Continue Empiric abx  - NPO/IVF  - Pain control as needed     B TEAM surgery  [id number]

## 2023-05-20 NOTE — PROGRESS NOTE ADULT - SUBJECTIVE AND OBJECTIVE BOX
TEAM Surgery Progress Note  Patient is a 68y old  Female who presents with a chief complaint of Choledocholithiasis (19 May 2023 10:46)      INTERVAL EVENTS: Patient is POD# ***No acute events overnight.  SUBJECTIVE: Patient seen and examined at bedside with surgical team, patient without complaints. Denies fever, chills, CP, SOB nausea, vomiting, abdominal pain.    REVIEW OF SYSTEMS:  Constitutional: No fevers or chills. No malaise or weakness.  EENT: No vision changes. No ear pain. No nasal congestion or rhinitis. No throat pain or dysphagia.  Respiratory: No cough, wheezing, or SOB. No hemoptysis.  Cardiovascular: No chest pain or palpitations.  Gastrointestinal: No abdominal pain. No nausea, vomiting, diarrhea or constipation. No hematochezia. No melena.  Genitourinary: No dysuria, hematuria, or frequency.  Neurologic: No numbness or tingling. No weakness.  Skin: No rashes or pruritus.     OBJECTIVE:    Vital Signs Last 24 Hrs  T(C): 36.4 (20 May 2023 02:14), Max: 37 (19 May 2023 10:00)  T(F): 97.6 (20 May 2023 02:14), Max: 98.6 (19 May 2023 10:00)  HR: 47 (20 May 2023 02:14) (47 - 61)  BP: 136/61 (20 May 2023 02:14) (113/55 - 152/69)  BP(mean): --  RR: 16 (20 May 2023 02:14) (16 - 18)  SpO2: 99% (20 May 2023 02:14) (99% - 100%)    Parameters below as of 20 May 2023 02:14  Patient On (Oxygen Delivery Method): room air    I&O's Detail    18 May 2023 07:01  -  19 May 2023 07:00  --------------------------------------------------------  IN:    Lactated Ringers: 625 mL  Total IN: 625 mL    OUT:    Oral Fluid: 0 mL    Voided (mL): 950 mL  Total OUT: 950 mL    Total NET: -325 mL      19 May 2023 07:01  -  20 May 2023 02:34  --------------------------------------------------------  IN:    dextrose 5% + sodium chloride 0.45% w/ Additives: 1500 mL  Total IN: 1500 mL    OUT:    Oral Fluid: 0 mL    Voided (mL): 300 mL  Total OUT: 300 mL    Total NET: 1200 mL      MEDICATIONS  (STANDING):  acetaminophen   IVPB .. 1000 milliGRAM(s) IV Intermittent every 6 hours  dextrose 5% + sodium chloride 0.45% with potassium chloride 20 mEq/L 1000 milliLiter(s) (125 mL/Hr) IV Continuous <Continuous>  enoxaparin Injectable 40 milliGRAM(s) SubCutaneous every 24 hours    MEDICATIONS  (PRN):  HYDROmorphone  Injectable 0.5 milliGRAM(s) IV Push every 4 hours PRN Severe Pain (7 - 10)  HYDROmorphone  Injectable 0.25 milliGRAM(s) IV Push every 4 hours PRN Moderate Pain (4 - 6)  ondansetron Injectable 4 milliGRAM(s) IV Push every 4 hours PRN Nausea and/or Vomiting      PHYSICAL EXAM:  Constitutional: A&Ox3, NAD  Respiratory: Unlabored breathing  Abdomen: Soft, nondistended, NTTP. No rebound or guarding.  Extremities: WWP, GILLESPIE spontaneously    LABS:                        11.9   2.80  )-----------( 175      ( 19 May 2023 06:38 )             36.5     05-19    138  |  101  |  9   ----------------------------<  51<LL>  3.5   |  23  |  0.66    Ca    8.8      19 May 2023 06:38  Phos  3.4     05-19  Mg     1.90     05-19    TPro  5.8<L>  /  Alb  3.4  /  TBili  1.0  /  DBili  x   /  AST  109<H>  /  ALT  172<H>  /  AlkPhos  97  05-19    PT/INR - ( 19 May 2023 06:38 )   PT: 11.2 sec;   INR: 0.97 ratio         PTT - ( 19 May 2023 06:38 )  PTT:32.4 sec  LIVER FUNCTIONS - ( 19 May 2023 06:38 )  Alb: 3.4 g/dL / Pro: 5.8 g/dL / ALK PHOS: 97 U/L / ALT: 172 U/L / AST: 109 U/L / GGT: x                 IMAGING:     TEAM Surgery Progress Note  Patient is a 68y old  Female who presents with a chief complaint of Choledocholithiasis (19 May 2023 10:46)      INTERVAL EVENTS: No acute events overnight.  SUBJECTIVE: Patient seen and examined at bedside with surgical team, patient without complaints. Denies fever, chills, CP, SOB nausea, vomiting, abdominal pain.    REVIEW OF SYSTEMS:  Constitutional: No fevers or chills. No malaise or weakness.  EENT: No vision changes. No ear pain. No nasal congestion or rhinitis. No throat pain or dysphagia.  Respiratory: No cough, wheezing, or SOB. No hemoptysis.  Cardiovascular: No chest pain or palpitations.  Gastrointestinal: No abdominal pain. No nausea, vomiting, diarrhea or constipation. No hematochezia. No melena.  Genitourinary: No dysuria, hematuria, or frequency.  Neurologic: No numbness or tingling. No weakness.  Skin: No rashes or pruritus.     OBJECTIVE:    Vital Signs Last 24 Hrs  T(C): 36.4 (20 May 2023 02:14), Max: 37 (19 May 2023 10:00)  T(F): 97.6 (20 May 2023 02:14), Max: 98.6 (19 May 2023 10:00)  HR: 47 (20 May 2023 02:14) (47 - 61)  BP: 136/61 (20 May 2023 02:14) (113/55 - 152/69)  BP(mean): --  RR: 16 (20 May 2023 02:14) (16 - 18)  SpO2: 99% (20 May 2023 02:14) (99% - 100%)    Parameters below as of 20 May 2023 02:14  Patient On (Oxygen Delivery Method): room air    I&O's Detail    18 May 2023 07:01  -  19 May 2023 07:00  --------------------------------------------------------  IN:    Lactated Ringers: 625 mL  Total IN: 625 mL    OUT:    Oral Fluid: 0 mL    Voided (mL): 950 mL  Total OUT: 950 mL    Total NET: -325 mL      19 May 2023 07:01  -  20 May 2023 02:34  --------------------------------------------------------  IN:    dextrose 5% + sodium chloride 0.45% w/ Additives: 1500 mL  Total IN: 1500 mL    OUT:    Oral Fluid: 0 mL    Voided (mL): 300 mL  Total OUT: 300 mL    Total NET: 1200 mL      MEDICATIONS  (STANDING):  acetaminophen   IVPB .. 1000 milliGRAM(s) IV Intermittent every 6 hours  dextrose 5% + sodium chloride 0.45% with potassium chloride 20 mEq/L 1000 milliLiter(s) (125 mL/Hr) IV Continuous <Continuous>  enoxaparin Injectable 40 milliGRAM(s) SubCutaneous every 24 hours    MEDICATIONS  (PRN):  HYDROmorphone  Injectable 0.5 milliGRAM(s) IV Push every 4 hours PRN Severe Pain (7 - 10)  HYDROmorphone  Injectable 0.25 milliGRAM(s) IV Push every 4 hours PRN Moderate Pain (4 - 6)  ondansetron Injectable 4 milliGRAM(s) IV Push every 4 hours PRN Nausea and/or Vomiting      PHYSICAL EXAM:  Constitutional: A&Ox3, NAD  Respiratory: Unlabored breathing  Abdomen: Soft, nondistended, NTTP. No rebound or guarding.  Extremities: WWP, GILLESPIE spontaneously    LABS:                        11.9   2.80  )-----------( 175      ( 19 May 2023 06:38 )             36.5     05-19    138  |  101  |  9   ----------------------------<  51<LL>  3.5   |  23  |  0.66    Ca    8.8      19 May 2023 06:38  Phos  3.4     05-19  Mg     1.90     05-19    TPro  5.8<L>  /  Alb  3.4  /  TBili  1.0  /  DBili  x   /  AST  109<H>  /  ALT  172<H>  /  AlkPhos  97  05-19    PT/INR - ( 19 May 2023 06:38 )   PT: 11.2 sec;   INR: 0.97 ratio         PTT - ( 19 May 2023 06:38 )  PTT:32.4 sec  LIVER FUNCTIONS - ( 19 May 2023 06:38 )  Alb: 3.4 g/dL / Pro: 5.8 g/dL / ALK PHOS: 97 U/L / ALT: 172 U/L / AST: 109 U/L / GGT: x                 IMAGING:     TEAM Surgery Progress Note  Patient is a 68y old  Female who presents with a chief complaint of Choledocholithiasis (19 May 2023 10:46)      INTERVAL EVENTS: No acute events overnight.  SUBJECTIVE: Patient seen and examined at bedside with surgical team, patient complaints of Abdominal pain. Denies fever, chills, CP, SOB nausea, vomiting, abdominal pain.    REVIEW OF SYSTEMS:  Constitutional: No fevers or chills. No malaise or weakness.  EENT: No vision changes. No ear pain. No nasal congestion or rhinitis. No throat pain or dysphagia.  Respiratory: No cough, wheezing, or SOB. No hemoptysis.  Cardiovascular: No chest pain or palpitations.  Gastrointestinal: No abdominal pain. No nausea, vomiting, diarrhea or constipation. No hematochezia. No melena.  Genitourinary: No dysuria, hematuria, or frequency.  Neurologic: No numbness or tingling. No weakness.  Skin: No rashes or pruritus.     OBJECTIVE:    Vital Signs Last 24 Hrs  T(C): 36.4 (20 May 2023 02:14), Max: 37 (19 May 2023 10:00)  T(F): 97.6 (20 May 2023 02:14), Max: 98.6 (19 May 2023 10:00)  HR: 47 (20 May 2023 02:14) (47 - 61)  BP: 136/61 (20 May 2023 02:14) (113/55 - 152/69)  BP(mean): --  RR: 16 (20 May 2023 02:14) (16 - 18)  SpO2: 99% (20 May 2023 02:14) (99% - 100%)    Parameters below as of 20 May 2023 02:14  Patient On (Oxygen Delivery Method): room air    I&O's Detail    18 May 2023 07:01  -  19 May 2023 07:00  --------------------------------------------------------  IN:    Lactated Ringers: 625 mL  Total IN: 625 mL    OUT:    Oral Fluid: 0 mL    Voided (mL): 950 mL  Total OUT: 950 mL    Total NET: -325 mL      19 May 2023 07:01  -  20 May 2023 02:34  --------------------------------------------------------  IN:    dextrose 5% + sodium chloride 0.45% w/ Additives: 1500 mL  Total IN: 1500 mL    OUT:    Oral Fluid: 0 mL    Voided (mL): 300 mL  Total OUT: 300 mL    Total NET: 1200 mL      MEDICATIONS  (STANDING):  acetaminophen   IVPB .. 1000 milliGRAM(s) IV Intermittent every 6 hours  dextrose 5% + sodium chloride 0.45% with potassium chloride 20 mEq/L 1000 milliLiter(s) (125 mL/Hr) IV Continuous <Continuous>  enoxaparin Injectable 40 milliGRAM(s) SubCutaneous every 24 hours    MEDICATIONS  (PRN):  HYDROmorphone  Injectable 0.5 milliGRAM(s) IV Push every 4 hours PRN Severe Pain (7 - 10)  HYDROmorphone  Injectable 0.25 milliGRAM(s) IV Push every 4 hours PRN Moderate Pain (4 - 6)  ondansetron Injectable 4 milliGRAM(s) IV Push every 4 hours PRN Nausea and/or Vomiting      PHYSICAL EXAM:  Constitutional: A&Ox3, NAD  Respiratory: Unlabored breathing  Abdomen: Soft, nondistended, Tender to palpation on RUQ. No rebound or guarding.  Extremities: WWP, GILLESPIE spontaneously    LABS:                        11.9   2.80  )-----------( 175      ( 19 May 2023 06:38 )             36.5     05-19    138  |  101  |  9   ----------------------------<  51<LL>  3.5   |  23  |  0.66    Ca    8.8      19 May 2023 06:38  Phos  3.4     05-19  Mg     1.90     05-19    TPro  5.8<L>  /  Alb  3.4  /  TBili  1.0  /  DBili  x   /  AST  109<H>  /  ALT  172<H>  /  AlkPhos  97  05-19    PT/INR - ( 19 May 2023 06:38 )   PT: 11.2 sec;   INR: 0.97 ratio         PTT - ( 19 May 2023 06:38 )  PTT:32.4 sec  LIVER FUNCTIONS - ( 19 May 2023 06:38 )  Alb: 3.4 g/dL / Pro: 5.8 g/dL / ALK PHOS: 97 U/L / ALT: 172 U/L / AST: 109 U/L / GGT: x                 IMAGING:

## 2023-05-21 LAB
ALBUMIN SERPL ELPH-MCNC: 3.7 G/DL — SIGNIFICANT CHANGE UP (ref 3.3–5)
ALP SERPL-CCNC: 75 U/L — SIGNIFICANT CHANGE UP (ref 40–120)
ALT FLD-CCNC: 117 U/L — HIGH (ref 4–33)
ANION GAP SERPL CALC-SCNC: 9 MMOL/L — SIGNIFICANT CHANGE UP (ref 7–14)
APTT BLD: 27.8 SEC — SIGNIFICANT CHANGE UP (ref 27–36.3)
AST SERPL-CCNC: 67 U/L — HIGH (ref 4–32)
BILIRUB SERPL-MCNC: 0.5 MG/DL — SIGNIFICANT CHANGE UP (ref 0.2–1.2)
BLD GP AB SCN SERPL QL: NEGATIVE — SIGNIFICANT CHANGE UP
BUN SERPL-MCNC: 3 MG/DL — LOW (ref 7–23)
CALCIUM SERPL-MCNC: 9.3 MG/DL — SIGNIFICANT CHANGE UP (ref 8.4–10.5)
CHLORIDE SERPL-SCNC: 106 MMOL/L — SIGNIFICANT CHANGE UP (ref 98–107)
CO2 SERPL-SCNC: 26 MMOL/L — SIGNIFICANT CHANGE UP (ref 22–31)
CREAT SERPL-MCNC: 0.58 MG/DL — SIGNIFICANT CHANGE UP (ref 0.5–1.3)
EGFR: 98 ML/MIN/1.73M2 — SIGNIFICANT CHANGE UP
GLUCOSE SERPL-MCNC: 114 MG/DL — HIGH (ref 70–99)
HCT VFR BLD CALC: 35.5 % — SIGNIFICANT CHANGE UP (ref 34.5–45)
HGB BLD-MCNC: 11.6 G/DL — SIGNIFICANT CHANGE UP (ref 11.5–15.5)
INR BLD: 0.98 RATIO — SIGNIFICANT CHANGE UP (ref 0.88–1.16)
MAGNESIUM SERPL-MCNC: 1.9 MG/DL — SIGNIFICANT CHANGE UP (ref 1.6–2.6)
MCHC RBC-ENTMCNC: 31.1 PG — SIGNIFICANT CHANGE UP (ref 27–34)
MCHC RBC-ENTMCNC: 32.7 GM/DL — SIGNIFICANT CHANGE UP (ref 32–36)
MCV RBC AUTO: 95.2 FL — SIGNIFICANT CHANGE UP (ref 80–100)
NRBC # BLD: 0 /100 WBCS — SIGNIFICANT CHANGE UP (ref 0–0)
NRBC # FLD: 0 K/UL — SIGNIFICANT CHANGE UP (ref 0–0)
PHOSPHATE SERPL-MCNC: 3.4 MG/DL — SIGNIFICANT CHANGE UP (ref 2.5–4.5)
PLATELET # BLD AUTO: 193 K/UL — SIGNIFICANT CHANGE UP (ref 150–400)
POTASSIUM SERPL-MCNC: 3.8 MMOL/L — SIGNIFICANT CHANGE UP (ref 3.5–5.3)
POTASSIUM SERPL-SCNC: 3.8 MMOL/L — SIGNIFICANT CHANGE UP (ref 3.5–5.3)
PROT SERPL-MCNC: 5.8 G/DL — LOW (ref 6–8.3)
PROTHROM AB SERPL-ACNC: 11.4 SEC — SIGNIFICANT CHANGE UP (ref 10.5–13.4)
RBC # BLD: 3.73 M/UL — LOW (ref 3.8–5.2)
RBC # FLD: 13 % — SIGNIFICANT CHANGE UP (ref 10.3–14.5)
RH IG SCN BLD-IMP: POSITIVE — SIGNIFICANT CHANGE UP
SODIUM SERPL-SCNC: 141 MMOL/L — SIGNIFICANT CHANGE UP (ref 135–145)
WBC # BLD: 2.26 K/UL — LOW (ref 3.8–10.5)
WBC # FLD AUTO: 2.26 K/UL — LOW (ref 3.8–10.5)

## 2023-05-21 PROCEDURE — 99232 SBSQ HOSP IP/OBS MODERATE 35: CPT | Mod: GC

## 2023-05-21 RX ORDER — MAGNESIUM SULFATE 500 MG/ML
2 VIAL (ML) INJECTION ONCE
Refills: 0 | Status: COMPLETED | OUTPATIENT
Start: 2023-05-21 | End: 2023-05-21

## 2023-05-21 RX ORDER — POTASSIUM CHLORIDE 20 MEQ
20 PACKET (EA) ORAL ONCE
Refills: 0 | Status: COMPLETED | OUTPATIENT
Start: 2023-05-21 | End: 2023-05-21

## 2023-05-21 RX ORDER — ACETAMINOPHEN 500 MG
1000 TABLET ORAL ONCE
Refills: 0 | Status: COMPLETED | OUTPATIENT
Start: 2023-05-21 | End: 2023-05-21

## 2023-05-21 RX ADMIN — ENOXAPARIN SODIUM 40 MILLIGRAM(S): 100 INJECTION SUBCUTANEOUS at 18:30

## 2023-05-21 RX ADMIN — Medication 400 MILLIGRAM(S): at 22:15

## 2023-05-21 RX ADMIN — Medication 25 GRAM(S): at 10:07

## 2023-05-21 RX ADMIN — Medication 1 TABLET(S): at 00:05

## 2023-05-21 RX ADMIN — Medication 1000 MILLIGRAM(S): at 22:45

## 2023-05-21 RX ADMIN — Medication 20 MILLIEQUIVALENT(S): at 10:07

## 2023-05-21 NOTE — PROGRESS NOTE ADULT - ASSESSMENT
67yo F with h/o rectal cancer s/p LAR with DLI (2017, Dr. Moore), ileostomy reversal (2017, Dr. Moore), exlap and small bowel resection for closed loop obstruction (2019, Dr. Moore) presenting for abdominal pain found to have likely choledocholithiasis without cholangitis.    Plan:  - Diet: CLD  - NPO after midnight  - IVF  - Activity OOB/Ambulating  - DVT PPx  - Pain management  - AM labs   - ERCP scheduled for tomorrow]  - Surgery possibly Tuesday     B-Team   [id number]

## 2023-05-22 ENCOUNTER — TRANSCRIPTION ENCOUNTER (OUTPATIENT)
Age: 68
End: 2023-05-22

## 2023-05-22 LAB
ALBUMIN SERPL ELPH-MCNC: 4 G/DL — SIGNIFICANT CHANGE UP (ref 3.3–5)
ALP SERPL-CCNC: 80 U/L — SIGNIFICANT CHANGE UP (ref 40–120)
ALT FLD-CCNC: 118 U/L — HIGH (ref 4–33)
ANION GAP SERPL CALC-SCNC: 10 MMOL/L — SIGNIFICANT CHANGE UP (ref 7–14)
APTT BLD: 32.1 SEC — SIGNIFICANT CHANGE UP (ref 27–36.3)
AST SERPL-CCNC: 71 U/L — HIGH (ref 4–32)
BILIRUB SERPL-MCNC: 0.6 MG/DL — SIGNIFICANT CHANGE UP (ref 0.2–1.2)
BUN SERPL-MCNC: 2 MG/DL — LOW (ref 7–23)
CALCIUM SERPL-MCNC: 9.3 MG/DL — SIGNIFICANT CHANGE UP (ref 8.4–10.5)
CHLORIDE SERPL-SCNC: 106 MMOL/L — SIGNIFICANT CHANGE UP (ref 98–107)
CO2 SERPL-SCNC: 25 MMOL/L — SIGNIFICANT CHANGE UP (ref 22–31)
CREAT SERPL-MCNC: 0.57 MG/DL — SIGNIFICANT CHANGE UP (ref 0.5–1.3)
EGFR: 98 ML/MIN/1.73M2 — SIGNIFICANT CHANGE UP
GLUCOSE SERPL-MCNC: 98 MG/DL — SIGNIFICANT CHANGE UP (ref 70–99)
HCT VFR BLD CALC: 39.4 % — SIGNIFICANT CHANGE UP (ref 34.5–45)
HGB BLD-MCNC: 12.6 G/DL — SIGNIFICANT CHANGE UP (ref 11.5–15.5)
INR BLD: 0.94 RATIO — SIGNIFICANT CHANGE UP (ref 0.88–1.16)
MAGNESIUM SERPL-MCNC: 2.3 MG/DL — SIGNIFICANT CHANGE UP (ref 1.6–2.6)
MCHC RBC-ENTMCNC: 30.7 PG — SIGNIFICANT CHANGE UP (ref 27–34)
MCHC RBC-ENTMCNC: 32 GM/DL — SIGNIFICANT CHANGE UP (ref 32–36)
MCV RBC AUTO: 96.1 FL — SIGNIFICANT CHANGE UP (ref 80–100)
NRBC # BLD: 0 /100 WBCS — SIGNIFICANT CHANGE UP (ref 0–0)
NRBC # FLD: 0 K/UL — SIGNIFICANT CHANGE UP (ref 0–0)
PHOSPHATE SERPL-MCNC: 3.2 MG/DL — SIGNIFICANT CHANGE UP (ref 2.5–4.5)
PLATELET # BLD AUTO: 204 K/UL — SIGNIFICANT CHANGE UP (ref 150–400)
POTASSIUM SERPL-MCNC: 4.4 MMOL/L — SIGNIFICANT CHANGE UP (ref 3.5–5.3)
POTASSIUM SERPL-SCNC: 4.4 MMOL/L — SIGNIFICANT CHANGE UP (ref 3.5–5.3)
PROT SERPL-MCNC: 6.6 G/DL — SIGNIFICANT CHANGE UP (ref 6–8.3)
PROTHROM AB SERPL-ACNC: 10.9 SEC — SIGNIFICANT CHANGE UP (ref 10.5–13.4)
RBC # BLD: 4.1 M/UL — SIGNIFICANT CHANGE UP (ref 3.8–5.2)
RBC # FLD: 13 % — SIGNIFICANT CHANGE UP (ref 10.3–14.5)
SODIUM SERPL-SCNC: 141 MMOL/L — SIGNIFICANT CHANGE UP (ref 135–145)
WBC # BLD: 2.63 K/UL — LOW (ref 3.8–10.5)
WBC # FLD AUTO: 2.63 K/UL — LOW (ref 3.8–10.5)

## 2023-05-22 PROCEDURE — 43262 ENDO CHOLANGIOPANCREATOGRAPH: CPT | Mod: GC

## 2023-05-22 PROCEDURE — 43264 ERCP REMOVE DUCT CALCULI: CPT | Mod: GC

## 2023-05-22 PROCEDURE — 74330 X-RAY BILE/PANC ENDOSCOPY: CPT | Mod: 26,GC

## 2023-05-22 DEVICE — GWIRE JAGTOME REVOLUTION RX 260CM/0.025IN: Type: IMPLANTABLE DEVICE | Status: FUNCTIONAL

## 2023-05-22 DEVICE — CATH BLLN EXTRACT DIST GUIDE WIRE 15MM 3LUM: Type: IMPLANTABLE DEVICE | Status: FUNCTIONAL

## 2023-05-22 RX ORDER — ACETAMINOPHEN 500 MG
725 TABLET ORAL ONCE
Refills: 0 | Status: COMPLETED | OUTPATIENT
Start: 2023-05-22 | End: 2023-05-22

## 2023-05-22 RX ORDER — SODIUM CHLORIDE 9 MG/ML
1000 INJECTION, SOLUTION INTRAVENOUS ONCE
Refills: 0 | Status: COMPLETED | OUTPATIENT
Start: 2023-05-22 | End: 2023-05-22

## 2023-05-22 RX ADMIN — Medication 290 MILLIGRAM(S): at 18:00

## 2023-05-22 RX ADMIN — Medication 725 MILLIGRAM(S): at 19:41

## 2023-05-22 RX ADMIN — SODIUM CHLORIDE 1000 MILLILITER(S): 9 INJECTION, SOLUTION INTRAVENOUS at 19:13

## 2023-05-22 RX ADMIN — DEXTROSE MONOHYDRATE, SODIUM CHLORIDE, AND POTASSIUM CHLORIDE 125 MILLILITER(S): 50; .745; 4.5 INJECTION, SOLUTION INTRAVENOUS at 06:27

## 2023-05-22 NOTE — PROGRESS NOTE ADULT - ASSESSMENT
67yo F with h/o rectal cancer s/p LAR with DLI (2017, Dr. Moore), ileostomy reversal (2017, Dr. Moore), exlap and small bowel resection for closed loop obstruction (2019, Dr. Moore) presenting for abdominal pain found to have likely choledocholithiasis without cholangitis.    Plan:  - ERCP today  - IVF  - Activity OOB/Ambulating  - DVT PPx  - Pain management  - tentative OR tomorrow    B-Team   [id number]

## 2023-05-22 NOTE — PACU DISCHARGE NOTE - COMMENTS
T(C): 36.4 (05-22-23 @ 17:18), Max: 36.7 (05-21-23 @ 22:00)  HR: 51 (05-22-23 @ 17:48) (51 - 61)  BP: 164/72 (05-22-23 @ 17:48) (110/62 - 164/72)  RR: 18 (05-22-23 @ 17:48) (15 - 18)  SpO2: 99% (05-22-23 @ 17:48) (97% - 100%)    No apparent anesthesia complications. Vital signs stable, non-labored breathing with adequate oxygen saturation. Pain and nausea are controlled. All questions answered. Stable for PACU discharge and transfer to the floor.

## 2023-05-22 NOTE — PROGRESS NOTE ADULT - SUBJECTIVE AND OBJECTIVE BOX
TEAM [ B ] Surgery Daily Progress Note  =====================================================    SUBJECTIVE: Patient seen and examined at bedside on AM rounds. Patient reports that they're feeling well. NPO for ERCP today with GI.      ALLERGIES:  No Known Allergies      --------------------------------------------------------------------------------------    MEDICATIONS:    Neurologic Medications  HYDROmorphone  Injectable 0.5 milliGRAM(s) IV Push every 4 hours PRN Severe Pain (7 - 10)  HYDROmorphone  Injectable 0.25 milliGRAM(s) IV Push every 4 hours PRN Moderate Pain (4 - 6)  ondansetron Injectable 4 milliGRAM(s) IV Push every 4 hours PRN Nausea and/or Vomiting    Respiratory Medications    Cardiovascular Medications    Gastrointestinal Medications  dextrose 5% + sodium chloride 0.45% with potassium chloride 20 mEq/L 1000 milliLiter(s) IV Continuous <Continuous>    Genitourinary Medications    Hematologic/Oncologic Medications  enoxaparin Injectable 40 milliGRAM(s) SubCutaneous every 24 hours    Antimicrobial/Immunologic Medications    Endocrine/Metabolic Medications    Topical/Other Medications    --------------------------------------------------------------------------------------    VITAL SIGNS:  T(C): 36.3 (05-22-23 @ 06:00), Max: 36.7 (05-21-23 @ 18:00)  HR: 61 (05-22-23 @ 06:00) (54 - 88)  BP: 113/67 (05-22-23 @ 06:00) (108/86 - 125/65)  RR: 18 (05-22-23 @ 06:00) (18 - 18)  SpO2: 99% (05-22-23 @ 06:00) (99% - 100%)  --------------------------------------------------------------------------------------    EXAM    General: NAD, resting in bed comfortably.  Cardiac: regular rate, warm and well perfused  Respiratory: Nonlabored respirations, normal cw expansion.  Abdomen: soft, tender to RUQ, nondistended.   Extremities: normal strength, FROM, no deformities    --------------------------------------------------------------------------------------    LABS    --------------------------------------------------------------------------------------    INS AND OUTS:    05-21-23 @ 07:01  -  05-22-23 @ 07:00  --------------------------------------------------------  IN: 3720 mL / OUT: 0 mL / NET: 3720 mL      --------------------------------------------------------------------------------------

## 2023-05-23 ENCOUNTER — RESULT REVIEW (OUTPATIENT)
Age: 68
End: 2023-05-23

## 2023-05-23 ENCOUNTER — TRANSCRIPTION ENCOUNTER (OUTPATIENT)
Age: 68
End: 2023-05-23

## 2023-05-23 LAB
ANION GAP SERPL CALC-SCNC: 11 MMOL/L — SIGNIFICANT CHANGE UP (ref 7–14)
APTT BLD: 29 SEC — SIGNIFICANT CHANGE UP (ref 27–36.3)
BUN SERPL-MCNC: 3 MG/DL — LOW (ref 7–23)
CALCIUM SERPL-MCNC: 8.9 MG/DL — SIGNIFICANT CHANGE UP (ref 8.4–10.5)
CHLORIDE SERPL-SCNC: 105 MMOL/L — SIGNIFICANT CHANGE UP (ref 98–107)
CO2 SERPL-SCNC: 25 MMOL/L — SIGNIFICANT CHANGE UP (ref 22–31)
CREAT SERPL-MCNC: 0.51 MG/DL — SIGNIFICANT CHANGE UP (ref 0.5–1.3)
EGFR: 101 ML/MIN/1.73M2 — SIGNIFICANT CHANGE UP
GLUCOSE SERPL-MCNC: 149 MG/DL — HIGH (ref 70–99)
HCT VFR BLD CALC: 35.7 % — SIGNIFICANT CHANGE UP (ref 34.5–45)
HGB BLD-MCNC: 11.4 G/DL — LOW (ref 11.5–15.5)
INR BLD: 1.03 RATIO — SIGNIFICANT CHANGE UP (ref 0.88–1.16)
MAGNESIUM SERPL-MCNC: 1.8 MG/DL — SIGNIFICANT CHANGE UP (ref 1.6–2.6)
MCHC RBC-ENTMCNC: 29.9 PG — SIGNIFICANT CHANGE UP (ref 27–34)
MCHC RBC-ENTMCNC: 31.9 GM/DL — LOW (ref 32–36)
MCV RBC AUTO: 93.7 FL — SIGNIFICANT CHANGE UP (ref 80–100)
NRBC # BLD: 0 /100 WBCS — SIGNIFICANT CHANGE UP (ref 0–0)
NRBC # FLD: 0 K/UL — SIGNIFICANT CHANGE UP (ref 0–0)
PHOSPHATE SERPL-MCNC: 3.4 MG/DL — SIGNIFICANT CHANGE UP (ref 2.5–4.5)
PLATELET # BLD AUTO: 180 K/UL — SIGNIFICANT CHANGE UP (ref 150–400)
POTASSIUM SERPL-MCNC: 4 MMOL/L — SIGNIFICANT CHANGE UP (ref 3.5–5.3)
POTASSIUM SERPL-SCNC: 4 MMOL/L — SIGNIFICANT CHANGE UP (ref 3.5–5.3)
PROTHROM AB SERPL-ACNC: 11.9 SEC — SIGNIFICANT CHANGE UP (ref 10.5–13.4)
RBC # BLD: 3.81 M/UL — SIGNIFICANT CHANGE UP (ref 3.8–5.2)
RBC # FLD: 12.6 % — SIGNIFICANT CHANGE UP (ref 10.3–14.5)
SODIUM SERPL-SCNC: 141 MMOL/L — SIGNIFICANT CHANGE UP (ref 135–145)
WBC # BLD: 2.45 K/UL — LOW (ref 3.8–10.5)
WBC # FLD AUTO: 2.45 K/UL — LOW (ref 3.8–10.5)

## 2023-05-23 PROCEDURE — 47562 LAPAROSCOPIC CHOLECYSTECTOMY: CPT | Mod: AS

## 2023-05-23 PROCEDURE — 88304 TISSUE EXAM BY PATHOLOGIST: CPT | Mod: 26

## 2023-05-23 PROCEDURE — 47562 LAPAROSCOPIC CHOLECYSTECTOMY: CPT

## 2023-05-23 PROCEDURE — 99232 SBSQ HOSP IP/OBS MODERATE 35: CPT | Mod: GC

## 2023-05-23 DEVICE — CLIP APPLIER COVIDIEN ENDOCLIP III 5MM: Type: IMPLANTABLE DEVICE | Status: FUNCTIONAL

## 2023-05-23 RX ORDER — ACETAMINOPHEN 500 MG
725 TABLET ORAL ONCE
Refills: 0 | Status: COMPLETED | OUTPATIENT
Start: 2023-05-23 | End: 2023-05-23

## 2023-05-23 RX ORDER — OXYCODONE HYDROCHLORIDE 5 MG/1
5 TABLET ORAL ONCE
Refills: 0 | Status: DISCONTINUED | OUTPATIENT
Start: 2023-05-23 | End: 2023-05-23

## 2023-05-23 RX ORDER — HYDROMORPHONE HYDROCHLORIDE 2 MG/ML
0.25 INJECTION INTRAMUSCULAR; INTRAVENOUS; SUBCUTANEOUS ONCE
Refills: 0 | Status: DISCONTINUED | OUTPATIENT
Start: 2023-05-23 | End: 2023-05-23

## 2023-05-23 RX ORDER — FENTANYL CITRATE 50 UG/ML
25 INJECTION INTRAVENOUS
Refills: 0 | Status: DISCONTINUED | OUTPATIENT
Start: 2023-05-23 | End: 2023-05-23

## 2023-05-23 RX ORDER — ONDANSETRON 8 MG/1
4 TABLET, FILM COATED ORAL ONCE
Refills: 0 | Status: DISCONTINUED | OUTPATIENT
Start: 2023-05-23 | End: 2023-05-23

## 2023-05-23 RX ORDER — ENOXAPARIN SODIUM 100 MG/ML
40 INJECTION SUBCUTANEOUS EVERY 24 HOURS
Refills: 0 | Status: DISCONTINUED | OUTPATIENT
Start: 2023-05-23 | End: 2023-05-25

## 2023-05-23 RX ORDER — OXYCODONE HYDROCHLORIDE 5 MG/1
1 TABLET ORAL
Qty: 12 | Refills: 0
Start: 2023-05-23 | End: 2023-05-25

## 2023-05-23 RX ORDER — SODIUM CHLORIDE 9 MG/ML
1000 INJECTION, SOLUTION INTRAVENOUS
Refills: 0 | Status: DISCONTINUED | OUTPATIENT
Start: 2023-05-23 | End: 2023-05-24

## 2023-05-23 RX ORDER — OXYCODONE HYDROCHLORIDE 5 MG/1
5 TABLET ORAL EVERY 4 HOURS
Refills: 0 | Status: DISCONTINUED | OUTPATIENT
Start: 2023-05-23 | End: 2023-05-25

## 2023-05-23 RX ORDER — ACETAMINOPHEN 500 MG
1000 TABLET ORAL EVERY 6 HOURS
Refills: 0 | Status: DISCONTINUED | OUTPATIENT
Start: 2023-05-23 | End: 2023-05-25

## 2023-05-23 RX ADMIN — Medication 725 MILLIGRAM(S): at 12:30

## 2023-05-23 RX ADMIN — DEXTROSE MONOHYDRATE, SODIUM CHLORIDE, AND POTASSIUM CHLORIDE 125 MILLILITER(S): 50; .745; 4.5 INJECTION, SOLUTION INTRAVENOUS at 06:13

## 2023-05-23 RX ADMIN — HYDROMORPHONE HYDROCHLORIDE 0.25 MILLIGRAM(S): 2 INJECTION INTRAMUSCULAR; INTRAVENOUS; SUBCUTANEOUS at 11:45

## 2023-05-23 RX ADMIN — Medication 1000 MILLIGRAM(S): at 18:10

## 2023-05-23 RX ADMIN — OXYCODONE HYDROCHLORIDE 5 MILLIGRAM(S): 5 TABLET ORAL at 12:30

## 2023-05-23 RX ADMIN — SODIUM CHLORIDE 100 MILLILITER(S): 9 INJECTION, SOLUTION INTRAVENOUS at 11:37

## 2023-05-23 RX ADMIN — Medication 290 MILLIGRAM(S): at 11:46

## 2023-05-23 RX ADMIN — HYDROMORPHONE HYDROCHLORIDE 0.25 MILLIGRAM(S): 2 INJECTION INTRAMUSCULAR; INTRAVENOUS; SUBCUTANEOUS at 11:28

## 2023-05-23 RX ADMIN — Medication 1000 MILLIGRAM(S): at 18:40

## 2023-05-23 NOTE — PROGRESS NOTE ADULT - ASSESSMENT
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 Advanced GI consulted for biliary dilatation.    # Biliary dilatation possibly 2/2 choledocholithiasis vs stricture vs malignancy  - US 5/17 revealed cholelithiasis, CBD 1.2 cm with distal tapering, and poorly visualized pancreas  - CT 5/15 revealed CBD up to 1.4 cm with distal tapering to normal caliber (stable from 2022, but new since 2020)  - s/p ERCP 5/22 with biliary sphincterotomy and stone removal  - s/p CCY 5/23    Recommendations:  - No additional intervention from advanced GI at this time  - Check liver enzymes today and tomorrow  - Ensure adequate hydration  - Antiemetics as tolerated  - Adequate pain control  - Rest of care per primary    Preliminary note until signed by Attending.    Thank you for involving us in this patient's care. Please reach out if any further questions or concerns.    Dennise West MD  Gastroenterology/Hepatology Fellow, PGY-VI    NON-URGENT CONSULTS:  Please email [email or web address]  AT NIGHT AND ON WEEKENDS:  Contact on-call GI fellow via answering service ([phone]) from 5pm-8am and on weekends/holidays  MONDAY-FRIDAY 8AM-5PM:  Pager# [phone] ([facility])

## 2023-05-23 NOTE — DISCHARGE NOTE PROVIDER - PROVIDER TOKENS
PROVIDER:[TOKEN:[[id number]],FOLLOWUP:[1 week]] PROVIDER:[TOKEN:[[id number]],FOLLOWUP:[1 week]],PROVIDER:[TOKEN:[[id number]],FOLLOWUP:[2 weeks]]

## 2023-05-23 NOTE — CHART NOTE - NSCHARTNOTEFT_GEN_A_CORE
MRCP resulted  IMPRESSION:  Cholelithiasis and choledocholithiasis, with at least one 4 mm stone   within the distal CBD (likely two). Mild biliary ductal dilatation with   CBD measuring up to 1.2 cm in caliber.    Labs reviewed.     Recommendations:   - will proceed with ERCP tomorrow.   - Make her NPO after midnight.   - Please obtain early 2 AM labs prior to the procedure.     GI will continue to follow.       Ramon Cox, PGY-4  Gastroenterology/Hepatology Fellow  Available on [employer] Teams  [phone] (Short Range Pager)  [phone] (Long Range Pager)    After 5pm, please contact the on-call GI fellow. [phone]
Patient initially admitted to Dr. Moore (D team), and transferred to B team for further management in discussion with Dr. Moore and Dr. Childers.
Post Operative Note  Patient: JOCE EMMANUEL 68y (1955) Female   MRN: [id number]  Location: [place] [address] A  Visit: 05-18-23 Inpatient  Date: 05-23-23 @ 14:13    Procedure: S/P laparoscopic cholecystectomy    Subjective: Patient seen and examined post operatively. Reports pain as controlled. Denies nausea, vomiting, fever, chills, chest pain, SOB, cough.      Objective:  Vitals: T(F): 98.1 (05-23-23 @ 13:00), Max: 98.1 (05-23-23 @ 13:00)  HR: 64 (05-23-23 @ 13:00)  BP: 145/57 (05-23-23 @ 13:00) (115/56 - 178/81)  RR: 16 (05-23-23 @ 13:00)  SpO2: 99% (05-23-23 @ 13:00)  Vent Settings:     In:   05-22-23 @ 07:01  -  05-23-23 @ 07:00  --------------------------------------------------------  IN: 2125 mL    05-23-23 @ 07:01  -  05-23-23 @ 14:13  --------------------------------------------------------  IN: 440 mL      IV Fluids: lactated ringers. 1000 milliLiter(s) (100 mL/Hr) IV Continuous <Continuous>      Out:   05-22-23 @ 07:01  -  05-23-23 @ 07:00  --------------------------------------------------------  OUT: 800 mL    05-23-23 @ 07:01  -  05-23-23 @ 14:13  --------------------------------------------------------  OUT: 0 mL      EBL:     Voided Urine:   05-22-23 @ 07:01  -  05-23-23 @ 07:00  --------------------------------------------------------  OUT: 800 mL    05-23-23 @ 07:01  -  05-23-23 @ 14:13  --------------------------------------------------------  OUT: 0 mL        Physical Examination:  General: NAD, resting comfortably in bed  HEENT: Normocephalic atraumatic  Respiratory: Nonlabored respirations, normal CW expansion.  Cardio: S1S2, regular rate and rhythm.  Abdomen: softly distended, appropriately tender, port sites c/d/i  Vascular: extremities are warm and well perfused.       Assessment:  67yo F with h/o rectal cancer s/p LAR with DLI (2017, Dr. Moore), ileostomy reversal (2017, Dr. Moore), exlap and small bowel resection for closed loop obstruction (2019, Dr. Moore) presenting for abdominal pain found to have likely choledocholithiasis without cholangitis. Now s/p ERCP with stone removal and biliary sphincterotomy on 5/22. Patient was taken to the OR today for laparoscopic cholecystectomy    Plan:  - Diet: regular  - Pain control PRN  - IVF  - Activity: out of bed and ambulate  - DVT ppx: SCD's & Lovenox  - Discharge home tomorrow    Date/Time: 05-23-23 @ 14:13

## 2023-05-23 NOTE — DISCHARGE NOTE PROVIDER - CARE PROVIDER_API CALL
Kory Ryan)  Surgery  [address] [place] [address]  Phone: [phone]  Fax: [phone]  Follow Up Time: 1 week   Kory Ryan)  Surgery  [address] [place] [address]  Phone: [phone]  Fax: [phone]  Follow Up Time: 1 week    Trae Quintero)  Gastroenterology; Internal Medicine  [address] [place] [address] [address]  [place] [address]  Phone: [phone]  Fax: [phone]  Follow Up Time: 2 weeks

## 2023-05-23 NOTE — DISCHARGE NOTE PROVIDER - HOSPITAL COURSE
68 year old female with choledocholithiasis s/p ERCP biliary sphincterotomy and stone extraction presents now s/p laparoscopic cholecystectomy. Patient tolerated operation well and there were no post-operative complications identified. Patient remained hemodynamically stable in the PACU and transferred to the surgical floor. Diet was restarted and advanced as tolerated. Pain control was transitioned from IV to PO pain meds. At this time, patient is currently ambulating, voiding, tolerating a regular diet. Pain well controlled on PO pain meds. Patient has been deemed stable for discharge home with follow up as an outpatient.

## 2023-05-23 NOTE — DISCHARGE NOTE PROVIDER - CARE PROVIDERS DIRECT ADDRESSES
[email or web address]

## 2023-05-23 NOTE — DISCHARGE NOTE PROVIDER - NSDCFUADDINST_GEN_ALL_CORE_FT
WOUND CARE:  Please keep incisions clean and dry. Please do not Scrub or rub incisions. Do not use lotion or powder on incisions.   BATHING: You may shower and/or sponge bathe. You may use warm soapy water in the shower and rinse, pat dry.  ACTIVITY: No heavy lifting or straining. Otherwise, you may return to your usual level of physical activity. If you are taking narcotic pain medication DO NOT drive a car, operate machinery or make important decisions.  DIET: Return to your usual diet.  NOTIFY YOUR SURGEON IF YOU HAVE: any bleeding that does not stop, any pus draining from your wound(s), any fever (over 100.4 F) persistent nausea/vomiting, or if your pain is not controlled on your discharge pain medications, unable to urinate.  Please follow up with your primary care physician in one week regarding your hospitalization, bring copies of your discharge paperwork.  Please follow up with your surgeon, Dr. Ryan WOUND CARE:  Please keep incisions clean and dry. Please do not Scrub or rub incisions. Do not use lotion or powder on incisions.   BATHING: You may shower and/or sponge bathe. You may use warm soapy water in the shower and rinse, pat dry.  ACTIVITY: No heavy lifting or straining. Otherwise, you may return to your usual level of physical activity. If you are taking narcotic pain medication DO NOT drive a car, operate machinery or make important decisions.  DIET: Low Fat Diet   NOTIFY YOUR SURGEON IF YOU HAVE: any bleeding that does not stop, any pus draining from your wound(s), any fever (over 100.4 F) persistent nausea/vomiting, or if your pain is not controlled on your discharge pain medications, unable to urinate.  Please follow up with your primary care physician in one week regarding your hospitalization, bring copies of your discharge paperwork.  Please follow up with your surgeon, Dr. Ryan

## 2023-05-23 NOTE — PROGRESS NOTE ADULT - SUBJECTIVE AND OBJECTIVE BOX
ANESTHESIA POSTOP CHECK    68y Female POSTOP DAY 1 S/P     Vital Signs Last 24 Hrs  T(C): 36.6 (23 May 2023 08:35), Max: 36.6 (22 May 2023 14:05)  T(F): 97.9 (23 May 2023 08:35), Max: 97.9 (23 May 2023 08:35)  HR: 58 (23 May 2023 08:35) (49 - 60)  BP: 131/70 (23 May 2023 08:35) (110/62 - 178/81)  BP(mean): --  RR: 16 (23 May 2023 08:35) (15 - 18)  SpO2: 99% (23 May 2023 08:35) (97% - 100%)    Parameters below as of 23 May 2023 08:35  Patient On (Oxygen Delivery Method): room air      I&O's Summary    22 May 2023 07:01  -  23 May 2023 07:00  --------------------------------------------------------  IN: 2125 mL / OUT: 800 mL / NET: 1325 mL        [x ] NO APPARENT ANESTHESIA COMPLICATIONS      Comments:

## 2023-05-23 NOTE — PROGRESS NOTE ADULT - ASSESSMENT
67yo F with h/o rectal cancer s/p LAR with DLI (2017, Dr. Moore), ileostomy reversal (2017, Dr. Moore), exlap and small bowel resection for closed loop obstruction (2019, Dr. Moore) presenting for abdominal pain found to have likely choledocholithiasis without cholangitis. Now s/p ERCP with stone removal on 5/22, added on for lap liliana 5/23    Plan:  - OR lap liliana today  - NPO, IVF  - Activity OOB/Ambulating  - DVT PPx  - Pain management    B-Team   [id number]

## 2023-05-23 NOTE — PROGRESS NOTE ADULT - ATTENDING COMMENTS
Symptomatic cholelithiasis  a.  Admit to B surgery / [facility]REG EASTON  b.  Keep NPO at this time  c.  Continue IVF resuscitation  d.  Plan for laparoscopic cholecystectomy  e.  Note of abnormal LFTs, With dilated CBD diameter.  Trend LFTS, MRCP likely.  Possible need for ERCP discussed
Pt seen and examined.  Agree with resident eval and plan. Imp: Choledocholithiasis & cholelithiasis.  NPO p [place] for ERCP tomorrow.  Lap cholecystectomy prior to discharge.
Will plan for lap liliana s/p ERCP  serial abd exams  trend labs  NPO/IVF after midnight if able to undergo cholecystectomy.     Ricco Reveles MD  Acute and Critical Care Surgery    The Acute Care Surgery (B Team) Attending Group Practice:  Dr. Brooke Childers, Dr. Yonatan Duvall, Dr. Ricco Reveles,  Dr. Armen Medeiros and Dr. Daria Mckee     Urgent issues - spectra [id number] or [id number]  Nonurgent issues - [phone]  Patient appointments or after hours - [phone]
Patient seen and examined with the GI fellow. I agree with the above assessment and plan. Thank you for allowing us to care for your patient.
Pt seen and examined with housestaff.  Agree with assessment and plan. Imp: Choledocholithiasis, cholelithiasis.  MRCP/ERCP

## 2023-05-23 NOTE — DISCHARGE NOTE PROVIDER - NSDCMRMEDTOKEN_GEN_ALL_CORE_FT
acetaminophen 325 mg oral tablet: 2 tab(s) orally every 6 hours, As needed, Mild Pain (1 - 3)  oxyCODONE 5 mg oral tablet: 1 tab(s) orally every 6 hours MDD: 4 tablets

## 2023-05-23 NOTE — DISCHARGE NOTE PROVIDER - NSDCFUSCHEDAPPT_GEN_ALL_CORE_FT
Kory Moore  [facility]  [facility] [address] [facility]  Scheduled Appointment: 06/26/2023

## 2023-05-23 NOTE — PROGRESS NOTE ADULT - SUBJECTIVE AND OBJECTIVE BOX
Chief Complaint:  Patient is a 68y old  Female who presents with a chief complaint of Choledocholithiasis (23 May 2023 11:21)      Reason for consult:    Interval Events:     Hospital Medications:  fentaNYL    Injectable 25 MICROGram(s) IV Push every 10 minutes PRN  HYDROmorphone  Injectable 0.25 milliGRAM(s) IV Push every 4 hours PRN  HYDROmorphone  Injectable 0.5 milliGRAM(s) IV Push every 4 hours PRN  lactated ringers. 1000 milliLiter(s) IV Continuous <Continuous>  ondansetron Injectable 4 milliGRAM(s) IV Push every 4 hours PRN  ondansetron Injectable 4 milliGRAM(s) IV Push once PRN      ROS:   General:  No  fevers, chills, night sweats, fatigue  Eyes:  Good vision, no reported pain  ENT:  No sore throat, pain, runny nose  CV:  No pain, palpitations  Pulm:  No dyspnea, cough  GI:  See HPI, otherwise negative  :  No  incontinence, nocturia  Muscle:  No pain, weakness  Neuro:  No memory problems  Psych:  No insomnia, mood problems, depression  Endocrine:  No polyuria, polydipsia, cold/heat intolerance  Heme:  No petechiae, ecchymosis, easy bruisability  Skin:  No rash    PHYSICAL EXAM:   Vital Signs:  Vital Signs Last 24 Hrs  T(C): 36.7 (23 May 2023 13:00), Max: 36.7 (23 May 2023 12:00)  T(F): 98.1 (23 May 2023 13:00), Max: 98.1 (23 May 2023 13:00)  HR: 64 (23 May 2023 13:00) (49 - 64)  BP: 145/57 (23 May 2023 13:00) (110/62 - 178/81)  BP(mean): 79 (23 May 2023 12:45) (77 - 84)  RR: 16 (23 May 2023 13:00) (12 - 18)  SpO2: 99% (23 May 2023 13:00) (97% - 100%)    Parameters below as of 23 May 2023 13:00  Patient On (Oxygen Delivery Method): room air      Daily Height in cm: 157.5 (23 May 2023 07:40)    Daily     GENERAL: no acute distress  NEURO: alert  HEENT: anicteric sclera, no conjunctival pallor appreciated  CHEST: no respiratory distress, no accessory muscle use  CARDIAC: regular rate, rhythm  ABDOMEN: soft, non-tender, non-distended, no rebound or guarding  EXTREMITIES: warm, well perfused, no edema  SKIN: no lesions noted    LABS: reviewed                        11.4   2.45  )-----------( 180      ( 23 May 2023 05:00 )             35.7     05-23    141  |  105  |  3<L>  ----------------------------<  149<H>  4.0   |  25  |  0.51    Ca    8.9      23 May 2023 05:00  Phos  3.4     05-23  Mg     1.80     05-23    TPro  6.6  /  Alb  4.0  /  TBili  0.6  /  DBili  x   /  AST  71<H>  /  ALT  118<H>  /  AlkPhos  80  05-22    LIVER FUNCTIONS - ( 22 May 2023 05:30 )  Alb: 4.0 g/dL / Pro: 6.6 g/dL / ALK PHOS: 80 U/L / ALT: 118 U/L / AST: 71 U/L / GGT: x             Interval Diagnostic Studies: see sunrise for full report   Chief Complaint:  Patient is a 68y old  Female who presents with a chief complaint of Choledocholithiasis (23 May 2023 11:21)      Interval Events:   Now s/p CCY  Reports mild diffuse abdominal soreness from procedure, but otherwise says she feels well  No n/v    Hospital Medications:  fentaNYL    Injectable 25 MICROGram(s) IV Push every 10 minutes PRN  HYDROmorphone  Injectable 0.25 milliGRAM(s) IV Push every 4 hours PRN  HYDROmorphone  Injectable 0.5 milliGRAM(s) IV Push every 4 hours PRN  lactated ringers. 1000 milliLiter(s) IV Continuous <Continuous>  ondansetron Injectable 4 milliGRAM(s) IV Push every 4 hours PRN  ondansetron Injectable 4 milliGRAM(s) IV Push once PRN      ROS:   Complete and normal except as mentioned above.    PHYSICAL EXAM:   Vital Signs:  Vital Signs Last 24 Hrs  T(C): 36.7 (23 May 2023 13:00), Max: 36.7 (23 May 2023 12:00)  T(F): 98.1 (23 May 2023 13:00), Max: 98.1 (23 May 2023 13:00)  HR: 64 (23 May 2023 13:00) (49 - 64)  BP: 145/57 (23 May 2023 13:00) (110/62 - 178/81)  BP(mean): 79 (23 May 2023 12:45) (77 - 84)  RR: 16 (23 May 2023 13:00) (12 - 18)  SpO2: 99% (23 May 2023 13:00) (97% - 100%)    Parameters below as of 23 May 2023 13:00  Patient On (Oxygen Delivery Method): room air      Daily Height in cm: 157.5 (23 May 2023 07:40)    Daily     GENERAL: no acute distress  NEURO: alert  HEENT: anicteric sclera, no conjunctival pallor appreciated  CHEST: no respiratory distress, no accessory muscle use  CARDIAC: regular rate, rhythm  ABDOMEN: soft, diffuse tenderness, non-distended, no rebound or guarding  EXTREMITIES: warm, well perfused, no edema  SKIN: no lesions noted    LABS: reviewed                        11.4   2.45  )-----------( 180      ( 23 May 2023 05:00 )             35.7     05-23    141  |  105  |  3<L>  ----------------------------<  149<H>  4.0   |  25  |  0.51    Ca    8.9      23 May 2023 05:00  Phos  3.4     05-23  Mg     1.80     05-23    TPro  6.6  /  Alb  4.0  /  TBili  0.6  /  DBili  x   /  AST  71<H>  /  ALT  118<H>  /  AlkPhos  80  05-22    LIVER FUNCTIONS - ( 22 May 2023 05:30 )  Alb: 4.0 g/dL / Pro: 6.6 g/dL / ALK PHOS: 80 U/L / ALT: 118 U/L / AST: 71 U/L / GGT: x             Interval Diagnostic Studies: see sunrise for full report

## 2023-05-23 NOTE — PROGRESS NOTE ADULT - SUBJECTIVE AND OBJECTIVE BOX
TEAM [ B ] Surgery Daily Progress Note  =====================================================    SUBJECTIVE: Patient seen and examined at bedside on AM rounds. Patient reports that they're feeling well. Patient is s/p ERCP yesterday with GI. Pending lap liliana today. Denies fever, chills, N/V, abdominal pain, chest pain, SOB    ALLERGIES:  No Known Allergies      --------------------------------------------------------------------------------------    MEDICATIONS:    Neurologic Medications  HYDROmorphone  Injectable 0.25 milliGRAM(s) IV Push every 4 hours PRN Moderate Pain (4 - 6)  HYDROmorphone  Injectable 0.5 milliGRAM(s) IV Push every 4 hours PRN Severe Pain (7 - 10)  ondansetron Injectable 4 milliGRAM(s) IV Push every 4 hours PRN Nausea and/or Vomiting    Respiratory Medications    Cardiovascular Medications    Gastrointestinal Medications  dextrose 5% + sodium chloride 0.45% with potassium chloride 20 mEq/L 1000 milliLiter(s) IV Continuous <Continuous>    Genitourinary Medications    Hematologic/Oncologic Medications    Antimicrobial/Immunologic Medications    Endocrine/Metabolic Medications    Topical/Other Medications    --------------------------------------------------------------------------------------    VITAL SIGNS:  ICU Vital Signs Last 24 Hrs  T(C): 36.4 (23 May 2023 02:02), Max: 36.6 (22 May 2023 14:05)  T(F): 97.5 (23 May 2023 02:02), Max: 97.8 (22 May 2023 14:05)  HR: 55 (23 May 2023 02:02) (49 - 60)  BP: 120/51 (23 May 2023 02:02) (110/62 - 178/81)  BP(mean): --  ABP: --  ABP(mean): --  RR: 18 (23 May 2023 02:02) (15 - 18)  SpO2: 97% (23 May 2023 02:02) (97% - 100%)    O2 Parameters below as of 23 May 2023 02:02  Patient On (Oxygen Delivery Method): room air  --------------------------------------------------------------------------------------    EXAM    General: NAD, resting in bed comfortably.  Cardiac: regular rate, warm and well perfused  Respiratory: Nonlabored respirations, normal cw expansion.  Abdomen: soft, nontender, nondistended  Extremities: normal strength, FROM, no deformities    --------------------------------------------------------------------------------------    LABS                        11.4   2.45  )-----------( 180      ( 23 May 2023 05:00 )             35.7   05-23    141  |  105  |  3<L>  ----------------------------<  149<H>  4.0   |  25  |  0.51    Ca    8.9      23 May 2023 05:00  Phos  3.4     05-23  Mg     1.80     05-23    TPro  6.6  /  Alb  4.0  /  TBili  0.6  /  DBili  x   /  AST  71<H>  /  ALT  118<H>  /  AlkPhos  80  05-22      --------------------------------------------------------------------------------------    INS AND OUTS:  I&O's Detail    22 May 2023 07:01  -  23 May 2023 07:00  --------------------------------------------------------  IN:    dextrose 5% + sodium chloride 0.45% w/ Additives: 1625 mL  Total IN: 1625 mL    OUT:    Oral Fluid: 0 mL    Voided (mL): 800 mL  Total OUT: 800 mL    Total NET: 825 mL        --------------------------------------------------------------------------------------

## 2023-05-24 LAB
ANION GAP SERPL CALC-SCNC: 11 MMOL/L — SIGNIFICANT CHANGE UP (ref 7–14)
BUN SERPL-MCNC: 6 MG/DL — LOW (ref 7–23)
CALCIUM SERPL-MCNC: 8.9 MG/DL — SIGNIFICANT CHANGE UP (ref 8.4–10.5)
CHLORIDE SERPL-SCNC: 105 MMOL/L — SIGNIFICANT CHANGE UP (ref 98–107)
CO2 SERPL-SCNC: 27 MMOL/L — SIGNIFICANT CHANGE UP (ref 22–31)
CREAT SERPL-MCNC: 0.57 MG/DL — SIGNIFICANT CHANGE UP (ref 0.5–1.3)
EGFR: 98 ML/MIN/1.73M2 — SIGNIFICANT CHANGE UP
GLUCOSE SERPL-MCNC: 83 MG/DL — SIGNIFICANT CHANGE UP (ref 70–99)
HCT VFR BLD CALC: 33 % — LOW (ref 34.5–45)
HGB BLD-MCNC: 10.7 G/DL — LOW (ref 11.5–15.5)
MAGNESIUM SERPL-MCNC: 1.9 MG/DL — SIGNIFICANT CHANGE UP (ref 1.6–2.6)
MCHC RBC-ENTMCNC: 30.8 PG — SIGNIFICANT CHANGE UP (ref 27–34)
MCHC RBC-ENTMCNC: 32.4 GM/DL — SIGNIFICANT CHANGE UP (ref 32–36)
MCV RBC AUTO: 95.1 FL — SIGNIFICANT CHANGE UP (ref 80–100)
NRBC # BLD: 0 /100 WBCS — SIGNIFICANT CHANGE UP (ref 0–0)
NRBC # FLD: 0 K/UL — SIGNIFICANT CHANGE UP (ref 0–0)
PHOSPHATE SERPL-MCNC: 3.4 MG/DL — SIGNIFICANT CHANGE UP (ref 2.5–4.5)
PLATELET # BLD AUTO: 179 K/UL — SIGNIFICANT CHANGE UP (ref 150–400)
POTASSIUM SERPL-MCNC: 3.4 MMOL/L — LOW (ref 3.5–5.3)
POTASSIUM SERPL-SCNC: 3.4 MMOL/L — LOW (ref 3.5–5.3)
RBC # BLD: 3.47 M/UL — LOW (ref 3.8–5.2)
RBC # FLD: 13 % — SIGNIFICANT CHANGE UP (ref 10.3–14.5)
SODIUM SERPL-SCNC: 143 MMOL/L — SIGNIFICANT CHANGE UP (ref 135–145)
WBC # BLD: 4.81 K/UL — SIGNIFICANT CHANGE UP (ref 3.8–10.5)
WBC # FLD AUTO: 4.81 K/UL — SIGNIFICANT CHANGE UP (ref 3.8–10.5)

## 2023-05-24 RX ADMIN — Medication 1000 MILLIGRAM(S): at 23:13

## 2023-05-24 RX ADMIN — ENOXAPARIN SODIUM 40 MILLIGRAM(S): 100 INJECTION SUBCUTANEOUS at 00:31

## 2023-05-24 RX ADMIN — ENOXAPARIN SODIUM 40 MILLIGRAM(S): 100 INJECTION SUBCUTANEOUS at 23:18

## 2023-05-24 NOTE — PROGRESS NOTE ADULT - ASSESSMENT
67yo F with h/o rectal cancer s/p LAR with DLI (2017, Dr. Moore), ileostomy reversal (2017, Dr. Moore), exlap and small bowel resection for closed loop obstruction (2019, Dr. Moore) presenting for abdominal pain found to have likely choledocholithiasis without cholangitis. Now s/p ERCP with stone removal and biliary sphincterotomy on 5/22. Patient was taken to the OR today for laparoscopic cholecystectomy    Plan:  - Diet: regular  - Pain control PRN  - Activity: out of bed and ambulate  - DVT ppx: SCD's & Lovenox  - Discharge home today    B team surgery [id number] 67yo F with h/o rectal cancer s/p LAR with DLI (2017, Dr. Moore), ileostomy reversal (2017, Dr. Moore), exlap and small bowel resection for closed loop obstruction (2019, Dr. Moore) presenting for abdominal pain found to have likely choledocholithiasis without cholangitis. Now s/p ERCP with stone removal and biliary sphincterotomy on 5/22. Patient was taken to the OR for laparoscopic cholecystectomy on 5/23    Plan:  - Diet: regular  - Pain control PRN  - Activity: out of bed and ambulate  - DVT ppx: SCD's & Lovenox  - Discharge home today    B team surgery [id number]

## 2023-05-24 NOTE — PROGRESS NOTE ADULT - SUBJECTIVE AND OBJECTIVE BOX
TEAM [ B ] Surgery Daily Progress Note  =====================================================    SUBJECTIVE: Patient seen and examined at bedside on AM rounds. Patient reports that they're feeling well. Patient is tolerating clear liquids, did not have regular diet for dinner secondary to pain. Denies fever, chills, N/V, chest pain, SOB    ALLERGIES:  No Known Allergies      --------------------------------------------------------------------------------------    MEDICATIONS:    Neurologic Medications  acetaminophen     Tablet .. 1000 milliGRAM(s) Oral every 6 hours  oxyCODONE    IR 5 milliGRAM(s) Oral every 4 hours PRN Moderate to Severe Pain (4-10)    Respiratory Medications    Cardiovascular Medications    Gastrointestinal Medications  lactated ringers. 1000 milliLiter(s) IV Continuous <Continuous>    Genitourinary Medications    Hematologic/Oncologic Medications  enoxaparin Injectable 40 milliGRAM(s) SubCutaneous every 24 hours    Antimicrobial/Immunologic Medications    Endocrine/Metabolic Medications    Topical/Other Medications    --------------------------------------------------------------------------------------    VITAL SIGNS:  ICU Vital Signs Last 24 Hrs  T(C): 36.5 (24 May 2023 06:00), Max: 36.7 (23 May 2023 12:00)  T(F): 97.7 (24 May 2023 06:00), Max: 98.1 (23 May 2023 13:00)  HR: 56 (24 May 2023 06:00) (56 - 64)  BP: 122/60 (24 May 2023 06:00) (109/51 - 152/59)  BP(mean): 79 (23 May 2023 12:45) (77 - 84)  ABP: --  ABP(mean): --  RR: 18 (24 May 2023 06:00) (12 - 18)  SpO2: 99% (24 May 2023 06:00) (97% - 100%)    O2 Parameters below as of 24 May 2023 06:00  Patient On (Oxygen Delivery Method): room air    --------------------------------------------------------------------------------------    EXAM    General: NAD, resting in bed comfortably.  Cardiac: regular rate, warm and well perfused  Respiratory: Nonlabored respirations, normal cw expansion.  Abdomen: soft, nontender, nondistended, port sites c/d/i  Extremities: normal strength, FROM, no deformities    --------------------------------------------------------------------------------------    LABS                        10.7   4.81  )-----------( 179      ( 24 May 2023 05:25 )             33.0   05-24    143  |  105  |  6<L>  ----------------------------<  83  3.4<L>   |  27  |  0.57    Ca    8.9      24 May 2023 05:25  Phos  3.4     05-24  Mg     1.90     05-24  --------------------------------------------------------------------------------------    INS AND OUTS:  I&O's Detail    23 May 2023 07:01  -  24 May 2023 07:00  --------------------------------------------------------  IN:    Lactated Ringers: 1800 mL    Oral Fluid: 1000 mL  Total IN: 2800 mL    OUT:    Voided (mL): 1050 mL  Total OUT: 1050 mL    Total NET: 1750 mL  --------------------------------------------------------------------------------------

## 2023-05-25 ENCOUNTER — TRANSCRIPTION ENCOUNTER (OUTPATIENT)
Age: 68
End: 2023-05-25

## 2023-05-25 VITALS
SYSTOLIC BLOOD PRESSURE: 113 MMHG | DIASTOLIC BLOOD PRESSURE: 57 MMHG | OXYGEN SATURATION: 99 % | TEMPERATURE: 98 F | HEART RATE: 61 BPM | RESPIRATION RATE: 17 BRPM

## 2023-05-25 RX ORDER — PANTOPRAZOLE SODIUM 20 MG/1
40 TABLET, DELAYED RELEASE ORAL DAILY
Refills: 0 | Status: DISCONTINUED | OUTPATIENT
Start: 2023-05-25 | End: 2023-05-25

## 2023-05-25 RX ADMIN — Medication 1000 MILLIGRAM(S): at 00:13

## 2023-05-25 RX ADMIN — Medication 1000 MILLIGRAM(S): at 05:15

## 2023-05-25 RX ADMIN — Medication 1000 MILLIGRAM(S): at 06:15

## 2023-05-25 RX ADMIN — Medication 1000 MILLIGRAM(S): at 11:42

## 2023-05-25 RX ADMIN — PANTOPRAZOLE SODIUM 40 MILLIGRAM(S): 20 TABLET, DELAYED RELEASE ORAL at 11:42

## 2023-05-25 NOTE — DIETITIAN INITIAL EVALUATION ADULT - OTHER INFO
Medical course: Per chart 68 year old Female with h/o rectal cancer s/p LAR with DLI (2017, Dr. Moore), ileostomy reversal (2017, Dr. Moore), exlap and small bowel resection for closed loop obstruction (2019, Dr. Moore) presenting for abdominal pain found to have likely choledocholithiasis without cholangitis. Now s/p ERCP with stone removal and biliary sphincterotomy on 5/22. Patient was taken to the OR for laparoscopic cholecystectomy on 5/23.     Nutrition interview: Pt A&Ox4. No recent episodes of nausea or vomiting. Pt reports diarrhea, loose watery stool s/p cholecystectomy. Last BM noted on 5/24 per RN flowsheets. Denies any chewing/swallowing difficulties. No known food allergies. UBW per [facility] HIE ~105# >2 years. Food preferences explored and noted. Intake is 25-50% per RN flowsheets and per pt. Pt reports that she ate some oatmeal and potatoes for breakfast this morning. Feeding skills: independent. Discussed nutrition therapy recommendations for diarrhea s/p cholecystectomy. Low fat diet may be liberalized to regular s/p cholecystectomy.

## 2023-05-25 NOTE — DISCHARGE NOTE NURSING/CASE MANAGEMENT/SOCIAL WORK - NSDCPEFALRISK_GEN_ALL_CORE
For information on Fall & Injury Prevention, visit: [email or web address] OR  [email or web address] OR  https://www.cdc.gov/steadi/patient.html

## 2023-05-25 NOTE — DIETITIAN INITIAL EVALUATION ADULT - PERTINENT MEDS FT
MEDICATIONS  (STANDING):  acetaminophen     Tablet .. 1000 milliGRAM(s) Oral every 6 hours  enoxaparin Injectable 40 milliGRAM(s) SubCutaneous every 24 hours  pantoprazole    Tablet 40 milliGRAM(s) Oral daily    MEDICATIONS  (PRN):  oxyCODONE    IR 5 milliGRAM(s) Oral every 4 hours PRN Moderate to Severe Pain (4-10)

## 2023-05-25 NOTE — DIETITIAN INITIAL EVALUATION ADULT - ADD RECOMMEND
1) Recommend continue low fat diet   2) Consider multivitamin once daily for micronutrient provisions   3) May consider psyllium powder to help bulk stool in setting of dairrhea  4) Obtain weekly weights   5) Monitor weights, labs, BM's, skin integrity, p.o. intake.   6) Encourage PO intake and honor food preferences as able.

## 2023-05-25 NOTE — DISCHARGE NOTE NURSING/CASE MANAGEMENT/SOCIAL WORK - PATIENT PORTAL LINK FT
You can access the FollowMyHealth Patient Portal offered by [facility] by registering at the following website: [email or web address]. By joining [facility]’s FollowMyHealth portal, you will also be able to view your health information using other applications (apps) compatible with our system.

## 2023-05-25 NOTE — PROGRESS NOTE ADULT - SUBJECTIVE AND OBJECTIVE BOX
Subjective: Pt seen and examined at bedside with surgical team.    Vital Signs Last 24 Hrs  T(C): 36.6 (25 May 2023 05:19), Max: 36.8 (24 May 2023 10:00)  T(F): 97.8 (25 May 2023 05:19), Max: 98.3 (24 May 2023 10:00)  HR: 56 (25 May 2023 05:19) (56 - 65)  BP: 131/64 (25 May 2023 05:19) (113/65 - 145/64)  BP(mean): --  RR: 16 (25 May 2023 05:19) (16 - 18)  SpO2: 98% (25 May 2023 05:19) (98% - 100%)    Parameters below as of 25 May 2023 05:19  Patient On (Oxygen Delivery Method): room air        I&O's Detail    24 May 2023 07:01  -  25 May 2023 07:00  --------------------------------------------------------  IN:    Oral Fluid: 200 mL  Total IN: 200 mL    OUT:    Voided (mL): 1300 mL  Total OUT: 1300 mL    Total NET: -1100 mL      MEDICATIONS  (STANDING):  acetaminophen     Tablet .. 1000 milliGRAM(s) Oral every 6 hours  enoxaparin Injectable 40 milliGRAM(s) SubCutaneous every 24 hours  pantoprazole    Tablet 40 milliGRAM(s) Oral daily    MEDICATIONS  (PRN):  oxyCODONE    IR 5 milliGRAM(s) Oral every 4 hours PRN Moderate to Severe Pain (4-10)      LABS:                        10.7   4.81  )-----------( 179      ( 24 May 2023 05:25 )             33.0     05-24    143  |  105  |  6<L>  ----------------------------<  83  3.4<L>   |  27  |  0.57    Ca    8.9      24 May 2023 05:25  Phos  3.4     05-24  Mg     1.90     05-24      EXAM  General: NAD, resting in bed comfortably.  Cardiac: regular rate, warm and well perfused  Respiratory: Nonlabored respirations, normal cw expansion.  Abdomen: soft, nontender, nondistended, port sites c/d/i  Extremities: normal strength, FROM, no deformities

## 2023-05-25 NOTE — DIETITIAN INITIAL EVALUATION ADULT - ORAL INTAKE PTA/DIET HISTORY
Pt  Pt is independent with ADLs PTA, able to cook and grocery shop for self. Pt reports followed general healthy diet.

## 2023-05-25 NOTE — DIETITIAN INITIAL EVALUATION ADULT - LITERATURE/VIDEOS GIVEN
Discussed and provided Pt with diarrhea nutrition therapy handout. Discussed Nutrition recommendations for s/p gallbladder removal. Limiting foods that can exacerbate diarrhea, repleting electrolytes, foods that can help bulk stool, and small frequent meals.

## 2023-05-25 NOTE — PROGRESS NOTE ADULT - ASSESSMENT
69yo F with h/o rectal cancer s/p LAR with DLI (2017, Dr. Moore), ileostomy reversal (2017, Dr. Moore), exlap and small bowel resection for closed loop obstruction (2019, Dr. Moore) presenting for abdominal pain found to have likely choledocholithiasis without cholangitis. Now s/p ERCP with stone removal and biliary sphincterotomy on 5/22. Patient was taken to the OR for laparoscopic cholecystectomy on 5/23    Plan:  - Diet: regular  - Pain control PRN  - Activity: out of bed and ambulate  - DVT ppx: SCD's & Lovenox  - Discharge home today    B team surgery [id number]

## 2023-05-30 NOTE — DISCHARGE NOTE ADULT - HOSPITAL COURSE
Applicator:  62F patient presents for planned surgery for rectal mass  Patient taken to the OR for a laparotomy, LAR, and diverting loop ileostomy. Post operatively patient hemodynamically stable and transferred to the floor.  Patient OOB and ambulating, pain well controlled. Patient began having ostomy function and diet was advanced and tolerated.  Patient stable for discharge home with VNS for ostomy care, to follow up as an outpatient. 62F patient presents for planned surgery for rectal mass  Patient taken to the OR for a laparotomy, LAR, and diverting loop ileostomy. Post operatively patient hemodynamically stable and transferred to the floor.  Patient OOB and ambulating, pain well controlled. Patient began having ostomy function and diet was advanced and tolerated.  Patient stable for discharge home with VNS for ostomy care, to follow up as an outpatient.    [facility] REF# #: [id number]

## 2023-06-07 LAB — SURGICAL PATHOLOGY STUDY: SIGNIFICANT CHANGE UP

## 2023-06-14 NOTE — PATIENT PROFILE ADULT - LIVING ENVIRONMENT
Physical Therapy Evaluation    Visit Type: Initial Evaluation  Visit: 1  Referring Provider: Keisha Serna PA-C  Medical Diagnosis (from order): Diagnosis Information    Diagnosis  V58.89, 840.9 (ICD-9-CM) - S46.011D (ICD-10-CM) - Strain of muscle(s) and tendon(s) of the rotator cuff of right shoulder, subsequent encounter  726.10 (ICD-9-CM) - M75.51 (ICD-10-CM) - Bursitis of right shoulder       Treatment Diagnosis: right shoulder - increased pain/symptoms, impaired strength, impaired range of motion and impaired posture.  Onset  - Date of onset: 6/5/2023  Chart reviewed at time of initial evaluation (relevant co-morbidities, allergies, tests and medications listed):   - Diagnostic tests reviewed: X-Ray  hypertension  Past Medical History:  11/26/2008: Atypical chest pain  10/22/2014: Cataract  No date: Degenerative joint disease (DJD) of lumbar spine  7/19/2012: Difficulty breathing  No date: Diverticulosis  No date: Dysthymia  11/29/2016: Gastroesophageal reflux disease  No date: GERD (gastroesophageal reflux disease)  11/26/2008: Glaucoma  No date: HTN (hypertension)  No date: Hyperlipidemia  8/19/2014: Hypertension, essential, benign  3/29/2017: Lumbar disc disease with radiculopathy  No date: Myalgia  12/30/2010: Other chronic sinusitis  No date: PAD (peripheral artery disease) (CMD)  7/1/2019: Peripheral arterial disease (CMD)  4/1/2019: Shortness of breath  No date: Systemic lupus erythematosus, unspecified (CMD)  No date: Tuberculosis      Comment:  1973 as per pt.     Current Outpatient Medications:  rosuvastatin (CRESTOR) 10 MG tablet, TAKE 1 TABLET EVERY DAY, Disp: 90 tablet, Rfl: 0  gabapentin (NEURONTIN) 300 MG capsule, Take 1 capsule by mouth every 12 hours., Disp: 180 capsule, Rfl: 3  losartan (COZAAR) 25 MG tablet, Take 1 tablet by mouth daily., Disp: 90 tablet, Rfl: 3  meclizine (ANTIVERT) 25 MG tablet, Take 1 tablet by mouth 3 times daily as needed for Dizziness., Disp: 20 tablet, Rfl:  0  fluocinonide (LIDEX) 0.05 % cream, Apply topically 2 times daily., Disp: 60 g, Rfl: 0  famotidine (PEPCID) 20 MG tablet, TAKE 1 TABLET EVERY DAY, Disp: 90 tablet, Rfl: 0  Calcium Carb-Cholecalciferol (Caltrate 600+D3 Soft) 600-20 MG-MCG Chew Tab, Chew 1 each by mouth 3 times daily., Disp: 90 tablet, Rfl: 3  aspirin 81 MG chewable tablet, Chew 81 mg by mouth., Disp: , Rfl:   dorzolamide (TRUSOPT) 2 % ophthalmic solution, Place 1 drop into both eyes in the morning and 1 drop in the evening., Disp: , Rfl:   allopurinol (ZYLOPRIM) 100 MG tablet, Take 1 tablet by mouth daily., Disp: 90 tablet, Rfl: 3  ferrous gluconate 324 (37.5 Fe) MG tablet, Take 1 tablet by mouth daily after a meal., Disp: 90 tablet, Rfl: 1  traMADol (ULTRAM) 50 MG tablet, Take 1 tablet by mouth every 6 hours as needed for Pain., Disp: 20 tablet, Rfl: 0  acetaminophen (TYLENOL) 325 MG tablet, Take 2 tablets by mouth every 6 hours as needed for Pain., Disp: 30 tablet, Rfl: 0  latanoprost (XALATAN) 0.005 % ophthalmic solution, Apply 1 drop to eye at bedtime., Disp: , Rfl:     No current facility-administered medications for this encounter.      SUBJECTIVE                                                                                                               Pt is a 76 y/o female who reports to physical therapy w/ the referring diagnosis of R shoulder rotator cuff sprain and bursitis.  She reports that she has had the pain for a while thinking it was just arthritis, but her doctor thinks it is something else. She reports that her arm hurts the most when she is laying down in bed at night and that she props up her arm on a pillow which helps w/ the pain.  She reports that her pain can go down her R arm to her forearm and she denies numbness/ tingling.  She reports she has assistance w/ groceries where she has a service shop and carry them into her home for her.      Pain / Symptoms  - Pain/symptom is: constant  - Pain rating (out of 10): Current:  3 ; Best: 1; Worst: 5  - Location: All over the shoulder and lateral shoulder  - Quality / Description: sharp  - Alleviating Factors: rest, avoiding movement in involved area     - Support arm on a pillow    Function:   Limitations / Exacerbation Factors:   - Patient reports pain, difficulty and increased time with function reported below.  - upper body dressing, grooming/hygiene/self-care activities, house/yard work, lifting/carrying and reaching  - Does not carry heavy things  Prior Level of Function: declining function, therefore referred to therapy,    Patient Goals: decreased pain and increased motion.    Prior treatment  - no therapies and injections Cannot recall which shoulder  - Discharged from hospital, home health, or skilled nursing facility in last 30 days: no  Home Environment   - Patient lives with: alone      OBJECTIVE                                                                                                                    Vitals:  BP LUE Sittin/72 mmHg asymptomatic  HR:  77 bpm  SPO2:  97 %    Posture:  - Seated: forward head  - Shoulders: rounded      Range of Motion (ROM)   (degrees unless noted; active unless noted; norms in ( ); negative=lacking to 0, positive=beyond 0)  Shoulder:    - Flexion (180):        • Left: WNL         • Right: 147    - Abduction (180):        • Left: WNL        • Right: 115    - Internal Rotation:         • Left: WNL        - at 90° (70-90):            • Right: 90    - External Rotation:        • Left: WNL        • Right: pain       - at 90° (90):            • Right: 90     Strength  (out of 5 unless noted, standard test position unless noted)   Shoulder:     - Flexion:         • Left: 4         • Right: 3-     - Abduction:        • Left: 4        • Right: 3-, pain    - Internal Rotation:          • Left (at 0°): 4+        • Right (at 0°): 4    - External Rotation:         • Left (at 0°): 4        • Right (at 0°) :3+                      Outcome/Assessments  6/14/23  Outcome Measures:   Quick Disabilities of the Arm, Shoulder and Hand: QuickDash Total Score (Score will not calculate if more then 2 questions are left blank): 18.18  (scored 0-100; a higher score indicates greater disability) see flowsheet for additional documentation      Treatment     Skilled input: verbal instruction/cues and demonstration    Writer verbally educated and received verbal consent for hand placement, positioning of patient, and techniques to be performed today from patient for clothing adjustments for techniques, hand placement and palpation for techniques and therapist position for techniques as described above and how they are pertinent to the patient's plan of care.  Home Exercise Program  Access Code: [email or web address]  URL: [email or web address]  Date: 06/14/2023  Prepared by: Karlos Canales    Exercises  - Supine Shoulder Flexion with Dowel  - 2 x daily - 5-7 x weekly - 3 sets - 10 reps - 3 hold  - Supine Shoulder Abduction AAROM with Dowel  - 2 x daily - 5-7 x weekly - 3 sets - 10 reps - 3 hold    Patient Education  - Ice      ASSESSMENT                                                                                                          75 year old patient has reported functional limitations listed above impacted by signs and symptoms consistent with treatment diagnosis below.  Treatment Diagnosis:   - Involved: right shoulder.  - Symptoms/impairments: increased pain/symptoms, impaired strength, impaired range of motion and impaired posture.    D/t the impairments listed above, the pt has seen an increase in difficulty in performing ADLs/ IADLs.  Pt demos decreased shoulder ROM which has impacted her ability to perform dressing and self care activities.  She additionally demos decreased shoulder strength which has impacted her ability to perform house chores dishes, mopping, and sweeping.  She also demos forward head posture w/ rounded  shoulders which also has impacted her ability in performing reaching activities.  Pt would benefit from skilled PT in order to improve function, improve quality of life, and to reduce the difficulty in performing ADLs/ IADLs.  An HEP was given which was reviewed to answer questions. Additionally a handout was given and reviewed about icing in order to assist in reducing pain.    Prognosis: Patient will benefit from skilled therapy.  Rehabilitative potential is: good.  Predicted patient presentation: Low (stable) - Patient comorbidities and complexities, as defined above, will have little effect on progress for prescribed plan of care.  Education:   - Present and ready to learn: patient  - Results of above outlined education: Verbalizes understanding and Demonstrates understanding    PLAN                                                                                                                         The following skilled interventions to be implemented to achieve goals listed below:  Neuromuscular Re-Education (36791)  Therapeutic Activity (23695)  Therapeutic Exercise (25572)  Manual Therapy (76650)    Frequency / Duration  1 times per week tapering as patient progresses for 6 weeks for an estimated total of 6 visits    Patient involved in and agreed to plan of care and goals.  Attendance policy reviewed with patient.    Suggestions for next session as indicated: Progress per plan of care      Goals  Increased Pain  Decreased ROM  Decreased Strength  The above improvements in impairments to assist in obtaining goals listed below  Long Term Goals: to be met by end of plan of care  1. Pt will demo WFL of shoulder ROM in order to reduce the difficulty in self care activities.  2. Pt will demo -4/5 shoulder strength in order to reduce the difficulty in house chores.  3. Pt will demo improved upright posture in order to reduce the strain on her upper body while doing her ADLs/ IADLs.  4. Patient will be  independent with progressed and modified home exercise program.      Therapy procedure time and total treatment time can be found documented on the Time Entry flowsheet     no

## 2023-06-23 ENCOUNTER — APPOINTMENT (OUTPATIENT)
Dept: SURGERY | Facility: HOSPITAL | Age: 68
End: 2023-06-23
Payer: MEDICAID

## 2023-06-23 VITALS
TEMPERATURE: 96.4 F | WEIGHT: 106 LBS | HEIGHT: 62 IN | BODY MASS INDEX: 19.51 KG/M2 | HEART RATE: 61 BPM | SYSTOLIC BLOOD PRESSURE: 136 MMHG | DIASTOLIC BLOOD PRESSURE: 70 MMHG

## 2023-06-23 PROCEDURE — 99024 POSTOP FOLLOW-UP VISIT: CPT

## 2023-06-26 ENCOUNTER — APPOINTMENT (OUTPATIENT)
Dept: SURGICAL ONCOLOGY | Facility: CLINIC | Age: 68
End: 2023-06-26
Payer: MEDICARE

## 2023-06-26 VITALS
OXYGEN SATURATION: 98 % | WEIGHT: 105 LBS | SYSTOLIC BLOOD PRESSURE: 133 MMHG | DIASTOLIC BLOOD PRESSURE: 73 MMHG | BODY MASS INDEX: 19.32 KG/M2 | RESPIRATION RATE: 17 BRPM | HEART RATE: 73 BPM | HEIGHT: 62 IN

## 2023-06-26 PROCEDURE — 99214 OFFICE O/P EST MOD 30 MIN: CPT

## 2023-06-26 NOTE — ASSESSMENT
[FreeTextEntry1] : IMP:\par JENNI - hx of Rectal cancer, no longer following with [facility]\par \par CT C/A/P 9/2022- JENNI \par Colonoscopy as per Dr. Velasquez  (negative in march, 2021, repeat colonoscopy due 3/2023 )\par \par S/P cholecystectomy 5/2023, benign path \par \par PLAN:\par labwork now\par F/U with Dr. Velasquez regarding post-op GI symptoms and for screening colonoscopy \par CT C/A/P 9/2023 ( yearly )\par RTO yearly w/ labs

## 2023-06-26 NOTE — HISTORY OF PRESENT ILLNESS
[de-identified] : Ms. JOCE EMMANUEL is a 68 year old woman here for a follow-up visit \par \par Hx rectal adenocarcinoma \par She began neoadjuvant chemoradiation on 5/15/17 under the care of Dr. Jones and Dr. Booker which concluded on 6/22/17.\par \par She is s/p LAR with loop ileostomy on 8/4/17. Final pathology showed residual adenocarcinoma of the rectum (0.9 cm). 13 lymph nodes and margins were negative though tumor deposits were present. Stage T2N1c. She was re admitted on 8/31/17 with an SBO which resolved with NGT decompression. She is now post/op small bowel resection to close loop ileostomy with lysis of adhesions for release of internal hernia on 12/1/17.\par \par She is s/p rigid sigmoidoscopy and rectal dilatation for rectal stricture from low anterior anastomosis on 1/9/19. \par \par CT C/A/P 1/16/19 -Stable exam. No evidence of metastatic disease. \par \par She presented to [facility] ER on 10/16/19 after 2 days of worsening abdominal pain, nausea w/o vomiting.  CT A/P obtained in the ED showed closed loop SBO with 2 transition points.  She went emergency to the OR on 10/16/19 for an exploratory laparotomy, IRLANDA and release of internal hernia, small bowel resection, mesenteric lymph node resection.   Final pathology was benign (segment of small bowel with serositis and serosal fibrosis and adhesions), negative margins ,multiple reactive lymph nodes. \par \par She was re-admitted to [facility] on 10/27/19 with c/o generalized abdominal pain and found on CT to have a pre-sacral abdominal collection.  She was admitted and made NPO.  She went to IR on 10/28 for drainage.  She tolerated the procedure well.  She was discharged home on 10/30/19. \par \par \par She changed GI care to Dr. Vance Velasquez.\par Endoscopy 3/1/2021- Chronic gastritis without bleeding\par Colonoscopy 3/1/2021- Well healed surgical anastomosis, no local recurrence, diminutive flat polyp of size 5 mm at distance 20cm (polypectomy done), \par \par CT C/A/P 9/20/2021-  IMP: No evidence of metastatic disease. Interval enlargement of a right adnexal cyst for which correlation with pelvis sonogram is recommended. \par \par [place] followed up with Dr. Suarez regarding the adnexal cyst, last saw her in March of 2022, states ultrasound and imaging done by GYN. \par \par Denies nausea, vomiting, changes in appetite or bowel habits. Denies BRBPR. \par Internist: Dr. Trevor Howell.

## 2023-08-03 ENCOUNTER — RESULT REVIEW (OUTPATIENT)
Age: 68
End: 2023-08-03

## 2023-08-23 ENCOUNTER — OUTPATIENT (OUTPATIENT)
Dept: OUTPATIENT SERVICES | Facility: HOSPITAL | Age: 68
LOS: 1 days | End: 2023-08-23
Payer: MEDICARE

## 2023-08-23 ENCOUNTER — APPOINTMENT (OUTPATIENT)
Dept: CT IMAGING | Facility: IMAGING CENTER | Age: 68
End: 2023-08-23
Payer: MEDICARE

## 2023-08-23 DIAGNOSIS — Z98.890 OTHER SPECIFIED POSTPROCEDURAL STATES: Chronic | ICD-10-CM

## 2023-08-23 DIAGNOSIS — Z90.49 ACQUIRED ABSENCE OF OTHER SPECIFIED PARTS OF DIGESTIVE TRACT: Chronic | ICD-10-CM

## 2023-08-23 DIAGNOSIS — C20 MALIGNANT NEOPLASM OF RECTUM: ICD-10-CM

## 2023-08-23 PROCEDURE — 71260 CT THORAX DX C+: CPT | Mod: 26

## 2023-08-23 PROCEDURE — 74177 CT ABD & PELVIS W/CONTRAST: CPT | Mod: 26

## 2023-08-23 PROCEDURE — 71260 CT THORAX DX C+: CPT

## 2023-08-23 PROCEDURE — 74177 CT ABD & PELVIS W/CONTRAST: CPT

## 2023-10-07 NOTE — PATIENT PROFILE ADULT - HAS THE PATIENT BEEN ADMITTED FROM SHORT TERM REHAB?
PRINCIPAL PROCEDURE  Procedure: Cystoscopy with percutaneous nephrolithotomy  Findings and Treatment: right      SECONDARY PROCEDURE  Procedure: Cystoscopy with percutaneous nephrolithotomy  Findings and Treatment:      no

## 2023-10-24 NOTE — ASU PREOP CHECKLIST - TEMPERATURE IN FAHRENHEIT (DEGREES F)
Chest pain with radiation to neck and left arm today, worse than any prior chest pain  History of MI in 2013 with LAD stent  Recent cath in August which showed no obstructive epicardial coronary artery disease, patent LAD stent. Troponins negative x2, vital stable currently  EKG not significantly different than prior EKG, showed normal sinus rhythm with nonspecific ST segment changes in inferior/lateral leads  Heart score 6  In ED, received fentanyl for pain relief  Less likely shingles due to no visible rash and description of pain    CTA dissection protocol:  No acute findings. Specifically, no aortic pathology to account for the patient's symptoms.      Plan:  No further work-up from cardiology standpoint  Continue utilizing as needed cardiac medications  Continue multimodal pain management with Tylenol, topical lidocaine, muscle relaxant medication  As per GI consult: Patient can receive esophageal manometry as outpatient, no indication for further inpatient work-up from GI standpoint 98.4

## 2023-11-18 NOTE — PRE-ANESTHESIA EVALUATION ADULT - NSATTENDATTESTRD_GEN_ALL_CORE
unable to assess due to patient's cognitive impairment. patient unable to formulate sentence. no family present to help answer questions.
The patient has been re-examined and I agree with the above assessment or I updated with my findings.

## 2023-12-13 NOTE — ED ADULT TRIAGE NOTE - PAIN: PRESENCE, MLM
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

## 2024-02-26 NOTE — PATIENT PROFILE ADULT - NSPROMUTANXFEARADDRESSFT_GEN_A_NUR
Caller: Sundar Guzman    Relationship to patient: Self    Best call back number: [phone]    Patient is needing: RECEIVED A CALL FROM PT. SHE CALLED TO CANCEL SAME DAY APPT BECAUSE SHE STATED SHE IS HAVING SEIZURES TODAY AND SHE IS NOT ABLE TO DRIVE HERSELF. HUB RESCHEDULED NEXT AVAILABLE AUDIO AND FOLLOW UP WITH DR. BAEZ AND ADDED TO WAIT LIST. PT REQUESTED A CALL BACK TO DISCUSS U/S RESULTS AND ASK IF SHE SHOULD BE SEEN SOONER BECAUSE OF THE U/S FINDINGS. OFFICE PLEASE CALL BACK WITH THESE REGARDS.THANK YOU.    keep me informed

## 2024-05-06 NOTE — DIETITIAN INITIAL EVALUATION ADULT - EDUCATION DIETARY MODIFICATIONS
The patient is a 46y Female complaining of abdominal pain. teach back/(2) meets goals/outcomes/verbalization

## 2024-06-27 ENCOUNTER — APPOINTMENT (OUTPATIENT)
Dept: SURGICAL ONCOLOGY | Facility: CLINIC | Age: 69
End: 2024-06-27
Payer: MEDICARE

## 2024-06-27 ENCOUNTER — APPOINTMENT (OUTPATIENT)
Dept: HEMATOLOGY ONCOLOGY | Facility: CLINIC | Age: 69
End: 2024-06-27

## 2024-06-27 VITALS
DIASTOLIC BLOOD PRESSURE: 77 MMHG | OXYGEN SATURATION: 95 % | RESPIRATION RATE: 17 BRPM | HEIGHT: 62 IN | WEIGHT: 108 LBS | HEART RATE: 65 BPM | SYSTOLIC BLOOD PRESSURE: 138 MMHG | BODY MASS INDEX: 19.88 KG/M2

## 2024-06-27 DIAGNOSIS — C20 MALIGNANT NEOPLASM OF RECTUM: ICD-10-CM

## 2024-06-27 PROCEDURE — 99214 OFFICE O/P EST MOD 30 MIN: CPT

## 2024-06-28 LAB
ALBUMIN SERPL ELPH-MCNC: 4.5 G/DL
ALP BLD-CCNC: 36 U/L
ALT SERPL-CCNC: 15 U/L
AST SERPL-CCNC: 31 U/L
BILIRUB DIRECT SERPL-MCNC: 0.1 MG/DL
BILIRUB INDIRECT SERPL-MCNC: 0.5 MG/DL
BILIRUB SERPL-MCNC: 0.6 MG/DL
CANCER AG19-9 SERPL-ACNC: 10 U/ML
CEA SERPL-MCNC: 1.6 NG/ML
PROT SERPL-MCNC: 7.5 G/DL

## 2024-07-03 ENCOUNTER — APPOINTMENT (OUTPATIENT)
Dept: CT IMAGING | Facility: IMAGING CENTER | Age: 69
End: 2024-07-03
Payer: MEDICARE

## 2024-07-03 ENCOUNTER — OUTPATIENT (OUTPATIENT)
Dept: OUTPATIENT SERVICES | Facility: HOSPITAL | Age: 69
LOS: 1 days | End: 2024-07-03
Payer: MEDICARE

## 2024-07-03 DIAGNOSIS — Z98.890 OTHER SPECIFIED POSTPROCEDURAL STATES: Chronic | ICD-10-CM

## 2024-07-03 DIAGNOSIS — C20 MALIGNANT NEOPLASM OF RECTUM: ICD-10-CM

## 2024-07-03 DIAGNOSIS — Z90.49 ACQUIRED ABSENCE OF OTHER SPECIFIED PARTS OF DIGESTIVE TRACT: Chronic | ICD-10-CM

## 2024-07-03 PROCEDURE — 71260 CT THORAX DX C+: CPT

## 2024-07-03 PROCEDURE — 71260 CT THORAX DX C+: CPT | Mod: 26

## 2024-07-03 PROCEDURE — 74177 CT ABD & PELVIS W/CONTRAST: CPT | Mod: 26

## 2024-07-03 PROCEDURE — 74177 CT ABD & PELVIS W/CONTRAST: CPT

## 2024-09-05 NOTE — PATIENT PROFILE ADULT. - CENTRAL VENOUS CATHETER
[id number] of [place] sent Rx request for the following:      Requested Prescriptions   Pending Prescriptions Disp Refills    sildenafil (VIAGRA) 50 MG tablet [Pharmacy Med Name: Sildenafil Citrate 50 MG Oral Tablet] 30 tablet 0     Sig: TAKE 1 TABLET BY MOUTH ONCE DAILY AS NEEDED   Last Prescription Date:   7/26/23  Last Fill Qty/Refills:         30, R-4    Last Office Visit:              7/26/23 (impotence discussed)   Future Office visit:           10/2/24    Unable to complete prescription refill per RN Medication Refill Policy. Charleen Humphries RN .............. 9/5/2024  9:08 AM  
no

## 2024-11-18 NOTE — ED ADULT NURSE REASSESSMENT NOTE - NS ED NURSE REASSESS COMMENT FT1
report received from CHERRI Padilla, pt resting, easily arousable to voice, respirations equal and non labored. IV fluids infusing well. pt c/o lower abdominal "heaviness", RLQ, LLQ tender to palpation, abdomen soft and non distended. pt ambulated to restroom independently, gait steady, no SOB noted. pt unable to have BM at this time. vitals as noted, will continue to monitor. N/A

## 2025-01-07 ENCOUNTER — APPOINTMENT (OUTPATIENT)
Dept: CARDIOLOGY | Facility: CLINIC | Age: 70
End: 2025-01-07

## 2025-02-19 ENCOUNTER — OUTPATIENT (OUTPATIENT)
Dept: OUTPATIENT SERVICES | Facility: HOSPITAL | Age: 70
LOS: 1 days | End: 2025-02-19
Payer: MEDICARE

## 2025-02-19 ENCOUNTER — APPOINTMENT (OUTPATIENT)
Dept: ULTRASOUND IMAGING | Facility: CLINIC | Age: 70
End: 2025-02-19
Payer: MEDICARE

## 2025-02-19 DIAGNOSIS — Z98.890 OTHER SPECIFIED POSTPROCEDURAL STATES: Chronic | ICD-10-CM

## 2025-02-19 DIAGNOSIS — Z90.49 ACQUIRED ABSENCE OF OTHER SPECIFIED PARTS OF DIGESTIVE TRACT: Chronic | ICD-10-CM

## 2025-02-19 DIAGNOSIS — N83.209 UNSPECIFIED OVARIAN CYST, UNSPECIFIED SIDE: ICD-10-CM

## 2025-02-19 PROCEDURE — 76830 TRANSVAGINAL US NON-OB: CPT

## 2025-02-19 PROCEDURE — 76856 US EXAM PELVIC COMPLETE: CPT | Mod: 26

## 2025-02-19 PROCEDURE — 76856 US EXAM PELVIC COMPLETE: CPT

## 2025-02-19 PROCEDURE — 76830 TRANSVAGINAL US NON-OB: CPT | Mod: 26

## 2025-03-31 NOTE — ED ADULT TRIAGE NOTE - HEART RATE (BEATS/MIN)
"Subjective   Beth Fregoso is a 88 y.o. female.   Chief Complaint   Patient presents with    Med Refill    Shortness of Breath    Foot Swelling       History of Present Illness   Seen as a new patient last week for SOB.  CXR showed opacities that could be infection vs pulmonary edema. BNP was elevated. Given doxycyline and lasix. Had echo about 6 m ago with EF 55%.  No CP.   The following portions of the patient's history were reviewed and updated as appropriate: allergies, current medications, past family history, past medical history, past social history, past surgical history, and problem list.    Review of Systems   Constitutional:  Negative for fatigue and fever.   Respiratory:  Positive for shortness of breath. Negative for cough.    Cardiovascular:  Positive for leg swelling. Negative for chest pain.   Gastrointestinal:  Negative for abdominal pain.   Psychiatric/Behavioral:  Negative for confusion.      /50 (BP Location: Right arm, Patient Position: Sitting, Cuff Size: Adult)   Temp 98 °F (36.7 °C) (Temporal)   Ht 151 cm (59.45\")   Wt 76 kg (167 lb 9.6 oz)   SpO2 95%   BMI 33.34 kg/m²     Objective   Physical Exam  Vitals reviewed.   Cardiovascular:      Rate and Rhythm: Normal rate and regular rhythm.   Pulmonary:      Effort: No respiratory distress.      Breath sounds: No wheezing.   Musculoskeletal:      Right lower leg: Edema (1+) present.      Left lower leg: Edema (1+) present.   Neurological:      Mental Status: She is alert and oriented to person, place, and time.         Assessment & Plan   Diagnoses and all orders for this visit:    1. SOB (shortness of breath) (Primary)  -     Comprehensive Metabolic Panel; Future  -     BNP; Future  -     Ambulatory Referral to Cardiology    2. Aortic valve insufficiency, etiology of cardiac valve disease unspecified  -     Ambulatory Referral to Cardiology    3. Mitral valve insufficiency, unspecified etiology  -     Ambulatory Referral to " Cardiology    4. Lower extremity edema  Hold on HCTZ and started her on lasix.                  80

## 2025-04-21 NOTE — PATIENT PROFILE ADULT. - FUNCTIONAL LEVEL PRIOR: DRESSING
Patient mom stated will not be in town for 4/29.   Scheduled for 5/19 at 330 with KIMBERLY   (2) assistive person

## 2025-06-30 ENCOUNTER — NON-APPOINTMENT (OUTPATIENT)
Age: 70
End: 2025-06-30

## 2025-06-30 ENCOUNTER — APPOINTMENT (OUTPATIENT)
Dept: HEMATOLOGY ONCOLOGY | Facility: CLINIC | Age: 70
End: 2025-06-30

## 2025-06-30 ENCOUNTER — APPOINTMENT (OUTPATIENT)
Dept: SURGICAL ONCOLOGY | Facility: CLINIC | Age: 70
End: 2025-06-30
Payer: MEDICARE

## 2025-06-30 VITALS
SYSTOLIC BLOOD PRESSURE: 132 MMHG | DIASTOLIC BLOOD PRESSURE: 76 MMHG | HEART RATE: 67 BPM | WEIGHT: 110 LBS | HEIGHT: 62 IN | BODY MASS INDEX: 20.24 KG/M2 | OXYGEN SATURATION: 97 %

## 2025-06-30 PROCEDURE — 99214 OFFICE O/P EST MOD 30 MIN: CPT

## 2025-07-01 LAB
ALBUMIN SERPL ELPH-MCNC: 4.1 G/DL
ALP BLD-CCNC: 45 U/L
ALT SERPL-CCNC: 17 U/L
AST SERPL-CCNC: 32 U/L
BILIRUB DIRECT SERPL-MCNC: 0.1 MG/DL
BILIRUB INDIRECT SERPL-MCNC: 0.3 MG/DL
BILIRUB SERPL-MCNC: 0.4 MG/DL
CANCER AG19-9 SERPL-ACNC: 10 U/ML
CEA SERPL-MCNC: 1.8 NG/ML
PROT SERPL-MCNC: 7.5 G/DL

## 2025-07-02 ENCOUNTER — EMERGENCY (EMERGENCY)
Facility: HOSPITAL | Age: 70
LOS: 1 days | End: 2025-07-02
Attending: EMERGENCY MEDICINE | Admitting: EMERGENCY MEDICINE
Payer: COMMERCIAL

## 2025-07-02 VITALS
DIASTOLIC BLOOD PRESSURE: 78 MMHG | OXYGEN SATURATION: 95 % | RESPIRATION RATE: 16 BRPM | HEIGHT: 62 IN | TEMPERATURE: 98 F | SYSTOLIC BLOOD PRESSURE: 180 MMHG | HEART RATE: 64 BPM | WEIGHT: 110.01 LBS

## 2025-07-02 VITALS
RESPIRATION RATE: 18 BRPM | OXYGEN SATURATION: 100 % | TEMPERATURE: 98 F | SYSTOLIC BLOOD PRESSURE: 176 MMHG | DIASTOLIC BLOOD PRESSURE: 72 MMHG | HEART RATE: 60 BPM

## 2025-07-02 DIAGNOSIS — Z90.49 ACQUIRED ABSENCE OF OTHER SPECIFIED PARTS OF DIGESTIVE TRACT: Chronic | ICD-10-CM

## 2025-07-02 DIAGNOSIS — Z98.890 OTHER SPECIFIED POSTPROCEDURAL STATES: Chronic | ICD-10-CM

## 2025-07-02 PROCEDURE — 99284 EMERGENCY DEPT VISIT MOD MDM: CPT

## 2025-07-02 PROCEDURE — 72084 X-RAY EXAM ENTIRE SPI 6/> VW: CPT | Mod: 26

## 2025-07-02 RX ORDER — CYCLOBENZAPRINE HYDROCHLORIDE 15 MG/1
10 CAPSULE, EXTENDED RELEASE ORAL ONCE
Refills: 0 | Status: COMPLETED | OUTPATIENT
Start: 2025-07-02 | End: 2025-07-02

## 2025-07-02 RX ORDER — KETOROLAC TROMETHAMINE 30 MG/ML
30 INJECTION, SOLUTION INTRAMUSCULAR; INTRAVENOUS ONCE
Refills: 0 | Status: DISCONTINUED | OUTPATIENT
Start: 2025-07-02 | End: 2025-07-02

## 2025-07-02 RX ADMIN — KETOROLAC TROMETHAMINE 30 MILLIGRAM(S): 30 INJECTION, SOLUTION INTRAMUSCULAR; INTRAVENOUS at 23:07

## 2025-07-02 RX ADMIN — CYCLOBENZAPRINE HYDROCHLORIDE 10 MILLIGRAM(S): 15 CAPSULE, EXTENDED RELEASE ORAL at 23:07

## 2025-07-03 RX ORDER — LIDOCAINE HYDROCHLORIDE 20 MG/ML
1 JELLY TOPICAL ONCE
Refills: 0 | Status: COMPLETED | OUTPATIENT
Start: 2025-07-03 | End: 2025-07-03

## 2025-07-03 RX ORDER — ACETAMINOPHEN 500 MG/5ML
650 LIQUID (ML) ORAL ONCE
Refills: 0 | Status: COMPLETED | OUTPATIENT
Start: 2025-07-03 | End: 2025-07-03

## 2025-07-03 RX ADMIN — LIDOCAINE HYDROCHLORIDE 1 PATCH: 20 JELLY TOPICAL at 00:14

## 2025-07-03 RX ADMIN — Medication 650 MILLIGRAM(S): at 00:14

## (undated) DEVICE — SUT MONOCRYL 4-0 27" PS-2 UNDYED

## (undated) DEVICE — BASIN SET SINGLE

## (undated) DEVICE — SYR LUER LOK 3CC

## (undated) DEVICE — SYR LUER LOK 20CC

## (undated) DEVICE — ELCTR ECG CONDUCTIVE ADHESIVE

## (undated) DEVICE — UNDERPAD LINEN SAVER 17 X 24"

## (undated) DEVICE — GOWN LG

## (undated) DEVICE — WARMING BLANKET UPPER ADULT

## (undated) DEVICE — ENDOCATCH 10MM SPECIMEN POUCH

## (undated) DEVICE — TUBING OLYMPUS INSUFFLATION

## (undated) DEVICE — TIP METZENBAUM SCISSOR MONOPOLAR ENDOCUT (ORANGE)

## (undated) DEVICE — TUBING HYDRO-SURG PLUS IRRIGATOR W SMOKEVAC & PROBE

## (undated) DEVICE — D HELP - CLEARVIEW CLEARIFY SYSTEM

## (undated) DEVICE — PACK IV START WITH CHG

## (undated) DEVICE — VENODYNE/SCD SLEEVE CALF MEDIUM

## (undated) DEVICE — PROTECTOR HEEL / ELBOW

## (undated) DEVICE — DRSG 2X2

## (undated) DEVICE — ELCTR BOVIE PENCIL SMOKE EVACUATION

## (undated) DEVICE — OLYMPUS DISTAL COVER ENDOSCOPE

## (undated) DEVICE — SOL INJ NS 0.9% 500ML 1-PORT

## (undated) DEVICE — DENTURE CUP PINK

## (undated) DEVICE — ELCTR BOVIE TIP BLADE INSULATED 2.75" EDGE

## (undated) DEVICE — DRAPE LAPAROSCOPIC CHOLECYSTECTOMY

## (undated) DEVICE — DRSG STERISTRIPS 0.5 X 4"

## (undated) DEVICE — OMNIPAQUE 300  30ML

## (undated) DEVICE — ELCTR GROUNDING PAD ADULT COVIDIEN

## (undated) DEVICE — DRAPE 3/4 SHEET 52X76"

## (undated) DEVICE — BLADE SURGICAL #15 CARBON

## (undated) DEVICE — SALIVA EJECTOR (BLUE)

## (undated) DEVICE — SENSOR O2 FINGER ADULT

## (undated) DEVICE — TUBING SUCTION NONCONDUCTIVE 6MM X 12FT

## (undated) DEVICE — DRSG CURITY GAUZE SPONGE 4 X 4" 12-PLY NON-STERILE

## (undated) DEVICE — PACK GENERAL LAPAROSCOPY

## (undated) DEVICE — DRSG BANDAID 0.75X3"

## (undated) DEVICE — TUBING INSUFFLATION LAP FILTER 10FT

## (undated) DEVICE — ORGANIZER MIO MEDICAL DEVICE DISP

## (undated) DEVICE — TROCAR COVIDIEN VERSAONE BLUNT TIP HASSAN 12MM

## (undated) DEVICE — SOL IRR POUR NS 0.9% 1000ML

## (undated) DEVICE — DRAPE TOWEL BLUE 17" X 24"

## (undated) DEVICE — GLV 7.5 PROTEXIS (WHITE)

## (undated) DEVICE — NDL HYPO SAFE 22G X 1" (BLACK)

## (undated) DEVICE — SUT VICRYL 0 27" UR-6

## (undated) DEVICE — SOL IRR POUR NS 0.9% 500ML

## (undated) DEVICE — DVC AUTO CAP RX LOKG

## (undated) DEVICE — POSITIONER STRAP ARMBOARD VELCRO TS-30

## (undated) DEVICE — BITE BLOCK ADULT 20 X 27MM (GREEN)

## (undated) DEVICE — TROCAR COVIDIEN BLUNT TIP HASSAN 10MM

## (undated) DEVICE — INJ SYS RAP REFIL

## (undated) DEVICE — SYR LUER LOK 10CC

## (undated) DEVICE — DISSECTOR ENDOSCOPIC KITTNER SINGLE TIP

## (undated) DEVICE — CANISTER DISPOSABLE THIN WALL 3000CC

## (undated) DEVICE — BASIN EMESIS 10IN GRADUATED MAUVE

## (undated) DEVICE — TROCAR COVIDIEN VERSAPORT BLADELESS OPTICAL 5MM STANDARD

## (undated) DEVICE — SOL IRR POUR H2O 500ML

## (undated) DEVICE — ESU ERBE 044850: Type: DURABLE MEDICAL EQUIPMENT

## (undated) DEVICE — LINE BREATHE SAMPLNG

## (undated) DEVICE — CATH IV SAFE BC 22G X 1" (BLUE)